# Patient Record
Sex: FEMALE | Race: WHITE | NOT HISPANIC OR LATINO
[De-identification: names, ages, dates, MRNs, and addresses within clinical notes are randomized per-mention and may not be internally consistent; named-entity substitution may affect disease eponyms.]

---

## 2017-01-15 ENCOUNTER — RX RENEWAL (OUTPATIENT)
Age: 82
End: 2017-01-15

## 2017-01-16 ENCOUNTER — RX RENEWAL (OUTPATIENT)
Age: 82
End: 2017-01-16

## 2017-01-16 ENCOUNTER — MEDICATION RENEWAL (OUTPATIENT)
Age: 82
End: 2017-01-16

## 2017-01-17 ENCOUNTER — NON-APPOINTMENT (OUTPATIENT)
Age: 82
End: 2017-01-17

## 2017-01-17 ENCOUNTER — APPOINTMENT (OUTPATIENT)
Dept: CARDIOLOGY | Facility: CLINIC | Age: 82
End: 2017-01-17

## 2017-01-17 VITALS
OXYGEN SATURATION: 95 % | HEIGHT: 60 IN | BODY MASS INDEX: 18.85 KG/M2 | WEIGHT: 96 LBS | DIASTOLIC BLOOD PRESSURE: 85 MMHG | SYSTOLIC BLOOD PRESSURE: 127 MMHG | HEART RATE: 84 BPM

## 2017-02-22 ENCOUNTER — APPOINTMENT (OUTPATIENT)
Dept: INTERNAL MEDICINE | Facility: CLINIC | Age: 82
End: 2017-02-22

## 2017-02-22 VITALS
SYSTOLIC BLOOD PRESSURE: 118 MMHG | TEMPERATURE: 98.1 F | HEIGHT: 60 IN | WEIGHT: 94 LBS | HEART RATE: 83 BPM | OXYGEN SATURATION: 94 % | BODY MASS INDEX: 18.46 KG/M2 | RESPIRATION RATE: 14 BRPM | DIASTOLIC BLOOD PRESSURE: 68 MMHG

## 2017-02-22 VITALS — TEMPERATURE: 97.7 F

## 2017-02-25 ENCOUNTER — EMERGENCY (EMERGENCY)
Facility: HOSPITAL | Age: 82
LOS: 1 days | Discharge: HOME | End: 2017-02-25
Attending: EMERGENCY MEDICINE | Admitting: EMERGENCY MEDICINE
Payer: COMMERCIAL

## 2017-02-25 VITALS
WEIGHT: 149.91 LBS | SYSTOLIC BLOOD PRESSURE: 137 MMHG | HEIGHT: 68 IN | DIASTOLIC BLOOD PRESSURE: 86 MMHG | TEMPERATURE: 98 F | RESPIRATION RATE: 16 BRPM | OXYGEN SATURATION: 97 % | HEART RATE: 63 BPM

## 2017-02-25 VITALS
RESPIRATION RATE: 16 BRPM | DIASTOLIC BLOOD PRESSURE: 71 MMHG | HEART RATE: 89 BPM | SYSTOLIC BLOOD PRESSURE: 125 MMHG | OXYGEN SATURATION: 98 %

## 2017-02-25 DIAGNOSIS — Z90.49 ACQUIRED ABSENCE OF OTHER SPECIFIED PARTS OF DIGESTIVE TRACT: Chronic | ICD-10-CM

## 2017-02-25 DIAGNOSIS — J06.9 ACUTE UPPER RESPIRATORY INFECTION, UNSPECIFIED: ICD-10-CM

## 2017-02-25 DIAGNOSIS — Z96.611 PRESENCE OF RIGHT ARTIFICIAL SHOULDER JOINT: ICD-10-CM

## 2017-02-25 DIAGNOSIS — Z79.01 LONG TERM (CURRENT) USE OF ANTICOAGULANTS: ICD-10-CM

## 2017-02-25 DIAGNOSIS — I34.0 NONRHEUMATIC MITRAL (VALVE) INSUFFICIENCY: ICD-10-CM

## 2017-02-25 DIAGNOSIS — Z98.89 OTHER SPECIFIED POSTPROCEDURAL STATES: Chronic | ICD-10-CM

## 2017-02-25 DIAGNOSIS — R06.02 SHORTNESS OF BREATH: ICD-10-CM

## 2017-02-25 DIAGNOSIS — Z87.891 PERSONAL HISTORY OF NICOTINE DEPENDENCE: ICD-10-CM

## 2017-02-25 DIAGNOSIS — Z96.611 PRESENCE OF RIGHT ARTIFICIAL SHOULDER JOINT: Chronic | ICD-10-CM

## 2017-02-25 DIAGNOSIS — I48.91 UNSPECIFIED ATRIAL FIBRILLATION: ICD-10-CM

## 2017-02-25 DIAGNOSIS — I25.10 ATHEROSCLEROTIC HEART DISEASE OF NATIVE CORONARY ARTERY WITHOUT ANGINA PECTORIS: ICD-10-CM

## 2017-02-25 DIAGNOSIS — J44.9 CHRONIC OBSTRUCTIVE PULMONARY DISEASE, UNSPECIFIED: ICD-10-CM

## 2017-02-25 LAB
ALBUMIN SERPL ELPH-MCNC: 3.8 G/DL — SIGNIFICANT CHANGE UP (ref 3.3–5)
ALP SERPL-CCNC: 66 U/L — SIGNIFICANT CHANGE UP (ref 40–120)
ALT FLD-CCNC: 28 U/L — SIGNIFICANT CHANGE UP (ref 12–78)
AMYLASE P1 CFR SERPL: 63 U/L — SIGNIFICANT CHANGE UP (ref 25–115)
ANION GAP SERPL CALC-SCNC: 8 MMOL/L — SIGNIFICANT CHANGE UP (ref 5–17)
AST SERPL-CCNC: 24 U/L — SIGNIFICANT CHANGE UP (ref 15–37)
BASOPHILS # BLD AUTO: 0.1 K/UL — SIGNIFICANT CHANGE UP (ref 0–0.2)
BASOPHILS NFR BLD AUTO: 1.6 % — SIGNIFICANT CHANGE UP (ref 0–2)
BILIRUB SERPL-MCNC: 0.6 MG/DL — SIGNIFICANT CHANGE UP (ref 0.2–1.2)
BUN SERPL-MCNC: 22 MG/DL — SIGNIFICANT CHANGE UP (ref 7–23)
CALCIUM SERPL-MCNC: 8.8 MG/DL — SIGNIFICANT CHANGE UP (ref 8.5–10.1)
CHLORIDE SERPL-SCNC: 98 MMOL/L — SIGNIFICANT CHANGE UP (ref 96–108)
CK MB CFR SERPL CALC: 0.7 NG/ML — SIGNIFICANT CHANGE UP (ref 0–3.6)
CO2 SERPL-SCNC: 32 MMOL/L — HIGH (ref 22–31)
CREAT SERPL-MCNC: 1.1 MG/DL — SIGNIFICANT CHANGE UP (ref 0.5–1.3)
EOSINOPHIL # BLD AUTO: 0.2 K/UL — SIGNIFICANT CHANGE UP (ref 0–0.5)
EOSINOPHIL NFR BLD AUTO: 2.8 % — SIGNIFICANT CHANGE UP (ref 0–6)
GLUCOSE SERPL-MCNC: 101 MG/DL — HIGH (ref 70–99)
HCT VFR BLD CALC: 37.8 % — SIGNIFICANT CHANGE UP (ref 34.5–45)
HGB BLD-MCNC: 12.7 G/DL — SIGNIFICANT CHANGE UP (ref 11.5–15.5)
LACTATE SERPL-SCNC: 0.9 MMOL/L — SIGNIFICANT CHANGE UP (ref 0.7–2)
LIDOCAIN IGE QN: 146 U/L — SIGNIFICANT CHANGE UP (ref 73–393)
LYMPHOCYTES # BLD AUTO: 1.6 K/UL — SIGNIFICANT CHANGE UP (ref 1–3.3)
LYMPHOCYTES # BLD AUTO: 18.8 % — SIGNIFICANT CHANGE UP (ref 13–44)
MCHC RBC-ENTMCNC: 32 PG — SIGNIFICANT CHANGE UP (ref 27–34)
MCHC RBC-ENTMCNC: 33.7 GM/DL — SIGNIFICANT CHANGE UP (ref 32–36)
MCV RBC AUTO: 95.2 FL — SIGNIFICANT CHANGE UP (ref 80–100)
MONOCYTES # BLD AUTO: 0.7 K/UL — SIGNIFICANT CHANGE UP (ref 0–0.9)
MONOCYTES NFR BLD AUTO: 8.5 % — SIGNIFICANT CHANGE UP (ref 1–9)
NEUTROPHILS # BLD AUTO: 5.8 K/UL — SIGNIFICANT CHANGE UP (ref 1.8–7.4)
NEUTROPHILS NFR BLD AUTO: 68.4 % — SIGNIFICANT CHANGE UP (ref 43–77)
PLATELET # BLD AUTO: 279 K/UL — SIGNIFICANT CHANGE UP (ref 150–400)
POTASSIUM SERPL-MCNC: 4.2 MMOL/L — SIGNIFICANT CHANGE UP (ref 3.5–5.3)
POTASSIUM SERPL-SCNC: 4.2 MMOL/L — SIGNIFICANT CHANGE UP (ref 3.5–5.3)
PROCALCITONIN SERPL-MCNC: <0.05 NG/ML — SIGNIFICANT CHANGE UP (ref 0–0.05)
PROT SERPL-MCNC: 7.2 G/DL — SIGNIFICANT CHANGE UP (ref 6–8.3)
RAPID RVP RESULT: SIGNIFICANT CHANGE UP
RBC # BLD: 3.97 M/UL — SIGNIFICANT CHANGE UP (ref 3.8–5.2)
RBC # FLD: 12.4 % — SIGNIFICANT CHANGE UP (ref 10.3–14.5)
SODIUM SERPL-SCNC: 138 MMOL/L — SIGNIFICANT CHANGE UP (ref 135–145)
TROPONIN I SERPL-MCNC: <.015 NG/ML — SIGNIFICANT CHANGE UP (ref 0.01–0.04)
WBC # BLD: 8.4 K/UL — SIGNIFICANT CHANGE UP (ref 3.8–10.5)
WBC # FLD AUTO: 8.4 K/UL — SIGNIFICANT CHANGE UP (ref 3.8–10.5)

## 2017-02-25 PROCEDURE — 80053 COMPREHEN METABOLIC PANEL: CPT

## 2017-02-25 PROCEDURE — 82150 ASSAY OF AMYLASE: CPT

## 2017-02-25 PROCEDURE — 83605 ASSAY OF LACTIC ACID: CPT

## 2017-02-25 PROCEDURE — 87798 DETECT AGENT NOS DNA AMP: CPT

## 2017-02-25 PROCEDURE — 82553 CREATINE MB FRACTION: CPT

## 2017-02-25 PROCEDURE — 71045 X-RAY EXAM CHEST 1 VIEW: CPT

## 2017-02-25 PROCEDURE — 99285 EMERGENCY DEPT VISIT HI MDM: CPT

## 2017-02-25 PROCEDURE — 87633 RESP VIRUS 12-25 TARGETS: CPT

## 2017-02-25 PROCEDURE — 99284 EMERGENCY DEPT VISIT MOD MDM: CPT

## 2017-02-25 PROCEDURE — 87581 M.PNEUMON DNA AMP PROBE: CPT

## 2017-02-25 PROCEDURE — 99284 EMERGENCY DEPT VISIT MOD MDM: CPT | Mod: 25

## 2017-02-25 PROCEDURE — 83690 ASSAY OF LIPASE: CPT

## 2017-02-25 PROCEDURE — 93005 ELECTROCARDIOGRAM TRACING: CPT

## 2017-02-25 PROCEDURE — 84145 PROCALCITONIN (PCT): CPT

## 2017-02-25 PROCEDURE — 87486 CHLMYD PNEUM DNA AMP PROBE: CPT

## 2017-02-25 PROCEDURE — 84484 ASSAY OF TROPONIN QUANT: CPT

## 2017-02-25 PROCEDURE — 71010: CPT | Mod: 26

## 2017-02-25 PROCEDURE — 85027 COMPLETE CBC AUTOMATED: CPT

## 2017-02-25 RX ORDER — SODIUM CHLORIDE 9 MG/ML
3 INJECTION INTRAMUSCULAR; INTRAVENOUS; SUBCUTANEOUS ONCE
Qty: 0 | Refills: 0 | Status: COMPLETED | OUTPATIENT
Start: 2017-02-25 | End: 2017-02-25

## 2017-02-25 RX ADMIN — SODIUM CHLORIDE 3 MILLILITER(S): 9 INJECTION INTRAMUSCULAR; INTRAVENOUS; SUBCUTANEOUS at 12:05

## 2017-02-25 NOTE — ED PROVIDER NOTE - CARE PLAN
Principal Discharge DX:	Viral upper respiratory tract infection  Instructions for follow-up, activity and diet:	Return to the ED for any new or worsening symptoms  Take your medication as prescribed  Follow up with your PMD in 2-3 days for a recheck   Follow up with Dr. Israel within the week  Use your inhaler only if needed as discussed while in the ED  Finish the Zithromax previously prescribed  Secondary Diagnosis:	Atrial fibrillation, unspecified type  Secondary Diagnosis:	Shortness of breath

## 2017-02-25 NOTE — ED ADULT NURSE NOTE - PMH
Atrial fibrillation, unspecified type    Carbon monoxide poisoning  2010  Compression fracture  Aug 2015  Pulmonary emphysema, unspecified emphysema type    Secondary hypertension

## 2017-02-25 NOTE — ED PROVIDER NOTE - OBJECTIVE STATEMENT
Pt is a 80 yo female who presents to the D with a cc of progressive SOB x 1 week and elevated blood pressures today.  Pt has a history of A-fib and is on Eliquis for this.  Pt reports that she has been experiencing increased SOB this week with a non-productive cough.  She saw her PMD for this and was prescribed Zithromax.  Pt reports little to no improvement in symptoms.  She does have a history of COPD but has not taken her nebulizer and refuses steroids due to a cardiac condition she has.  Pt was told to avoid these medications. Pt reports mild chills and nausea.  Denies fever, V/D/C, CP, abd pain. Today she felt flushed and when she took her BP at home it was higher then normal.  Pt reports systolic pressures of 150.  Denies HA or visual changes.  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

## 2017-02-25 NOTE — ED PROVIDER NOTE - PROGRESS NOTE DETAILS
Pt seen by cardiology Dr. Kenyon cleared from his standpoint for discharge home.  Results of labs and images reviewed with pt, still refusing steroids and albuterol at this time.  Maintaining sats on room air and able to ambulate in the department without distress.  Will discharge home with close outpatient follow up

## 2017-02-25 NOTE — ED PROVIDER NOTE - ENMT, MLM
Airway patent, Nasal mucosa clear. Mouth with normal mucosa. Throat has no vesicles, no oropharyngeal exudates and uvula is midline.  Pt with hoarse voice baseline with history of spastic dysphonia

## 2017-02-25 NOTE — ED ADULT NURSE NOTE - OBJECTIVE STATEMENT
received pt in bed #14B Pt A&Ox3 states she had cough on/off x 1 week & SOB x 4days went to her MD on Wednesday & was placed on Zithromax. Pt states her BP has been high & she still feels slightly SOB CM placed w/ afib EKG done O2 sat 97% on RA Will monitor

## 2017-02-25 NOTE — ED PROVIDER NOTE - PLAN OF CARE
Return to the ED for any new or worsening symptoms  Take your medication as prescribed  Follow up with your PMD in 2-3 days for a recheck   Follow up with Dr. Israel within the week  Use your inhaler only if needed as discussed while in the ED  Finish the Zithromax previously prescribed

## 2017-02-25 NOTE — ED ADULT NURSE NOTE - PSH
History of appendectomy  1962  History of arthroscopy of knee    History of right shoulder replacement  2005

## 2017-03-01 ENCOUNTER — APPOINTMENT (OUTPATIENT)
Dept: INTERNAL MEDICINE | Facility: CLINIC | Age: 82
End: 2017-03-01

## 2017-03-01 VITALS
SYSTOLIC BLOOD PRESSURE: 110 MMHG | HEART RATE: 80 BPM | TEMPERATURE: 97.6 F | HEIGHT: 60 IN | WEIGHT: 94 LBS | OXYGEN SATURATION: 95 % | DIASTOLIC BLOOD PRESSURE: 90 MMHG | BODY MASS INDEX: 18.46 KG/M2 | RESPIRATION RATE: 14 BRPM

## 2017-03-01 DIAGNOSIS — Z87.09 PERSONAL HISTORY OF OTHER DISEASES OF THE RESPIRATORY SYSTEM: ICD-10-CM

## 2017-03-20 ENCOUNTER — RX RENEWAL (OUTPATIENT)
Age: 82
End: 2017-03-20

## 2017-03-20 ENCOUNTER — MEDICATION RENEWAL (OUTPATIENT)
Age: 82
End: 2017-03-20

## 2017-03-23 ENCOUNTER — NON-APPOINTMENT (OUTPATIENT)
Age: 82
End: 2017-03-23

## 2017-03-23 ENCOUNTER — APPOINTMENT (OUTPATIENT)
Dept: CARDIOLOGY | Facility: CLINIC | Age: 82
End: 2017-03-23

## 2017-03-23 VITALS
DIASTOLIC BLOOD PRESSURE: 83 MMHG | HEART RATE: 83 BPM | SYSTOLIC BLOOD PRESSURE: 127 MMHG | BODY MASS INDEX: 19.31 KG/M2 | WEIGHT: 92 LBS | HEIGHT: 58 IN

## 2017-04-03 ENCOUNTER — APPOINTMENT (OUTPATIENT)
Dept: CARDIOLOGY | Facility: CLINIC | Age: 82
End: 2017-04-03

## 2017-04-03 ENCOUNTER — NON-APPOINTMENT (OUTPATIENT)
Age: 82
End: 2017-04-03

## 2017-04-03 VITALS
SYSTOLIC BLOOD PRESSURE: 125 MMHG | HEIGHT: 57 IN | HEART RATE: 81 BPM | DIASTOLIC BLOOD PRESSURE: 77 MMHG | BODY MASS INDEX: 20.06 KG/M2 | WEIGHT: 93 LBS

## 2017-04-14 ENCOUNTER — APPOINTMENT (OUTPATIENT)
Dept: INTERNAL MEDICINE | Facility: CLINIC | Age: 82
End: 2017-04-14

## 2017-04-26 ENCOUNTER — MEDICATION RENEWAL (OUTPATIENT)
Age: 82
End: 2017-04-26

## 2017-05-15 ENCOUNTER — MEDICATION RENEWAL (OUTPATIENT)
Age: 82
End: 2017-05-15

## 2017-06-08 ENCOUNTER — MEDICATION RENEWAL (OUTPATIENT)
Age: 82
End: 2017-06-08

## 2017-06-08 ENCOUNTER — NON-APPOINTMENT (OUTPATIENT)
Age: 82
End: 2017-06-08

## 2017-06-08 ENCOUNTER — APPOINTMENT (OUTPATIENT)
Dept: CARDIOLOGY | Facility: CLINIC | Age: 82
End: 2017-06-08

## 2017-06-08 VITALS
BODY MASS INDEX: 20.93 KG/M2 | HEART RATE: 80 BPM | HEIGHT: 57 IN | SYSTOLIC BLOOD PRESSURE: 126 MMHG | DIASTOLIC BLOOD PRESSURE: 72 MMHG | OXYGEN SATURATION: 98 % | WEIGHT: 97 LBS

## 2017-06-08 RX ORDER — PRAVASTATIN SODIUM 10 MG/1
10 TABLET ORAL
Qty: 30 | Refills: 5 | Status: DISCONTINUED | COMMUNITY
Start: 2017-04-03 | End: 2017-06-08

## 2017-07-13 ENCOUNTER — APPOINTMENT (OUTPATIENT)
Dept: INTERNAL MEDICINE | Facility: CLINIC | Age: 82
End: 2017-07-13

## 2017-07-13 RX ORDER — IPRATROPIUM BROMIDE 17 UG/1
17 AEROSOL, METERED RESPIRATORY (INHALATION) 4 TIMES DAILY
Qty: 1 | Refills: 5 | Status: ACTIVE | COMMUNITY
Start: 2017-07-13 | End: 1900-01-01

## 2017-08-07 ENCOUNTER — RX RENEWAL (OUTPATIENT)
Age: 82
End: 2017-08-07

## 2017-08-10 ENCOUNTER — APPOINTMENT (OUTPATIENT)
Dept: INTERNAL MEDICINE | Facility: CLINIC | Age: 82
End: 2017-08-10
Payer: MEDICARE

## 2017-08-10 VITALS
HEART RATE: 111 BPM | OXYGEN SATURATION: 97 % | DIASTOLIC BLOOD PRESSURE: 92 MMHG | BODY MASS INDEX: 20.28 KG/M2 | HEIGHT: 57 IN | SYSTOLIC BLOOD PRESSURE: 131 MMHG | WEIGHT: 94 LBS

## 2017-08-10 PROCEDURE — 99214 OFFICE O/P EST MOD 30 MIN: CPT

## 2017-08-11 RX ORDER — SIMVASTATIN 10 MG/1
10 TABLET, FILM COATED ORAL
Qty: 30 | Refills: 5 | Status: DISCONTINUED | COMMUNITY
Start: 2017-06-08 | End: 2017-08-11

## 2017-08-17 ENCOUNTER — NON-APPOINTMENT (OUTPATIENT)
Age: 82
End: 2017-08-17

## 2017-08-17 ENCOUNTER — APPOINTMENT (OUTPATIENT)
Dept: CARDIOLOGY | Facility: CLINIC | Age: 82
End: 2017-08-17
Payer: MEDICARE

## 2017-08-17 VITALS
WEIGHT: 95 LBS | BODY MASS INDEX: 20.49 KG/M2 | OXYGEN SATURATION: 100 % | DIASTOLIC BLOOD PRESSURE: 78 MMHG | HEART RATE: 76 BPM | HEIGHT: 57 IN | SYSTOLIC BLOOD PRESSURE: 136 MMHG

## 2017-08-17 PROCEDURE — 93000 ELECTROCARDIOGRAM COMPLETE: CPT

## 2017-08-17 PROCEDURE — 99215 OFFICE O/P EST HI 40 MIN: CPT

## 2017-08-17 RX ORDER — DILTIAZEM HYDROCHLORIDE 60 MG/1
60 TABLET ORAL
Qty: 60 | Refills: 3 | Status: DISCONTINUED | COMMUNITY
Start: 2017-08-11 | End: 2017-08-17

## 2017-10-16 ENCOUNTER — NON-APPOINTMENT (OUTPATIENT)
Age: 82
End: 2017-10-16

## 2017-10-16 ENCOUNTER — MEDICATION RENEWAL (OUTPATIENT)
Age: 82
End: 2017-10-16

## 2017-10-16 ENCOUNTER — APPOINTMENT (OUTPATIENT)
Dept: CARDIOLOGY | Facility: CLINIC | Age: 82
End: 2017-10-16
Payer: MEDICARE

## 2017-10-16 VITALS
BODY MASS INDEX: 20.49 KG/M2 | WEIGHT: 95 LBS | SYSTOLIC BLOOD PRESSURE: 113 MMHG | DIASTOLIC BLOOD PRESSURE: 75 MMHG | HEIGHT: 57 IN | HEART RATE: 59 BPM | OXYGEN SATURATION: 98 %

## 2017-10-16 PROCEDURE — 93000 ELECTROCARDIOGRAM COMPLETE: CPT

## 2017-10-16 PROCEDURE — 99215 OFFICE O/P EST HI 40 MIN: CPT

## 2017-10-16 RX ORDER — ALBUTEROL SULFATE 90 UG/1
108 (90 BASE) AEROSOL, METERED RESPIRATORY (INHALATION)
Qty: 1 | Refills: 3 | Status: ACTIVE | COMMUNITY
Start: 2017-10-16 | End: 1900-01-01

## 2017-10-16 RX ORDER — PREDNISONE 10 MG/1
10 TABLET ORAL
Qty: 19 | Refills: 0 | Status: DISCONTINUED | COMMUNITY
Start: 2017-03-01 | End: 2017-10-16

## 2017-10-20 ENCOUNTER — APPOINTMENT (OUTPATIENT)
Dept: INTERNAL MEDICINE | Facility: CLINIC | Age: 82
End: 2017-10-20
Payer: MEDICARE

## 2017-10-20 VITALS
RESPIRATION RATE: 14 BRPM | DIASTOLIC BLOOD PRESSURE: 78 MMHG | HEART RATE: 61 BPM | HEIGHT: 57 IN | WEIGHT: 96 LBS | SYSTOLIC BLOOD PRESSURE: 120 MMHG | BODY MASS INDEX: 20.71 KG/M2 | OXYGEN SATURATION: 97 %

## 2017-10-20 DIAGNOSIS — Z23 ENCOUNTER FOR IMMUNIZATION: ICD-10-CM

## 2017-10-20 DIAGNOSIS — Z00.00 ENCOUNTER FOR GENERAL ADULT MEDICAL EXAMINATION W/OUT ABNORMAL FINDINGS: ICD-10-CM

## 2017-10-20 PROCEDURE — 90732 PPSV23 VACC 2 YRS+ SUBQ/IM: CPT

## 2017-10-20 PROCEDURE — G0009: CPT

## 2017-10-20 PROCEDURE — G0008: CPT

## 2017-10-20 PROCEDURE — 90686 IIV4 VACC NO PRSV 0.5 ML IM: CPT

## 2017-10-20 PROCEDURE — 99397 PER PM REEVAL EST PAT 65+ YR: CPT | Mod: 25,GY

## 2017-10-24 ENCOUNTER — CLINICAL ADVICE (OUTPATIENT)
Age: 82
End: 2017-10-24

## 2017-10-25 DIAGNOSIS — E78.5 HYPERLIPIDEMIA, UNSPECIFIED: ICD-10-CM

## 2017-10-25 LAB
25(OH)D3 SERPL-MCNC: 25.7 NG/ML
ALBUMIN SERPL ELPH-MCNC: 4.1 G/DL
ALP BLD-CCNC: 63 U/L
ALT SERPL-CCNC: 13 U/L
ANION GAP SERPL CALC-SCNC: 14 MMOL/L
APPEARANCE: CLEAR
AST SERPL-CCNC: 18 U/L
BACTERIA: NEGATIVE
BASOPHILS # BLD AUTO: 0.05 K/UL
BASOPHILS NFR BLD AUTO: 0.6 %
BILIRUB SERPL-MCNC: 0.3 MG/DL
BILIRUBIN URINE: NEGATIVE
BLOOD URINE: NEGATIVE
BUN SERPL-MCNC: 32 MG/DL
CALCIUM SERPL-MCNC: 9.6 MG/DL
CHLORIDE SERPL-SCNC: 102 MMOL/L
CHOLEST SERPL-MCNC: 180 MG/DL
CHOLEST/HDLC SERPL: 3.1 RATIO
CO2 SERPL-SCNC: 26 MMOL/L
COLOR: YELLOW
CREAT SERPL-MCNC: 1.42 MG/DL
EOSINOPHIL # BLD AUTO: 0.18 K/UL
EOSINOPHIL NFR BLD AUTO: 2 %
FOLATE SERPL-MCNC: 15.1 NG/ML
GLUCOSE QUALITATIVE U: NEGATIVE MG/DL
GLUCOSE SERPL-MCNC: 91 MG/DL
HBA1C MFR BLD HPLC: 6 %
HCT VFR BLD CALC: 36.4 %
HDLC SERPL-MCNC: 59 MG/DL
HGB BLD-MCNC: 11.5 G/DL
HYALINE CASTS: 1 /LPF
IMM GRANULOCYTES NFR BLD AUTO: 0.2 %
KETONES URINE: NEGATIVE
LDLC SERPL CALC-MCNC: 77 MG/DL
LEUKOCYTE ESTERASE URINE: NEGATIVE
LYMPHOCYTES # BLD AUTO: 2.33 K/UL
LYMPHOCYTES NFR BLD AUTO: 25.6 %
MAN DIFF?: NORMAL
MCHC RBC-ENTMCNC: 31.6 GM/DL
MCHC RBC-ENTMCNC: 32.3 PG
MCV RBC AUTO: 102.2 FL
MICROSCOPIC-UA: NORMAL
MONOCYTES # BLD AUTO: 0.78 K/UL
MONOCYTES NFR BLD AUTO: 8.6 %
NEUTROPHILS # BLD AUTO: 5.73 K/UL
NEUTROPHILS NFR BLD AUTO: 63 %
NITRITE URINE: NEGATIVE
PH URINE: 6.5
PLATELET # BLD AUTO: 370 K/UL
POTASSIUM SERPL-SCNC: 4.5 MMOL/L
PROT SERPL-MCNC: 7.4 G/DL
PROTEIN URINE: NEGATIVE MG/DL
RBC # BLD: 3.56 M/UL
RBC # FLD: 13.4 %
RED BLOOD CELLS URINE: 2 /HPF
SODIUM SERPL-SCNC: 142 MMOL/L
SPECIFIC GRAVITY URINE: 1.02
SQUAMOUS EPITHELIAL CELLS: 2 /HPF
TRIGL SERPL-MCNC: 218 MG/DL
TSH SERPL-ACNC: 0.99 UIU/ML
UROBILINOGEN URINE: NEGATIVE MG/DL
VIT B12 SERPL-MCNC: 753 PG/ML
WBC # FLD AUTO: 9.09 K/UL
WHITE BLOOD CELLS URINE: 1 /HPF

## 2017-10-25 RX ORDER — AZITHROMYCIN 250 MG/1
250 TABLET, FILM COATED ORAL
Qty: 1 | Refills: 0 | Status: DISCONTINUED | COMMUNITY
Start: 2017-02-22 | End: 2017-10-25

## 2017-11-01 ENCOUNTER — APPOINTMENT (OUTPATIENT)
Dept: DERMATOLOGY | Facility: CLINIC | Age: 82
End: 2017-11-01
Payer: MEDICARE

## 2017-11-01 PROCEDURE — 99202 OFFICE O/P NEW SF 15 MIN: CPT | Mod: 25

## 2017-11-01 PROCEDURE — 17000 DESTRUCT PREMALG LESION: CPT

## 2017-11-27 ENCOUNTER — APPOINTMENT (OUTPATIENT)
Dept: CARDIOLOGY | Facility: CLINIC | Age: 82
End: 2017-11-27
Payer: MEDICARE

## 2017-11-27 ENCOUNTER — APPOINTMENT (OUTPATIENT)
Dept: DERMATOLOGY | Facility: CLINIC | Age: 82
End: 2017-11-27

## 2017-11-27 ENCOUNTER — NON-APPOINTMENT (OUTPATIENT)
Age: 82
End: 2017-11-27

## 2017-11-27 VITALS
WEIGHT: 96 LBS | HEART RATE: 71 BPM | HEIGHT: 57 IN | SYSTOLIC BLOOD PRESSURE: 108 MMHG | OXYGEN SATURATION: 98 % | DIASTOLIC BLOOD PRESSURE: 71 MMHG | BODY MASS INDEX: 20.71 KG/M2

## 2017-11-27 DIAGNOSIS — I25.10 ATHEROSCLEROTIC HEART DISEASE OF NATIVE CORONARY ARTERY W/OUT ANGINA PECTORIS: ICD-10-CM

## 2017-11-27 PROCEDURE — 93000 ELECTROCARDIOGRAM COMPLETE: CPT

## 2017-11-27 PROCEDURE — 99215 OFFICE O/P EST HI 40 MIN: CPT

## 2017-12-05 RX ORDER — ALIROCUMAB 75 MG/ML
75 INJECTION, SOLUTION SUBCUTANEOUS
Qty: 6 | Refills: 3 | Status: ACTIVE | COMMUNITY
Start: 2017-08-17 | End: 1900-01-01

## 2017-12-14 ENCOUNTER — APPOINTMENT (OUTPATIENT)
Dept: INTERNAL MEDICINE | Facility: CLINIC | Age: 82
End: 2017-12-14

## 2017-12-21 ENCOUNTER — APPOINTMENT (OUTPATIENT)
Dept: CARDIOLOGY | Facility: CLINIC | Age: 82
End: 2017-12-21
Payer: MEDICARE

## 2017-12-21 ENCOUNTER — NON-APPOINTMENT (OUTPATIENT)
Age: 82
End: 2017-12-21

## 2017-12-21 VITALS
SYSTOLIC BLOOD PRESSURE: 124 MMHG | WEIGHT: 96 LBS | HEART RATE: 82 BPM | OXYGEN SATURATION: 96 % | DIASTOLIC BLOOD PRESSURE: 79 MMHG | BODY MASS INDEX: 20.71 KG/M2 | HEIGHT: 57 IN

## 2017-12-21 PROCEDURE — 99215 OFFICE O/P EST HI 40 MIN: CPT

## 2017-12-21 PROCEDURE — 93000 ELECTROCARDIOGRAM COMPLETE: CPT

## 2017-12-21 RX ORDER — FLUTICASONE PROPIONATE AND SALMETEROL 50; 250 UG/1; UG/1
250-50 POWDER RESPIRATORY (INHALATION) TWICE DAILY
Qty: 1 | Refills: 2 | Status: ACTIVE | COMMUNITY
Start: 2017-12-21 | End: 1900-01-01

## 2018-01-08 ENCOUNTER — RX RENEWAL (OUTPATIENT)
Age: 83
End: 2018-01-08

## 2018-01-08 ENCOUNTER — MEDICATION RENEWAL (OUTPATIENT)
Age: 83
End: 2018-01-08

## 2018-01-08 RX ORDER — ALBUTEROL SULFATE 90 UG/1
108 (90 BASE) AEROSOL, METERED RESPIRATORY (INHALATION)
Qty: 1 | Refills: 5 | Status: ACTIVE | COMMUNITY
Start: 2017-08-22 | End: 1900-01-01

## 2018-01-09 ENCOUNTER — APPOINTMENT (OUTPATIENT)
Dept: INTERNAL MEDICINE | Facility: CLINIC | Age: 83
End: 2018-01-09
Payer: MEDICARE

## 2018-01-09 VITALS
RESPIRATION RATE: 14 BRPM | HEIGHT: 57 IN | SYSTOLIC BLOOD PRESSURE: 120 MMHG | DIASTOLIC BLOOD PRESSURE: 72 MMHG | BODY MASS INDEX: 21.57 KG/M2 | WEIGHT: 100 LBS | TEMPERATURE: 98.1 F | HEART RATE: 110 BPM | OXYGEN SATURATION: 97 %

## 2018-01-09 PROCEDURE — 99214 OFFICE O/P EST MOD 30 MIN: CPT

## 2018-01-09 RX ORDER — NAFTIFINE HYDROCHLORIDE 20 MG/G
2 CREAM TOPICAL
Qty: 60 | Refills: 0 | Status: COMPLETED | COMMUNITY
Start: 2017-11-01

## 2018-01-17 ENCOUNTER — EMERGENCY (EMERGENCY)
Facility: HOSPITAL | Age: 83
LOS: 1 days | Discharge: HOME | End: 2018-01-17
Attending: EMERGENCY MEDICINE | Admitting: EMERGENCY MEDICINE
Payer: MEDICARE

## 2018-01-17 VITALS
TEMPERATURE: 98 F | SYSTOLIC BLOOD PRESSURE: 155 MMHG | HEART RATE: 74 BPM | RESPIRATION RATE: 18 BRPM | HEIGHT: 60 IN | OXYGEN SATURATION: 93 % | DIASTOLIC BLOOD PRESSURE: 87 MMHG | WEIGHT: 95.02 LBS

## 2018-01-17 VITALS
RESPIRATION RATE: 16 BRPM | OXYGEN SATURATION: 98 % | HEART RATE: 98 BPM | TEMPERATURE: 98 F | SYSTOLIC BLOOD PRESSURE: 146 MMHG | DIASTOLIC BLOOD PRESSURE: 86 MMHG

## 2018-01-17 DIAGNOSIS — Z96.611 PRESENCE OF RIGHT ARTIFICIAL SHOULDER JOINT: Chronic | ICD-10-CM

## 2018-01-17 DIAGNOSIS — Z90.49 ACQUIRED ABSENCE OF OTHER SPECIFIED PARTS OF DIGESTIVE TRACT: Chronic | ICD-10-CM

## 2018-01-17 DIAGNOSIS — Z98.89 OTHER SPECIFIED POSTPROCEDURAL STATES: Chronic | ICD-10-CM

## 2018-01-17 LAB
ALBUMIN SERPL ELPH-MCNC: 3.8 G/DL — SIGNIFICANT CHANGE UP (ref 3.3–5)
ALP SERPL-CCNC: 86 U/L — SIGNIFICANT CHANGE UP (ref 40–120)
ALT FLD-CCNC: 18 U/L — SIGNIFICANT CHANGE UP (ref 12–78)
ANION GAP SERPL CALC-SCNC: 7 MMOL/L — SIGNIFICANT CHANGE UP (ref 5–17)
APTT BLD: 35.5 SEC — SIGNIFICANT CHANGE UP (ref 27.5–37.4)
AST SERPL-CCNC: 25 U/L — SIGNIFICANT CHANGE UP (ref 15–37)
BASOPHILS # BLD AUTO: 0.1 K/UL — SIGNIFICANT CHANGE UP (ref 0–0.2)
BASOPHILS NFR BLD AUTO: 1.6 % — SIGNIFICANT CHANGE UP (ref 0–2)
BILIRUB SERPL-MCNC: 0.4 MG/DL — SIGNIFICANT CHANGE UP (ref 0.2–1.2)
BUN SERPL-MCNC: 39 MG/DL — HIGH (ref 7–23)
CALCIUM SERPL-MCNC: 8.6 MG/DL — SIGNIFICANT CHANGE UP (ref 8.5–10.1)
CHLORIDE SERPL-SCNC: 106 MMOL/L — SIGNIFICANT CHANGE UP (ref 96–108)
CK MB BLD-MCNC: 2.2 % — SIGNIFICANT CHANGE UP (ref 0–3.5)
CK MB CFR SERPL CALC: 1 NG/ML — SIGNIFICANT CHANGE UP (ref 0–3.6)
CK SERPL-CCNC: 46 U/L — SIGNIFICANT CHANGE UP (ref 26–192)
CO2 SERPL-SCNC: 29 MMOL/L — SIGNIFICANT CHANGE UP (ref 22–31)
CREAT SERPL-MCNC: 1.5 MG/DL — HIGH (ref 0.5–1.3)
EOSINOPHIL # BLD AUTO: 0.1 K/UL — SIGNIFICANT CHANGE UP (ref 0–0.5)
EOSINOPHIL NFR BLD AUTO: 1.5 % — SIGNIFICANT CHANGE UP (ref 0–6)
GLUCOSE SERPL-MCNC: 103 MG/DL — HIGH (ref 70–99)
HCT VFR BLD CALC: 36.8 % — SIGNIFICANT CHANGE UP (ref 34.5–45)
HGB BLD-MCNC: 11.9 G/DL — SIGNIFICANT CHANGE UP (ref 11.5–15.5)
INR BLD: 1.19 RATIO — HIGH (ref 0.88–1.16)
LIDOCAIN IGE QN: 135 U/L — SIGNIFICANT CHANGE UP (ref 73–393)
LYMPHOCYTES # BLD AUTO: 2 K/UL — SIGNIFICANT CHANGE UP (ref 1–3.3)
LYMPHOCYTES # BLD AUTO: 21.1 % — SIGNIFICANT CHANGE UP (ref 13–44)
MCHC RBC-ENTMCNC: 32 PG — SIGNIFICANT CHANGE UP (ref 27–34)
MCHC RBC-ENTMCNC: 32.4 GM/DL — SIGNIFICANT CHANGE UP (ref 32–36)
MCV RBC AUTO: 98.8 FL — SIGNIFICANT CHANGE UP (ref 80–100)
MONOCYTES # BLD AUTO: 0.9 K/UL — SIGNIFICANT CHANGE UP (ref 0–0.9)
MONOCYTES NFR BLD AUTO: 9.8 % — HIGH (ref 1–9)
NEUTROPHILS # BLD AUTO: 6.3 K/UL — SIGNIFICANT CHANGE UP (ref 1.8–7.4)
NEUTROPHILS NFR BLD AUTO: 66 % — SIGNIFICANT CHANGE UP (ref 43–77)
PLATELET # BLD AUTO: 341 K/UL — SIGNIFICANT CHANGE UP (ref 150–400)
POTASSIUM SERPL-MCNC: 4.6 MMOL/L — SIGNIFICANT CHANGE UP (ref 3.5–5.3)
POTASSIUM SERPL-SCNC: 4.6 MMOL/L — SIGNIFICANT CHANGE UP (ref 3.5–5.3)
PROT SERPL-MCNC: 7.5 G/DL — SIGNIFICANT CHANGE UP (ref 6–8.3)
PROTHROM AB SERPL-ACNC: 13 SEC — HIGH (ref 9.8–12.7)
RBC # BLD: 3.72 M/UL — LOW (ref 3.8–5.2)
RBC # FLD: 12.4 % — SIGNIFICANT CHANGE UP (ref 10.3–14.5)
SODIUM SERPL-SCNC: 142 MMOL/L — SIGNIFICANT CHANGE UP (ref 135–145)
TROPONIN I SERPL-MCNC: <.015 NG/ML — SIGNIFICANT CHANGE UP (ref 0.01–0.04)
WBC # BLD: 9.5 K/UL — SIGNIFICANT CHANGE UP (ref 3.8–10.5)
WBC # FLD AUTO: 9.5 K/UL — SIGNIFICANT CHANGE UP (ref 3.8–10.5)

## 2018-01-17 PROCEDURE — 74174 CTA ABD&PLVS W/CONTRAST: CPT | Mod: 26

## 2018-01-17 PROCEDURE — 85610 PROTHROMBIN TIME: CPT

## 2018-01-17 PROCEDURE — 99285 EMERGENCY DEPT VISIT HI MDM: CPT

## 2018-01-17 PROCEDURE — 96361 HYDRATE IV INFUSION ADD-ON: CPT

## 2018-01-17 PROCEDURE — 71045 X-RAY EXAM CHEST 1 VIEW: CPT

## 2018-01-17 PROCEDURE — 85730 THROMBOPLASTIN TIME PARTIAL: CPT

## 2018-01-17 PROCEDURE — 85027 COMPLETE CBC AUTOMATED: CPT

## 2018-01-17 PROCEDURE — 71045 X-RAY EXAM CHEST 1 VIEW: CPT | Mod: 26

## 2018-01-17 PROCEDURE — 99284 EMERGENCY DEPT VISIT MOD MDM: CPT | Mod: 25

## 2018-01-17 PROCEDURE — 93005 ELECTROCARDIOGRAM TRACING: CPT

## 2018-01-17 PROCEDURE — 71275 CT ANGIOGRAPHY CHEST: CPT | Mod: 26

## 2018-01-17 PROCEDURE — 84484 ASSAY OF TROPONIN QUANT: CPT

## 2018-01-17 PROCEDURE — 96374 THER/PROPH/DIAG INJ IV PUSH: CPT | Mod: 59

## 2018-01-17 PROCEDURE — 71275 CT ANGIOGRAPHY CHEST: CPT

## 2018-01-17 PROCEDURE — 80053 COMPREHEN METABOLIC PANEL: CPT

## 2018-01-17 PROCEDURE — 83690 ASSAY OF LIPASE: CPT

## 2018-01-17 PROCEDURE — 82553 CREATINE MB FRACTION: CPT

## 2018-01-17 PROCEDURE — 74174 CTA ABD&PLVS W/CONTRAST: CPT

## 2018-01-17 PROCEDURE — 82550 ASSAY OF CK (CPK): CPT

## 2018-01-17 RX ORDER — SODIUM CHLORIDE 9 MG/ML
3 INJECTION INTRAMUSCULAR; INTRAVENOUS; SUBCUTANEOUS ONCE
Qty: 0 | Refills: 0 | Status: COMPLETED | OUTPATIENT
Start: 2018-01-17 | End: 2018-01-17

## 2018-01-17 RX ORDER — MORPHINE SULFATE 50 MG/1
2 CAPSULE, EXTENDED RELEASE ORAL ONCE
Qty: 0 | Refills: 0 | Status: DISCONTINUED | OUTPATIENT
Start: 2018-01-17 | End: 2018-01-17

## 2018-01-17 RX ORDER — SODIUM CHLORIDE 9 MG/ML
1000 INJECTION INTRAMUSCULAR; INTRAVENOUS; SUBCUTANEOUS ONCE
Qty: 0 | Refills: 0 | Status: COMPLETED | OUTPATIENT
Start: 2018-01-17 | End: 2018-01-17

## 2018-01-17 RX ORDER — ACETAMINOPHEN 500 MG
975 TABLET ORAL ONCE
Qty: 0 | Refills: 0 | Status: COMPLETED | OUTPATIENT
Start: 2018-01-17 | End: 2018-01-17

## 2018-01-17 RX ADMIN — MORPHINE SULFATE 2 MILLIGRAM(S): 50 CAPSULE, EXTENDED RELEASE ORAL at 13:51

## 2018-01-17 RX ADMIN — MORPHINE SULFATE 2 MILLIGRAM(S): 50 CAPSULE, EXTENDED RELEASE ORAL at 13:36

## 2018-01-17 RX ADMIN — SODIUM CHLORIDE 1000 MILLILITER(S): 9 INJECTION INTRAMUSCULAR; INTRAVENOUS; SUBCUTANEOUS at 10:20

## 2018-01-17 RX ADMIN — Medication 975 MILLIGRAM(S): at 10:19

## 2018-01-17 RX ADMIN — SODIUM CHLORIDE 3 MILLILITER(S): 9 INJECTION INTRAMUSCULAR; INTRAVENOUS; SUBCUTANEOUS at 10:20

## 2018-01-17 NOTE — CONSULT NOTE ADULT - ASSESSMENT
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    No sign of acute ischemia. Her EKG is AF with a rapid rate, but this is presumably in the setting of anxiety and pain. Her HR is now in the 70's  Given her abdominal pain, I would repeat her CT chest/abdomen with contrast to evaluate the penetrating ulcer. If it is worse, or there is a dissection, she will need a vascular evaluation.  At the same time, these symptoms could be musculoskeletal pain from coughing.  There is no sign of decompensated heart failure.  She has known PAD. Cardiac enzymes negative x 1.  Pain control  She will need close follow up with Dr. Israel, and potential referral for EVAR if recurring pain.

## 2018-01-17 NOTE — ED PROVIDER NOTE - PROGRESS NOTE DETAILS
CT C/A/P unremarkable. pt's pain is resolved when sitting still but returns when she begins to move or cough. aortic ulceration is improving, no other pathology to explain patients pain.  most likely msk in origin.

## 2018-01-17 NOTE — ED ADULT TRIAGE NOTE - CHIEF COMPLAINT QUOTE
" I have abdominal pain radiated to the back started since last night, no nausea, fever, vomiting or diarrhea"

## 2018-01-17 NOTE — CONSULT NOTE ADULT - SUBJECTIVE AND OBJECTIVE BOX
[facility] - Daniella Kenyon, Nola, Ghanshyam, Leon, Sally Israel  Office Number: [phone]    Initial Consult Note    CHIEF COMPLAINT: Patient is a 82y old  Female who presents with a chief complaint of abdominal pain.    HPI: upper belly pain that radiates to the back and is worse w movement, worse w cough, started last night around midnight, pain is constant and moderate severity.  denies sob, fevers, chills, on abx for the cough.    82-year-old woman with a history of atrial fibrillation on anticoagulation, moderate NICM, severe MR, CAD that is medically managed, COPD, myositis, osteoporosis, compression fractures.  She had a nuclear stress test which showed a mild apical ischemia. She also stopped her Spiriva therapy and states that she feels much better.  She started to have pain in her back that radiates to the back. she went to [facility]. 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 was thought to be responsible for her pain. She was discharged home on Percocet.  She had a repeat CT on 16 that showed a 1.3 cm penetrating ulcer. She also had significant PAD. This was grossly unchanged from her previous CAT scan.     She is here in the ER with abdominal pain and cough    She denies any significant lower extremity edema, near-syncope, syncope, strokelike symptoms, She denies any significant  chest pain.   She is compliant with her medications. No reported melena, hematochezia or hematemesis. She has had some more epistaxis and started to take her ASA 3 times a week.       PAST MEDICAL & SURGICAL HISTORY:  Compression fracture: Aug 2015  Carbon monoxide poisonin  Pulmonary emphysema, unspecified emphysema type  Secondary hypertension  Atrial fibrillation, unspecified type  History of arthroscopy of knee  History of appendectomy:   History of right shoulder replacement:       SOCIAL HISTORY:  No tobacco, ethanol, or drug abuse.    FAMILY HISTORY:  No pertinent family history in first degree relatives    No family history of acute MI or sudden cardiac death.    MEDICATIONS  (STANDING):    MEDICATIONS  (PRN):      Allergies    No Known Allergies    Intolerances        REVIEW OF SYSTEMS:    CONSTITUTIONAL: No weakness, fevers or chills  EYES/ENT: No visual changes;  No vertigo or throat pain   NECK: No pain or stiffness  RESPIRATORY: +cough, no wheezing, hemoptysis; No shortness of breath  CARDIOVASCULAR: No chest pain or palpitations  GASTROINTESTINAL: +abdominal pain. No nausea, vomiting, or hematemesis; No diarrhea or constipation. No melena or hematochezia.  GENITOURINARY: No dysuria, frequency or hematuria  NEUROLOGICAL: No numbness or weakness  SKIN: No itching or rash  All other review of systems is negative unless indicated above    VITAL SIGNS:   Vital Signs Last 24 Hrs  T(C): 36.6 (2018 09:38), Max: 36.6 (2018 09:38)  T(F): 97.8 (2018 09:38), Max: 97.8 (2018 09:38)  HR: 74 (2018 09:38) (74 - 74)  BP: 155/87 (2018 09:38) (155/87 - 155/87)  BP(mean): --  RR: 18 (2018 09:38) (18 - 18)  SpO2: 93% (2018 09:38) (93% - 93%)    I&O's Summary      On Exam:    Constitutional: NAD, alert and oriented x 3  Lungs:  Non-labored, breath sounds are clear bilaterally, No wheezing, rales or rhonchi  Cardiovascular: irregular.  S1 and S2 positive.  + SM Penny/LLSB rubs, gallops or clicks  Gastrointestinal: Bowel Sounds present, soft, nontender.   Lymph: No peripheral edema. No cervical lymphadenopathy.  Neurological: Alert, no focal deficits  Skin: No rashes or ulcers   Psych:  Mood & affect appropriate.    LABS: All Labs Reviewed:                        11.9   9.5   )-----------( 341      ( 2018 10:09 )             36.8     2018 10:09    142    |  106    |  39     ----------------------------<  103    4.6     |  29     |  1.50     Ca    8.6        2018 10:09    TPro  7.5    /  Alb  3.8    /  TBili  0.4    /  DBili  x      /  AST  25     /  ALT  18     /  AlkPhos  86     2018 10:09    PT/INR - ( 2018 10:09 )   PT: 13.0 sec;   INR: 1.19 ratio         PTT - ( 2018 10:09 )  PTT:35.5 sec  CARDIAC MARKERS ( 2018 10:09 )  <.015 ng/mL / x     / 46 U/L / x     / 1.0 ng/mL      Blood Culture:         RADIOLOGY:    EKG: AF at 117 bpm

## 2018-01-17 NOTE — ED PROVIDER NOTE - OBJECTIVE STATEMENT
upper belly pain that radiates to the back and is worse w movement, worse w cough, started last night around midnight, pain is constant and moderate severity.  denies sob, fevers, chills, on abx for the cough.

## 2018-01-17 NOTE — ED PROVIDER NOTE - PHYSICAL EXAMINATION
Gen:  alert, awake, no acute distress  HEENT:  atraumatic head, airway clear, pupils equal and round  CV:  rrr, nl S1, S2, no m/r/g  Pulm:  BS equal b/l  Abd: s/nt/nd, +BS  Ext:  moving all extremities, marked kyphosis  Neuro:  grossly intact, no deficits  Skin:  clear, dry, intact

## 2018-01-17 NOTE — ED ADULT NURSE NOTE - OBJECTIVE STATEMENT
Pt. presents to the emergency room complaining of band like upper abdominal pain. The pt. has had a cough for the past 10 days that she has been taking medication for. Pt. denies CP/SOB. Lung sounds are clear and abdominal sounds audible in all four quadrants.

## 2018-01-22 ENCOUNTER — APPOINTMENT (OUTPATIENT)
Dept: INTERNAL MEDICINE | Facility: CLINIC | Age: 83
End: 2018-01-22
Payer: MEDICARE

## 2018-01-22 VITALS
TEMPERATURE: 98.1 F | WEIGHT: 95 LBS | SYSTOLIC BLOOD PRESSURE: 118 MMHG | RESPIRATION RATE: 14 BRPM | BODY MASS INDEX: 20.49 KG/M2 | HEIGHT: 57 IN | DIASTOLIC BLOOD PRESSURE: 72 MMHG | OXYGEN SATURATION: 98 % | HEART RATE: 114 BPM

## 2018-01-22 DIAGNOSIS — J06.9 ACUTE UPPER RESPIRATORY INFECTION, UNSPECIFIED: ICD-10-CM

## 2018-01-22 DIAGNOSIS — R07.81 PLEURODYNIA: ICD-10-CM

## 2018-01-22 PROCEDURE — 99213 OFFICE O/P EST LOW 20 MIN: CPT

## 2018-01-22 RX ORDER — HYDROCODONE BITARTRATE AND HOMATROPINE METHYLBROMIDE 5; 1.5 MG/5ML; MG/5ML
5-1.5 SYRUP ORAL
Qty: 50 | Refills: 0 | Status: DISCONTINUED | COMMUNITY
Start: 2018-01-17

## 2018-01-22 RX ORDER — CEFUROXIME AXETIL 250 MG/1
250 TABLET ORAL
Qty: 20 | Refills: 0 | Status: COMPLETED | COMMUNITY
Start: 2017-03-01 | End: 2018-01-22

## 2018-01-22 RX ORDER — BENZONATATE 100 MG/1
100 CAPSULE ORAL
Qty: 30 | Refills: 0 | Status: COMPLETED | COMMUNITY
Start: 2017-02-22 | End: 2018-01-22

## 2018-01-22 RX ORDER — DOXYCYCLINE HYCLATE 100 MG/1
100 TABLET ORAL TWICE DAILY
Qty: 20 | Refills: 0 | Status: ACTIVE | COMMUNITY
Start: 2018-01-22 | End: 1900-01-01

## 2018-01-30 ENCOUNTER — APPOINTMENT (OUTPATIENT)
Dept: INTERNAL MEDICINE | Facility: CLINIC | Age: 83
End: 2018-01-30

## 2018-01-31 ENCOUNTER — APPOINTMENT (OUTPATIENT)
Dept: INTERNAL MEDICINE | Facility: CLINIC | Age: 83
End: 2018-01-31
Payer: MEDICARE

## 2018-01-31 VITALS
HEIGHT: 57 IN | HEART RATE: 105 BPM | BODY MASS INDEX: 20.49 KG/M2 | DIASTOLIC BLOOD PRESSURE: 80 MMHG | RESPIRATION RATE: 14 BRPM | TEMPERATURE: 97.5 F | WEIGHT: 95 LBS | SYSTOLIC BLOOD PRESSURE: 130 MMHG | OXYGEN SATURATION: 97 %

## 2018-01-31 DIAGNOSIS — J44.1 CHRONIC OBSTRUCTIVE PULMONARY DISEASE WITH (ACUTE) EXACERBATION: ICD-10-CM

## 2018-01-31 PROCEDURE — 99213 OFFICE O/P EST LOW 20 MIN: CPT

## 2018-02-20 ENCOUNTER — EMERGENCY (EMERGENCY)
Facility: HOSPITAL | Age: 83
LOS: 1 days | Discharge: HOME | End: 2018-02-20
Attending: EMERGENCY MEDICINE | Admitting: EMERGENCY MEDICINE
Payer: MEDICARE

## 2018-02-20 VITALS
TEMPERATURE: 97 F | DIASTOLIC BLOOD PRESSURE: 74 MMHG | RESPIRATION RATE: 14 BRPM | SYSTOLIC BLOOD PRESSURE: 133 MMHG | OXYGEN SATURATION: 98 % | HEART RATE: 88 BPM

## 2018-02-20 DIAGNOSIS — Z90.49 ACQUIRED ABSENCE OF OTHER SPECIFIED PARTS OF DIGESTIVE TRACT: Chronic | ICD-10-CM

## 2018-02-20 DIAGNOSIS — Z96.611 PRESENCE OF RIGHT ARTIFICIAL SHOULDER JOINT: Chronic | ICD-10-CM

## 2018-02-20 DIAGNOSIS — Z98.89 OTHER SPECIFIED POSTPROCEDURAL STATES: Chronic | ICD-10-CM

## 2018-02-20 LAB
ALBUMIN SERPL ELPH-MCNC: 3.2 G/DL — LOW (ref 3.3–5)
ALP SERPL-CCNC: 105 U/L — SIGNIFICANT CHANGE UP (ref 40–120)
ALT FLD-CCNC: 24 U/L — SIGNIFICANT CHANGE UP (ref 12–78)
AMYLASE P1 CFR SERPL: 84 U/L — SIGNIFICANT CHANGE UP (ref 25–115)
ANION GAP SERPL CALC-SCNC: 7 MMOL/L — SIGNIFICANT CHANGE UP (ref 5–17)
AST SERPL-CCNC: 34 U/L — SIGNIFICANT CHANGE UP (ref 15–37)
BASOPHILS # BLD AUTO: 0.1 K/UL — SIGNIFICANT CHANGE UP (ref 0–0.2)
BASOPHILS NFR BLD AUTO: 1 % — SIGNIFICANT CHANGE UP (ref 0–2)
BILIRUB SERPL-MCNC: 0.4 MG/DL — SIGNIFICANT CHANGE UP (ref 0.2–1.2)
BUN SERPL-MCNC: 31 MG/DL — HIGH (ref 7–23)
CALCIUM SERPL-MCNC: 8.5 MG/DL — SIGNIFICANT CHANGE UP (ref 8.5–10.1)
CHLORIDE SERPL-SCNC: 110 MMOL/L — HIGH (ref 96–108)
CK MB BLD-MCNC: 1.1 % — SIGNIFICANT CHANGE UP (ref 0–3.5)
CK MB CFR SERPL CALC: 0.9 NG/ML — SIGNIFICANT CHANGE UP (ref 0–3.6)
CK SERPL-CCNC: 85 U/L — SIGNIFICANT CHANGE UP (ref 26–192)
CO2 SERPL-SCNC: 25 MMOL/L — SIGNIFICANT CHANGE UP (ref 22–31)
CREAT SERPL-MCNC: 1.5 MG/DL — HIGH (ref 0.5–1.3)
D DIMER BLD IA.RAPID-MCNC: 280 NG/ML DDU — HIGH
EOSINOPHIL # BLD AUTO: 0.1 K/UL — SIGNIFICANT CHANGE UP (ref 0–0.5)
EOSINOPHIL NFR BLD AUTO: 1 % — SIGNIFICANT CHANGE UP (ref 0–6)
GLUCOSE SERPL-MCNC: 96 MG/DL — SIGNIFICANT CHANGE UP (ref 70–99)
HCT VFR BLD CALC: 34.4 % — LOW (ref 34.5–45)
HGB BLD-MCNC: 11.5 G/DL — SIGNIFICANT CHANGE UP (ref 11.5–15.5)
LIDOCAIN IGE QN: 123 U/L — SIGNIFICANT CHANGE UP (ref 73–393)
LYMPHOCYTES # BLD AUTO: 1.5 K/UL — SIGNIFICANT CHANGE UP (ref 1–3.3)
LYMPHOCYTES # BLD AUTO: 14.5 % — SIGNIFICANT CHANGE UP (ref 13–44)
MCHC RBC-ENTMCNC: 32.7 PG — SIGNIFICANT CHANGE UP (ref 27–34)
MCHC RBC-ENTMCNC: 33.4 GM/DL — SIGNIFICANT CHANGE UP (ref 32–36)
MCV RBC AUTO: 97.8 FL — SIGNIFICANT CHANGE UP (ref 80–100)
MONOCYTES # BLD AUTO: 0.7 K/UL — SIGNIFICANT CHANGE UP (ref 0–0.9)
MONOCYTES NFR BLD AUTO: 7.2 % — SIGNIFICANT CHANGE UP (ref 1–9)
NEUTROPHILS # BLD AUTO: 7.7 K/UL — HIGH (ref 1.8–7.4)
NEUTROPHILS NFR BLD AUTO: 76.4 % — SIGNIFICANT CHANGE UP (ref 43–77)
PLATELET # BLD AUTO: 233 K/UL — SIGNIFICANT CHANGE UP (ref 150–400)
POTASSIUM SERPL-MCNC: 4.8 MMOL/L — SIGNIFICANT CHANGE UP (ref 3.5–5.3)
POTASSIUM SERPL-SCNC: 4.8 MMOL/L — SIGNIFICANT CHANGE UP (ref 3.5–5.3)
PROT SERPL-MCNC: 6.5 G/DL — SIGNIFICANT CHANGE UP (ref 6–8.3)
RBC # BLD: 3.52 M/UL — LOW (ref 3.8–5.2)
RBC # FLD: 14.2 % — SIGNIFICANT CHANGE UP (ref 10.3–14.5)
SODIUM SERPL-SCNC: 142 MMOL/L — SIGNIFICANT CHANGE UP (ref 135–145)
TROPONIN I SERPL-MCNC: <.015 NG/ML — SIGNIFICANT CHANGE UP (ref 0.01–0.04)
WBC # BLD: 10.1 K/UL — SIGNIFICANT CHANGE UP (ref 3.8–10.5)
WBC # FLD AUTO: 10.1 K/UL — SIGNIFICANT CHANGE UP (ref 3.8–10.5)

## 2018-02-20 PROCEDURE — 71275 CT ANGIOGRAPHY CHEST: CPT | Mod: 26

## 2018-02-20 PROCEDURE — 72100 X-RAY EXAM L-S SPINE 2/3 VWS: CPT | Mod: 26

## 2018-02-20 PROCEDURE — 99285 EMERGENCY DEPT VISIT HI MDM: CPT

## 2018-02-20 PROCEDURE — 71045 X-RAY EXAM CHEST 1 VIEW: CPT | Mod: 26

## 2018-02-20 PROCEDURE — 74174 CTA ABD&PLVS W/CONTRAST: CPT | Mod: 26

## 2018-02-20 PROCEDURE — 72070 X-RAY EXAM THORAC SPINE 2VWS: CPT | Mod: 26

## 2018-02-20 RX ORDER — MORPHINE SULFATE 50 MG/1
2 CAPSULE, EXTENDED RELEASE ORAL ONCE
Qty: 0 | Refills: 0 | Status: DISCONTINUED | OUTPATIENT
Start: 2018-02-20 | End: 2018-02-20

## 2018-02-20 RX ORDER — ONDANSETRON 8 MG/1
4 TABLET, FILM COATED ORAL ONCE
Qty: 0 | Refills: 0 | Status: COMPLETED | OUTPATIENT
Start: 2018-02-20 | End: 2018-02-20

## 2018-02-20 RX ADMIN — MORPHINE SULFATE 2 MILLIGRAM(S): 50 CAPSULE, EXTENDED RELEASE ORAL at 17:51

## 2018-02-20 RX ADMIN — MORPHINE SULFATE 2 MILLIGRAM(S): 50 CAPSULE, EXTENDED RELEASE ORAL at 20:58

## 2018-02-20 RX ADMIN — MORPHINE SULFATE 2 MILLIGRAM(S): 50 CAPSULE, EXTENDED RELEASE ORAL at 19:11

## 2018-02-20 RX ADMIN — ONDANSETRON 4 MILLIGRAM(S): 8 TABLET, FILM COATED ORAL at 17:50

## 2018-02-20 NOTE — ED PROVIDER NOTE - PEDAL EDEMA LATERALITY
How Many Skin Cancers Have You Had?: one What Is The Reason For Today's Visit?: History of Non-Melanoma Skin Cancer none

## 2018-02-20 NOTE — CONSULT NOTE ADULT - SUBJECTIVE AND OBJECTIVE BOX
[facility] - Daniella Kenyon, Ghanshyam Vaca, Leon, Sally Israel  Office Number: [phone]    Initial Consult Note    CHIEF COMPLAINT: Patient is a 82y old  Female who presents with a chief complaint of chest and back pain    HPI: 82 y female accompanied to ed by sister, sent to ed by cardio Dr Jr Israel, states she spoke to him over the phone, presents with pain under bilateral ribs radiating to her back x few days, movement makes the pain worse.   denies sob, denies chest pain, denies abdominal pain, no nausea, no vomiting.  denies trauma.  states she has hx of aortic ulcer found on chest ct done in January.  she took 2 tylenol and advil earlier today, did not help with the pain. patient is on Eliquis for hx of afib.  former smoker, quit 5 years ago.      Of note, Mrs Shi is a 82-year-old woman with a history of atrial fibrillation on anticoagulation, moderate NICM, severe MR, CAD that is medically managed, COPD, myositis, osteoporosis, compression fractures.  She had a nuclear stress test which showed a mild apical ischemia. She also stopped her Spiriva therapy and states that she feels much better.  She started to have pain in her back that radiates to the back. she went to [facility]. 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 was thought to be responsible for her pain. She was discharged home on Percocet.  She had a repeat CT on 16 that showed a 1.3 cm penetrating ulcer. She also had significant PAD. This was grossly unchanged from her previous CAT scan.    She re-presented to the  ER last month, and had an unchanged CT scan. Today, her symptoms are better than those that brought her to [facility], but much worse than last month. She cannot move or take deep breaths without pain.        PAST MEDICAL & SURGICAL HISTORY:  Compression fracture: Aug 2015  Carbon monoxide poisonin  Pulmonary emphysema, unspecified emphysema type  Secondary hypertension  Atrial fibrillation, unspecified type  History of arthroscopy of knee  History of appendectomy:   History of right shoulder replacement:       SOCIAL HISTORY:  Former smoker.    FAMILY HISTORY:  No pertinent family history in first degree relatives    No family history of acute MI or sudden cardiac death.    MEDICATIONS  (STANDING):    MEDICATIONS  (PRN):      Allergies    No Known Allergies    Intolerances        REVIEW OF SYSTEMS:    CONSTITUTIONAL: + weakness, no fevers or chills  EYES/ENT: No visual changes;  No vertigo or throat pain   NECK: No pain or stiffness  RESPIRATORY: No cough, wheezing, hemoptysis; No shortness of breath  CARDIOVASCULAR: + chest pain, no palpitations  GASTROINTESTINAL: No abdominal pain. No nausea, vomiting, or hematemesis; No diarrhea or constipation. No melena or hematochezia.  GENITOURINARY: No dysuria, frequency or hematuria  NEUROLOGICAL: No numbness or weakness  SKIN: No itching or rash  All other review of systems is negative unless indicated above    VITAL SIGNS:   Vital Signs Last 24 Hrs  T(C): 36.2 (2018 16:13), Max: 36.2 (2018 16:13)  T(F): 97.2 (2018 16:13), Max: 97.2 (2018 16:13)  HR: 78 (2018 17:52) (75 - 88)  BP: 111/56 (2018 17:52) (111/56 - 145/83)  BP(mean): --  RR: 15 (2018 17:50) (14 - 15)  SpO2: 96% (2018 17:50) (96% - 98%)    I&O's Summary      On Exam:    Constitutional: NAD, alert and oriented x 3  Lungs:  Non-labored, breath sounds are clear bilaterally, No wheezing, rales or rhonchi  Cardiovascular: irregular.  S1 and S2 positive.  + SM Penny/LLSB rubs, gallops or clicks  Gastrointestinal: Bowel Sounds present, soft, nontender.   Lymph: No peripheral edema. No cervical lymphadenopathy.  Neurological: Alert, no focal deficits  Skin: No rashes or ulcers   Psych:  Mood & affect appropriate.      LABS: All Labs Reviewed:                        .5   10.1  )-----------( 233      ( 2018 17:53 )             34.4     2018 17:53    142    |  110    |  31     ----------------------------<  96     4.8     |  25     |  1.50     Ca    8.5        2018 17:53    TPro  6.5    /  Alb  3.2    /  TBili  0.4    /  DBili  x      /  AST  34     /  ALT  24     /  AlkPhos  105    2018 17:53      CARDIAC MARKERS ( 2018 17:53 )  <.015 ng/mL / x     / 85 U/L / x     / 0.9 ng/mL      Blood Culture:         RADIOLOGY:    EKG: Not yet performed at time of exam

## 2018-02-20 NOTE — ED PROVIDER NOTE - ATTENDING CONTRIBUTION TO CARE
Pt seen and examined. pt is an elderly fermale extensive pmhx. pt seen by pa. agree with a and p. pt has hx fo ulcerated plaques in thoracic aorta and sent in by dr raya for co eptigastric pain, constant to back . pain increased with movement. no sob, f/c, numbness or weakness. on exam no bony ttp. pulses 2+. pt seen by cards dr ventura and reerma dd. if elevated repeat cta to assess aorta. is elevated. pain meds, cta neg except new tspine compression fx. pain meds, fu spine

## 2018-02-20 NOTE — ED ADULT NURSE NOTE - ED STAT RN HANDOFF DETAILS
Pt stable and resting in bed. Pt denies any pain or discomfort as of this time. Pt admitted and handoff report given to ARIES Baker for continuum of care. No sign of distress noted.

## 2018-02-20 NOTE — CONSULT NOTE ADULT - ASSESSMENT
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    CT chest in January, with unchanged status of the ulcer.  Today, her pain is worse. D-dimer is slightly elevated.   No sign of acute ischemia. Cardiac enzymes are negative.   Xrays of the spine, show a possible thoracic compression fracture, which is likely the cause of everything.  That being said, I had a long discussion with the patient and family regarding getting another CTA. Her creatinine is 1.5, which is exactly unchanged from prior visit. Given the mild d-dimer, and the fact that her BP is >30 mm Hg different in both arms, it seems like we need to proceed. She will stay hydrated as recommended.  Pain control for the compression fx  There is no sign of decompensated heart failure.  She has known PAD.   She will need close follow up with Dr. Israel, and potential referral for EVAR if recurring pain.

## 2018-02-20 NOTE — ED PROVIDER NOTE - MEDICAL DECISION MAKING DETAILS
bilateral rib pain radiating to back, labs, d dimer, ekg, cardio consult bilateral rib pain radiating to back, labs, d dimer, ekg, cardio consult--ct with thoracic compression fx, no change in aortic ulcer, d/c pain med, fu ortho

## 2018-02-20 NOTE — ED PROVIDER NOTE - OBJECTIVE STATEMENT
82 y female accompanied to ed by sister, sent to ed by cardio Dr Jr Israel, presents with pain under bilateral ribs radiating to her back 82 y female accompanied to ed by sister, sent to ed by cardio Dr Jr Israel, states she spoke to him over the phone, presents with pain under bilateral ribs radiating to her back x few days, movement makes the pain worse.   denies sob, denies chest pain, denies abdominal pain, no nausea, no vomiting.  denies trauma.  states she has hx of aortic ulcer found on chest ct done in January.  she took 2 tylenol and advil earlier today, did not help with the pain. patient is on Eliquis for hx of afib.  former smoker, quit 5 years ago.   PMD Dr oleary

## 2018-02-20 NOTE — ED ADULT NURSE NOTE - OBJECTIVE STATEMENT
Pt received in bed alert oriented and resting in bed with the c/o severe back pain. As per md's orders IV mihir placed blood specimen obtained and sent to the lab. Nursing care ongoing and safety maintained.

## 2018-02-20 NOTE — ED PROVIDER NOTE - CHPI ED SYMPTOMS NEG
no constipation/no difficulty bearing weight/no motor function loss/no tingling/no numbness/no bladder dysfunction/no neck tenderness/no bowel dysfunction

## 2018-02-21 VITALS
HEART RATE: 82 BPM | OXYGEN SATURATION: 96 % | DIASTOLIC BLOOD PRESSURE: 72 MMHG | SYSTOLIC BLOOD PRESSURE: 109 MMHG | TEMPERATURE: 98 F | RESPIRATION RATE: 16 BRPM

## 2018-02-21 PROCEDURE — 82550 ASSAY OF CK (CPK): CPT

## 2018-02-21 PROCEDURE — 99284 EMERGENCY DEPT VISIT MOD MDM: CPT | Mod: 25

## 2018-02-21 PROCEDURE — 74174 CTA ABD&PLVS W/CONTRAST: CPT

## 2018-02-21 PROCEDURE — 71275 CT ANGIOGRAPHY CHEST: CPT

## 2018-02-21 PROCEDURE — 96374 THER/PROPH/DIAG INJ IV PUSH: CPT | Mod: 59

## 2018-02-21 PROCEDURE — 82553 CREATINE MB FRACTION: CPT

## 2018-02-21 PROCEDURE — 72070 X-RAY EXAM THORAC SPINE 2VWS: CPT

## 2018-02-21 PROCEDURE — 84484 ASSAY OF TROPONIN QUANT: CPT

## 2018-02-21 PROCEDURE — 71045 X-RAY EXAM CHEST 1 VIEW: CPT

## 2018-02-21 PROCEDURE — 72100 X-RAY EXAM L-S SPINE 2/3 VWS: CPT

## 2018-02-21 PROCEDURE — 96375 TX/PRO/DX INJ NEW DRUG ADDON: CPT

## 2018-02-21 PROCEDURE — 82150 ASSAY OF AMYLASE: CPT

## 2018-02-21 PROCEDURE — 96376 TX/PRO/DX INJ SAME DRUG ADON: CPT

## 2018-02-21 PROCEDURE — 83690 ASSAY OF LIPASE: CPT

## 2018-02-21 PROCEDURE — 85027 COMPLETE CBC AUTOMATED: CPT

## 2018-02-21 PROCEDURE — 85379 FIBRIN DEGRADATION QUANT: CPT

## 2018-02-21 PROCEDURE — 80053 COMPREHEN METABOLIC PANEL: CPT

## 2018-02-21 RX ORDER — OXYCODONE HYDROCHLORIDE 5 MG/1
1 TABLET ORAL
Qty: 18 | Refills: 0 | OUTPATIENT
Start: 2018-02-21 | End: 2018-02-23

## 2018-02-21 RX ORDER — OXYCODONE AND ACETAMINOPHEN 5; 325 MG/1; MG/1
1 TABLET ORAL ONCE
Qty: 0 | Refills: 0 | Status: DISCONTINUED | OUTPATIENT
Start: 2018-02-21 | End: 2018-02-21

## 2018-02-21 RX ADMIN — OXYCODONE AND ACETAMINOPHEN 1 TABLET(S): 5; 325 TABLET ORAL at 00:25

## 2018-03-06 ENCOUNTER — APPOINTMENT (OUTPATIENT)
Dept: INTERNAL MEDICINE | Facility: CLINIC | Age: 83
End: 2018-03-06

## 2018-03-19 ENCOUNTER — MEDICATION RENEWAL (OUTPATIENT)
Age: 83
End: 2018-03-19

## 2018-03-20 ENCOUNTER — APPOINTMENT (OUTPATIENT)
Dept: CARDIOLOGY | Facility: CLINIC | Age: 83
End: 2018-03-20

## 2018-04-05 ENCOUNTER — APPOINTMENT (OUTPATIENT)
Dept: DERMATOLOGY | Facility: CLINIC | Age: 83
End: 2018-04-05

## 2018-04-09 ENCOUNTER — EMERGENCY (EMERGENCY)
Facility: HOSPITAL | Age: 83
LOS: 1 days | Discharge: HOME | End: 2018-04-09
Attending: EMERGENCY MEDICINE | Admitting: EMERGENCY MEDICINE
Payer: MEDICARE

## 2018-04-09 ENCOUNTER — APPOINTMENT (OUTPATIENT)
Dept: INTERNAL MEDICINE | Facility: CLINIC | Age: 83
End: 2018-04-09
Payer: MEDICARE

## 2018-04-09 ENCOUNTER — OTHER (OUTPATIENT)
Age: 83
End: 2018-04-09

## 2018-04-09 VITALS
SYSTOLIC BLOOD PRESSURE: 100 MMHG | BODY MASS INDEX: 20.49 KG/M2 | TEMPERATURE: 97.8 F | HEART RATE: 125 BPM | RESPIRATION RATE: 14 BRPM | DIASTOLIC BLOOD PRESSURE: 70 MMHG | WEIGHT: 95 LBS | OXYGEN SATURATION: 95 % | HEIGHT: 57 IN

## 2018-04-09 VITALS — DIASTOLIC BLOOD PRESSURE: 50 MMHG | SYSTOLIC BLOOD PRESSURE: 78 MMHG

## 2018-04-09 VITALS
RESPIRATION RATE: 18 BRPM | HEART RATE: 105 BPM | DIASTOLIC BLOOD PRESSURE: 46 MMHG | TEMPERATURE: 98 F | SYSTOLIC BLOOD PRESSURE: 95 MMHG

## 2018-04-09 VITALS
SYSTOLIC BLOOD PRESSURE: 100 MMHG | RESPIRATION RATE: 16 BRPM | HEART RATE: 119 BPM | HEIGHT: 60 IN | WEIGHT: 95.02 LBS | DIASTOLIC BLOOD PRESSURE: 65 MMHG | TEMPERATURE: 98 F | OXYGEN SATURATION: 97 %

## 2018-04-09 DIAGNOSIS — Z96.611 PRESENCE OF RIGHT ARTIFICIAL SHOULDER JOINT: Chronic | ICD-10-CM

## 2018-04-09 DIAGNOSIS — Z98.89 OTHER SPECIFIED POSTPROCEDURAL STATES: Chronic | ICD-10-CM

## 2018-04-09 DIAGNOSIS — Z90.49 ACQUIRED ABSENCE OF OTHER SPECIFIED PARTS OF DIGESTIVE TRACT: Chronic | ICD-10-CM

## 2018-04-09 DIAGNOSIS — M54.5 LOW BACK PAIN: ICD-10-CM

## 2018-04-09 DIAGNOSIS — I95.2 HYPOTENSION DUE TO DRUGS: ICD-10-CM

## 2018-04-09 LAB
ALBUMIN SERPL ELPH-MCNC: 3.2 G/DL — LOW (ref 3.3–5)
ALP SERPL-CCNC: 124 U/L — HIGH (ref 40–120)
ALT FLD-CCNC: 16 U/L — SIGNIFICANT CHANGE UP (ref 12–78)
ANION GAP SERPL CALC-SCNC: 11 MMOL/L — SIGNIFICANT CHANGE UP (ref 5–17)
APPEARANCE UR: CLEAR — SIGNIFICANT CHANGE UP
AST SERPL-CCNC: 23 U/L — SIGNIFICANT CHANGE UP (ref 15–37)
BASOPHILS # BLD AUTO: 0.1 K/UL — SIGNIFICANT CHANGE UP (ref 0–0.2)
BASOPHILS NFR BLD AUTO: 1 % — SIGNIFICANT CHANGE UP (ref 0–2)
BILIRUB SERPL-MCNC: 1.3 MG/DL — HIGH (ref 0.2–1.2)
BILIRUB UR-MCNC: NEGATIVE — SIGNIFICANT CHANGE UP
BUN SERPL-MCNC: 29 MG/DL — HIGH (ref 7–23)
CALCIUM SERPL-MCNC: 8.4 MG/DL — LOW (ref 8.5–10.1)
CHLORIDE SERPL-SCNC: 93 MMOL/L — LOW (ref 96–108)
CO2 SERPL-SCNC: 26 MMOL/L — SIGNIFICANT CHANGE UP (ref 22–31)
COLOR SPEC: YELLOW — SIGNIFICANT CHANGE UP
CREAT SERPL-MCNC: 1.5 MG/DL — HIGH (ref 0.5–1.3)
DIFF PNL FLD: NEGATIVE — SIGNIFICANT CHANGE UP
EOSINOPHIL # BLD AUTO: 0.1 K/UL — SIGNIFICANT CHANGE UP (ref 0–0.5)
EOSINOPHIL NFR BLD AUTO: 0.8 % — SIGNIFICANT CHANGE UP (ref 0–6)
GLUCOSE SERPL-MCNC: 108 MG/DL — HIGH (ref 70–99)
GLUCOSE UR QL: NEGATIVE — SIGNIFICANT CHANGE UP
HCT VFR BLD CALC: 38.8 % — SIGNIFICANT CHANGE UP (ref 34.5–45)
HGB BLD-MCNC: 13.2 G/DL — SIGNIFICANT CHANGE UP (ref 11.5–15.5)
KETONES UR-MCNC: NEGATIVE — SIGNIFICANT CHANGE UP
LEUKOCYTE ESTERASE UR-ACNC: NEGATIVE — SIGNIFICANT CHANGE UP
LIDOCAIN IGE QN: 76 U/L — SIGNIFICANT CHANGE UP (ref 73–393)
LYMPHOCYTES # BLD AUTO: 1.8 K/UL — SIGNIFICANT CHANGE UP (ref 1–3.3)
LYMPHOCYTES # BLD AUTO: 16.2 % — SIGNIFICANT CHANGE UP (ref 13–44)
MCHC RBC-ENTMCNC: 33.9 PG — SIGNIFICANT CHANGE UP (ref 27–34)
MCHC RBC-ENTMCNC: 34 GM/DL — SIGNIFICANT CHANGE UP (ref 32–36)
MCV RBC AUTO: 99.8 FL — SIGNIFICANT CHANGE UP (ref 80–100)
MONOCYTES # BLD AUTO: 0.8 K/UL — SIGNIFICANT CHANGE UP (ref 0–0.9)
MONOCYTES NFR BLD AUTO: 7.7 % — SIGNIFICANT CHANGE UP (ref 1–9)
NEUTROPHILS # BLD AUTO: 8.1 K/UL — HIGH (ref 1.8–7.4)
NEUTROPHILS NFR BLD AUTO: 74.3 % — SIGNIFICANT CHANGE UP (ref 43–77)
NITRITE UR-MCNC: NEGATIVE — SIGNIFICANT CHANGE UP
PH UR: 5 — SIGNIFICANT CHANGE UP (ref 5–8)
PLATELET # BLD AUTO: 333 K/UL — SIGNIFICANT CHANGE UP (ref 150–400)
POTASSIUM SERPL-MCNC: 4.4 MMOL/L — SIGNIFICANT CHANGE UP (ref 3.5–5.3)
POTASSIUM SERPL-SCNC: 4.4 MMOL/L — SIGNIFICANT CHANGE UP (ref 3.5–5.3)
PROT SERPL-MCNC: 6.8 G/DL — SIGNIFICANT CHANGE UP (ref 6–8.3)
PROT UR-MCNC: NEGATIVE — SIGNIFICANT CHANGE UP
RBC # BLD: 3.89 M/UL — SIGNIFICANT CHANGE UP (ref 3.8–5.2)
RBC # FLD: 15 % — HIGH (ref 10.3–14.5)
SODIUM SERPL-SCNC: 130 MMOL/L — LOW (ref 135–145)
SP GR SPEC: 1.01 — SIGNIFICANT CHANGE UP (ref 1.01–1.02)
UROBILINOGEN FLD QL: NEGATIVE — SIGNIFICANT CHANGE UP
WBC # BLD: 10.9 K/UL — HIGH (ref 3.8–10.5)
WBC # FLD AUTO: 10.9 K/UL — HIGH (ref 3.8–10.5)

## 2018-04-09 PROCEDURE — 99214 OFFICE O/P EST MOD 30 MIN: CPT

## 2018-04-09 PROCEDURE — 99284 EMERGENCY DEPT VISIT MOD MDM: CPT

## 2018-04-09 PROCEDURE — 99284 EMERGENCY DEPT VISIT MOD MDM: CPT | Mod: 25

## 2018-04-09 PROCEDURE — 74176 CT ABD & PELVIS W/O CONTRAST: CPT

## 2018-04-09 PROCEDURE — 81003 URINALYSIS AUTO W/O SCOPE: CPT

## 2018-04-09 PROCEDURE — 80053 COMPREHEN METABOLIC PANEL: CPT

## 2018-04-09 PROCEDURE — 36415 COLL VENOUS BLD VENIPUNCTURE: CPT

## 2018-04-09 PROCEDURE — 87086 URINE CULTURE/COLONY COUNT: CPT

## 2018-04-09 PROCEDURE — 74176 CT ABD & PELVIS W/O CONTRAST: CPT | Mod: 26

## 2018-04-09 PROCEDURE — 96360 HYDRATION IV INFUSION INIT: CPT

## 2018-04-09 PROCEDURE — 51702 INSERT TEMP BLADDER CATH: CPT

## 2018-04-09 PROCEDURE — 85027 COMPLETE CBC AUTOMATED: CPT

## 2018-04-09 PROCEDURE — 83690 ASSAY OF LIPASE: CPT

## 2018-04-09 RX ORDER — SODIUM CHLORIDE 9 MG/ML
1000 INJECTION INTRAMUSCULAR; INTRAVENOUS; SUBCUTANEOUS ONCE
Qty: 0 | Refills: 0 | Status: DISCONTINUED | OUTPATIENT
Start: 2018-04-09 | End: 2018-04-09

## 2018-04-09 RX ORDER — SODIUM CHLORIDE 9 MG/ML
3 INJECTION INTRAMUSCULAR; INTRAVENOUS; SUBCUTANEOUS ONCE
Qty: 0 | Refills: 0 | Status: COMPLETED | OUTPATIENT
Start: 2018-04-09 | End: 2018-04-09

## 2018-04-09 RX ORDER — SODIUM CHLORIDE 9 MG/ML
1000 INJECTION INTRAMUSCULAR; INTRAVENOUS; SUBCUTANEOUS ONCE
Qty: 0 | Refills: 0 | Status: COMPLETED | OUTPATIENT
Start: 2018-04-09 | End: 2018-04-09

## 2018-04-09 RX ADMIN — SODIUM CHLORIDE 1000 MILLILITER(S): 9 INJECTION INTRAMUSCULAR; INTRAVENOUS; SUBCUTANEOUS at 17:12

## 2018-04-09 RX ADMIN — SODIUM CHLORIDE 3 MILLILITER(S): 9 INJECTION INTRAMUSCULAR; INTRAVENOUS; SUBCUTANEOUS at 16:14

## 2018-04-09 NOTE — ED PROVIDER NOTE - PLAN OF CARE
Return to the ED for any new or worsening symptoms  Take your medication as prescribed  Increase your water intake   Follow up with your PMD In 1-2 days for a recheck   Advance activity as tolerated

## 2018-04-09 NOTE — ED ADULT NURSE NOTE - OBJECTIVE STATEMENT
Presents to ER w urinary retention since this AM. Pt also reports right sided back pain, but states she has compression fractures of LI & T7. Presents to ER w urinary retention since this AM. Pt also reports right sided back pain, but states she has compression fractures of LI & T7.  Upon straight cathing pt, only 20 ml urine was relieved from bladder.  Dr Ryder aware, will place IV and obtain bloods. Pt now requires CT [place].

## 2018-04-09 NOTE — ED PROVIDER NOTE - PROGRESS NOTE DETAILS
Pt with 200 ml of urine in her bladder at this time now voiding.  Will discharge home with outpatient follow up

## 2018-04-09 NOTE — ED PROVIDER NOTE - CARE PLAN
Principal Discharge DX:	Dehydration  Assessment and plan of treatment:	Return to the ED for any new or worsening symptoms  Take your medication as prescribed  Increase your water intake   Follow up with your PMD In 1-2 days for a recheck   Advance activity as tolerated  Secondary Diagnosis:	Chronic renal insufficiency

## 2018-04-09 NOTE — ED PROVIDER NOTE - OBJECTIVE STATEMENT
Pt is a 84 yo female who presents to the ED with a cc of urinary retention.  PMHx of A-fib on Eliquis, carbon monoxide poisoning, h/o compression fractures, emphysema, HTN.  Pt reports that she suffers from chronic back pain secondary to compression fractures.  At the end of March pt began to c/o right sided flank pain different from her typical back pain.  They initially thought it was a worsening of her compression fractures and followed up for this.  She has an outpatient MRI scheduled in the future.  For the last 4-5 days she has been experiencing difficulty urinating with associated urgency.  Today pt has been unable to urinate at all.  Denies fever, CP, SOB, abd pain.  Does report chills, nausea, and vomiting.  She followed up with her PMD and was sent to the ED for further work up

## 2018-04-10 ENCOUNTER — CHART COPY (OUTPATIENT)
Age: 83
End: 2018-04-10

## 2018-04-10 LAB
CULTURE RESULTS: NO GROWTH — SIGNIFICANT CHANGE UP
SPECIMEN SOURCE: SIGNIFICANT CHANGE UP

## 2018-04-11 LAB
ANION GAP SERPL CALC-SCNC: 17 MMOL/L
BASOPHILS # BLD AUTO: 0.03 K/UL
BASOPHILS NFR BLD AUTO: 0.3 %
BUN SERPL-MCNC: 28 MG/DL
CALCIUM SERPL-MCNC: 9.1 MG/DL
CHLORIDE SERPL-SCNC: 91 MMOL/L
CO2 SERPL-SCNC: 25 MMOL/L
CREAT SERPL-MCNC: 1.33 MG/DL
EOSINOPHIL # BLD AUTO: 0.09 K/UL
EOSINOPHIL NFR BLD AUTO: 0.9 %
GLUCOSE SERPL-MCNC: 99 MG/DL
HCT VFR BLD CALC: 39.5 %
HGB BLD-MCNC: 12.8 G/DL
IMM GRANULOCYTES NFR BLD AUTO: 0.7 %
LYMPHOCYTES # BLD AUTO: 1.8 K/UL
LYMPHOCYTES NFR BLD AUTO: 17.2 %
MAN DIFF?: NORMAL
MCHC RBC-ENTMCNC: 32.4 GM/DL
MCHC RBC-ENTMCNC: 33.9 PG
MCV RBC AUTO: 104.5 FL
MONOCYTES # BLD AUTO: 0.75 K/UL
MONOCYTES NFR BLD AUTO: 7.2 %
NEUTROPHILS # BLD AUTO: 7.72 K/UL
NEUTROPHILS NFR BLD AUTO: 73.7 %
PLATELET # BLD AUTO: 369 K/UL
POTASSIUM SERPL-SCNC: 4 MMOL/L
RBC # BLD: 3.78 M/UL
RBC # FLD: 16.3 %
SODIUM SERPL-SCNC: 133 MMOL/L
WBC # FLD AUTO: 10.46 K/UL

## 2018-04-13 ENCOUNTER — NON-APPOINTMENT (OUTPATIENT)
Age: 83
End: 2018-04-13

## 2018-04-13 ENCOUNTER — APPOINTMENT (OUTPATIENT)
Dept: CARDIOLOGY | Facility: CLINIC | Age: 83
End: 2018-04-13
Payer: MEDICARE

## 2018-04-13 VITALS
SYSTOLIC BLOOD PRESSURE: 96 MMHG | OXYGEN SATURATION: 92 % | HEIGHT: 57 IN | DIASTOLIC BLOOD PRESSURE: 66 MMHG | HEART RATE: 124 BPM

## 2018-04-13 PROCEDURE — 93000 ELECTROCARDIOGRAM COMPLETE: CPT

## 2018-04-13 PROCEDURE — 99215 OFFICE O/P EST HI 40 MIN: CPT

## 2018-04-13 RX ORDER — DIGOXIN 125 UG/1
125 TABLET ORAL
Qty: 30 | Refills: 1 | Status: ACTIVE | COMMUNITY
Start: 2018-04-13 | End: 1900-01-01

## 2018-04-18 ENCOUNTER — INPATIENT (INPATIENT)
Facility: HOSPITAL | Age: 83
LOS: 12 days | DRG: 291 | End: 2018-05-01
Attending: INTERNAL MEDICINE | Admitting: INTERNAL MEDICINE
Payer: MEDICARE

## 2018-04-18 ENCOUNTER — APPOINTMENT (OUTPATIENT)
Dept: CARDIOLOGY | Facility: CLINIC | Age: 83
End: 2018-04-18
Payer: MEDICARE

## 2018-04-18 ENCOUNTER — NON-APPOINTMENT (OUTPATIENT)
Age: 83
End: 2018-04-18

## 2018-04-18 VITALS
HEART RATE: 112 BPM | OXYGEN SATURATION: 95 % | BODY MASS INDEX: 20.49 KG/M2 | HEIGHT: 57 IN | SYSTOLIC BLOOD PRESSURE: 99 MMHG | DIASTOLIC BLOOD PRESSURE: 68 MMHG | WEIGHT: 95 LBS

## 2018-04-18 VITALS
OXYGEN SATURATION: 98 % | SYSTOLIC BLOOD PRESSURE: 113 MMHG | DIASTOLIC BLOOD PRESSURE: 74 MMHG | HEART RATE: 88 BPM | RESPIRATION RATE: 28 BRPM | TEMPERATURE: 97 F

## 2018-04-18 DIAGNOSIS — I48.91 UNSPECIFIED ATRIAL FIBRILLATION: ICD-10-CM

## 2018-04-18 DIAGNOSIS — R06.09 OTHER FORMS OF DYSPNEA: ICD-10-CM

## 2018-04-18 DIAGNOSIS — J43.9 EMPHYSEMA, UNSPECIFIED: ICD-10-CM

## 2018-04-18 DIAGNOSIS — J18.9 PNEUMONIA, UNSPECIFIED ORGANISM: ICD-10-CM

## 2018-04-18 DIAGNOSIS — I15.9 SECONDARY HYPERTENSION, UNSPECIFIED: ICD-10-CM

## 2018-04-18 DIAGNOSIS — R06.02 SHORTNESS OF BREATH: ICD-10-CM

## 2018-04-18 DIAGNOSIS — Z98.89 OTHER SPECIFIED POSTPROCEDURAL STATES: Chronic | ICD-10-CM

## 2018-04-18 DIAGNOSIS — Z90.49 ACQUIRED ABSENCE OF OTHER SPECIFIED PARTS OF DIGESTIVE TRACT: Chronic | ICD-10-CM

## 2018-04-18 DIAGNOSIS — Z96.611 PRESENCE OF RIGHT ARTIFICIAL SHOULDER JOINT: Chronic | ICD-10-CM

## 2018-04-18 DIAGNOSIS — E87.1 HYPO-OSMOLALITY AND HYPONATREMIA: ICD-10-CM

## 2018-04-18 DIAGNOSIS — J90 PLEURAL EFFUSION, NOT ELSEWHERE CLASSIFIED: ICD-10-CM

## 2018-04-18 DIAGNOSIS — E78.5 HYPERLIPIDEMIA, UNSPECIFIED: ICD-10-CM

## 2018-04-18 DIAGNOSIS — I50.21 ACUTE SYSTOLIC (CONGESTIVE) HEART FAILURE: ICD-10-CM

## 2018-04-18 DIAGNOSIS — Z29.9 ENCOUNTER FOR PROPHYLACTIC MEASURES, UNSPECIFIED: ICD-10-CM

## 2018-04-18 LAB
ALBUMIN SERPL ELPH-MCNC: 3.2 G/DL — LOW (ref 3.3–5)
ALP SERPL-CCNC: 108 U/L — SIGNIFICANT CHANGE UP (ref 40–120)
ALT FLD-CCNC: 12 U/L — SIGNIFICANT CHANGE UP (ref 12–78)
ANION GAP SERPL CALC-SCNC: 10 MMOL/L — SIGNIFICANT CHANGE UP (ref 5–17)
APTT BLD: 34.4 SEC — SIGNIFICANT CHANGE UP (ref 27.5–37.4)
AST SERPL-CCNC: 19 U/L — SIGNIFICANT CHANGE UP (ref 15–37)
BASOPHILS # BLD AUTO: 0.1 K/UL — SIGNIFICANT CHANGE UP (ref 0–0.2)
BASOPHILS NFR BLD AUTO: 0.7 % — SIGNIFICANT CHANGE UP (ref 0–2)
BILIRUB SERPL-MCNC: 0.7 MG/DL — SIGNIFICANT CHANGE UP (ref 0.2–1.2)
BUN SERPL-MCNC: 22 MG/DL — SIGNIFICANT CHANGE UP (ref 7–23)
CALCIUM SERPL-MCNC: 8.9 MG/DL — SIGNIFICANT CHANGE UP (ref 8.5–10.1)
CHLORIDE SERPL-SCNC: 92 MMOL/L — LOW (ref 96–108)
CK MB BLD-MCNC: 5 % — HIGH (ref 0–3.5)
CK MB CFR SERPL CALC: 1.2 NG/ML — SIGNIFICANT CHANGE UP (ref 0–3.6)
CK SERPL-CCNC: 24 U/L — LOW (ref 26–192)
CO2 SERPL-SCNC: 28 MMOL/L — SIGNIFICANT CHANGE UP (ref 22–31)
CREAT SERPL-MCNC: 1 MG/DL — SIGNIFICANT CHANGE UP (ref 0.5–1.3)
EOSINOPHIL # BLD AUTO: 0.1 K/UL — SIGNIFICANT CHANGE UP (ref 0–0.5)
EOSINOPHIL NFR BLD AUTO: 0.6 % — SIGNIFICANT CHANGE UP (ref 0–6)
GLUCOSE SERPL-MCNC: 148 MG/DL — HIGH (ref 70–99)
HCT VFR BLD CALC: 34.8 % — SIGNIFICANT CHANGE UP (ref 34.5–45)
HGB BLD-MCNC: 12 G/DL — SIGNIFICANT CHANGE UP (ref 11.5–15.5)
INR BLD: 1.43 RATIO — HIGH (ref 0.88–1.16)
LACTATE SERPL-SCNC: 1.4 MMOL/L — SIGNIFICANT CHANGE UP (ref 0.7–2)
LYMPHOCYTES # BLD AUTO: 1.5 K/UL — SIGNIFICANT CHANGE UP (ref 1–3.3)
LYMPHOCYTES # BLD AUTO: 15.5 % — SIGNIFICANT CHANGE UP (ref 13–44)
MCHC RBC-ENTMCNC: 34.5 GM/DL — SIGNIFICANT CHANGE UP (ref 32–36)
MCHC RBC-ENTMCNC: 34.6 PG — HIGH (ref 27–34)
MCV RBC AUTO: 100.4 FL — HIGH (ref 80–100)
MONOCYTES # BLD AUTO: 0.8 K/UL — SIGNIFICANT CHANGE UP (ref 0–0.9)
MONOCYTES NFR BLD AUTO: 8.2 % — SIGNIFICANT CHANGE UP (ref 1–9)
NEUTROPHILS # BLD AUTO: 7.4 K/UL — SIGNIFICANT CHANGE UP (ref 1.8–7.4)
NEUTROPHILS NFR BLD AUTO: 75.1 % — SIGNIFICANT CHANGE UP (ref 43–77)
NT-PROBNP SERPL-SCNC: HIGH PG/ML (ref 0–450)
PLATELET # BLD AUTO: 420 K/UL — HIGH (ref 150–400)
POTASSIUM SERPL-MCNC: 3.7 MMOL/L — SIGNIFICANT CHANGE UP (ref 3.5–5.3)
POTASSIUM SERPL-SCNC: 3.7 MMOL/L — SIGNIFICANT CHANGE UP (ref 3.5–5.3)
PROT SERPL-MCNC: 6.6 G/DL — SIGNIFICANT CHANGE UP (ref 6–8.3)
PROTHROM AB SERPL-ACNC: 15.7 SEC — HIGH (ref 9.8–12.7)
RAPID RVP RESULT: SIGNIFICANT CHANGE UP
RBC # BLD: 3.47 M/UL — LOW (ref 3.8–5.2)
RBC # FLD: 14.9 % — HIGH (ref 10.3–14.5)
SODIUM SERPL-SCNC: 130 MMOL/L — LOW (ref 135–145)
TROPONIN I SERPL-MCNC: <.015 NG/ML — SIGNIFICANT CHANGE UP (ref 0.01–0.04)
WBC # BLD: 9.8 K/UL — SIGNIFICANT CHANGE UP (ref 3.8–10.5)
WBC # FLD AUTO: 9.8 K/UL — SIGNIFICANT CHANGE UP (ref 3.8–10.5)

## 2018-04-18 PROCEDURE — 99223 1ST HOSP IP/OBS HIGH 75: CPT | Mod: AI,GC

## 2018-04-18 PROCEDURE — 99285 EMERGENCY DEPT VISIT HI MDM: CPT

## 2018-04-18 PROCEDURE — 93000 ELECTROCARDIOGRAM COMPLETE: CPT

## 2018-04-18 PROCEDURE — 99223 1ST HOSP IP/OBS HIGH 75: CPT

## 2018-04-18 PROCEDURE — 71045 X-RAY EXAM CHEST 1 VIEW: CPT | Mod: 26

## 2018-04-18 PROCEDURE — 99215 OFFICE O/P EST HI 40 MIN: CPT

## 2018-04-18 PROCEDURE — 93010 ELECTROCARDIOGRAM REPORT: CPT

## 2018-04-18 PROCEDURE — 71250 CT THORAX DX C-: CPT | Mod: 26

## 2018-04-18 RX ORDER — ACETAMINOPHEN 500 MG
650 TABLET ORAL ONCE
Qty: 0 | Refills: 0 | Status: COMPLETED | OUTPATIENT
Start: 2018-04-18 | End: 2018-04-18

## 2018-04-18 RX ORDER — CEFTRIAXONE 500 MG/1
1 INJECTION, POWDER, FOR SOLUTION INTRAMUSCULAR; INTRAVENOUS ONCE
Qty: 0 | Refills: 0 | Status: COMPLETED | OUTPATIENT
Start: 2018-04-18 | End: 2018-04-18

## 2018-04-18 RX ORDER — ALIROCUMAB 75 MG/ML
0 INJECTION, SOLUTION SUBCUTANEOUS
Qty: 0 | Refills: 0 | COMMUNITY

## 2018-04-18 RX ORDER — APIXABAN 2.5 MG/1
2.5 TABLET, FILM COATED ORAL EVERY 12 HOURS
Qty: 0 | Refills: 0 | Status: DISCONTINUED | OUTPATIENT
Start: 2018-04-18 | End: 2018-04-22

## 2018-04-18 RX ORDER — CEFUROXIME AXETIL 250 MG
1 TABLET ORAL
Qty: 0 | Refills: 0 | COMMUNITY

## 2018-04-18 RX ORDER — TIOTROPIUM BROMIDE AND OLODATEROL 3.124; 2.736 UG/1; UG/1
2 SPRAY, METERED RESPIRATORY (INHALATION)
Qty: 0 | Refills: 0 | COMMUNITY

## 2018-04-18 RX ORDER — METOPROLOL TARTRATE 50 MG
1 TABLET ORAL
Qty: 0 | Refills: 0 | COMMUNITY

## 2018-04-18 RX ORDER — ALBUTEROL 90 UG/1
0 AEROSOL, METERED ORAL
Qty: 0 | Refills: 0 | COMMUNITY

## 2018-04-18 RX ORDER — SODIUM CHLORIDE 9 MG/ML
3 INJECTION INTRAMUSCULAR; INTRAVENOUS; SUBCUTANEOUS ONCE
Qty: 0 | Refills: 0 | Status: COMPLETED | OUTPATIENT
Start: 2018-04-18 | End: 2018-04-18

## 2018-04-18 RX ORDER — ALBUTEROL 90 UG/1
2 AEROSOL, METERED ORAL EVERY 6 HOURS
Qty: 0 | Refills: 0 | Status: DISCONTINUED | OUTPATIENT
Start: 2018-04-18 | End: 2018-04-27

## 2018-04-18 RX ORDER — CEFTRIAXONE 500 MG/1
1 INJECTION, POWDER, FOR SOLUTION INTRAMUSCULAR; INTRAVENOUS EVERY 24 HOURS
Qty: 0 | Refills: 0 | Status: DISCONTINUED | OUTPATIENT
Start: 2018-04-19 | End: 2018-04-20

## 2018-04-18 RX ORDER — FUROSEMIDE 40 MG
20 TABLET ORAL
Qty: 0 | Refills: 0 | Status: DISCONTINUED | OUTPATIENT
Start: 2018-04-18 | End: 2018-04-20

## 2018-04-18 RX ORDER — OMEPRAZOLE 10 MG/1
1 CAPSULE, DELAYED RELEASE ORAL
Qty: 0 | Refills: 0 | COMMUNITY

## 2018-04-18 RX ORDER — ONDANSETRON 8 MG/1
4 TABLET, FILM COATED ORAL ONCE
Qty: 0 | Refills: 0 | Status: COMPLETED | OUTPATIENT
Start: 2018-04-18 | End: 2018-04-18

## 2018-04-18 RX ORDER — AZITHROMYCIN 500 MG/1
500 TABLET, FILM COATED ORAL EVERY 24 HOURS
Qty: 0 | Refills: 0 | Status: DISCONTINUED | OUTPATIENT
Start: 2018-04-19 | End: 2018-04-20

## 2018-04-18 RX ORDER — PANTOPRAZOLE SODIUM 20 MG/1
40 TABLET, DELAYED RELEASE ORAL
Qty: 0 | Refills: 0 | Status: DISCONTINUED | OUTPATIENT
Start: 2018-04-18 | End: 2018-04-28

## 2018-04-18 RX ORDER — METOPROLOL TARTRATE 50 MG
50 TABLET ORAL DAILY
Qty: 0 | Refills: 0 | Status: DISCONTINUED | OUTPATIENT
Start: 2018-04-18 | End: 2018-04-22

## 2018-04-18 RX ORDER — FLUTICASONE PROPIONATE AND SALMETEROL 50; 250 UG/1; UG/1
1 POWDER ORAL; RESPIRATORY (INHALATION)
Qty: 0 | Refills: 0 | COMMUNITY

## 2018-04-18 RX ORDER — METOPROLOL TARTRATE 50 MG
1.5 TABLET ORAL
Qty: 0 | Refills: 0 | COMMUNITY

## 2018-04-18 RX ORDER — FUROSEMIDE 40 MG
1 TABLET ORAL
Qty: 0 | Refills: 0 | COMMUNITY

## 2018-04-18 RX ORDER — AZITHROMYCIN 500 MG/1
500 TABLET, FILM COATED ORAL ONCE
Qty: 0 | Refills: 0 | Status: COMPLETED | OUTPATIENT
Start: 2018-04-18 | End: 2018-04-18

## 2018-04-18 RX ADMIN — APIXABAN 2.5 MILLIGRAM(S): 2.5 TABLET, FILM COATED ORAL at 21:15

## 2018-04-18 RX ADMIN — AZITHROMYCIN 255 MILLIGRAM(S): 500 TABLET, FILM COATED ORAL at 14:55

## 2018-04-18 RX ADMIN — SODIUM CHLORIDE 3 MILLILITER(S): 9 INJECTION INTRAMUSCULAR; INTRAVENOUS; SUBCUTANEOUS at 13:29

## 2018-04-18 RX ADMIN — CEFTRIAXONE 100 GRAM(S): 500 INJECTION, POWDER, FOR SOLUTION INTRAMUSCULAR; INTRAVENOUS at 14:28

## 2018-04-18 RX ADMIN — Medication 650 MILLIGRAM(S): at 20:14

## 2018-04-18 RX ADMIN — ONDANSETRON 4 MILLIGRAM(S): 8 TABLET, FILM COATED ORAL at 15:35

## 2018-04-18 NOTE — CONSULT NOTE ADULT - SUBJECTIVE AND OBJECTIVE BOX
[facility] - Daniella Kenyon, Nola, Ghanshyam, Jhonatan Quintero  Office Number: [phone]    Initial Consult Note    CHIEF COMPLAINT: Patient is a 83y old  Female who presents with a chief complaint of sob.     HPI: 83F w/pmh of COPD not on home 02, HTN, HLD, Afib on Eliquis, CHF, multiple compression fractures and ascending penetrating aortic ulcer presenting with sob for 3-4 days. States that she has been having increasing sob for the past few days. States that she also has nausea and regurgitated clear fluids around 3-4 days ago. Pt has felt increasing weakness and lethargy with decreased appetite as well. She has tried nebulizers and inhalers for her sob without relief of her symptoms. Patient is unable to lie down flat due to compression fractures and difficulty breathing. Denies headache, fever, chills, vomiting, cough, chest pain, palpitations, peripheral edema and abdominal pain. No sick contacts. No recent travel. She ambulates with a cane. Patient has a history of spasmodic dysphonia which she was previously getting botox shots for but had an episode of aspiration so she stopped the injections.     PAST MEDICAL & SURGICAL HISTORY:  Compression fracture: Aug 2015  Carbon monoxide poisonin  Pulmonary emphysema, unspecified emphysema type  Secondary hypertension  Atrial fibrillation, unspecified type  History of arthroscopy of knee  History of appendectomy:   History of right shoulder replacement:     SOCIAL HISTORY:  No tobacco, ethanol, or drug abuse.    FAMILY HISTORY:  No pertinent family history in first degree relatives    No family history of acute MI or sudden cardiac death.    MEDICATIONS  (STANDING): as in med rec    Allergies: No Known Allergies    REVIEW OF SYSTEMS:  CONSTITUTIONAL: +weakness, no fevers or chills  EYES/ENT: +regurgitation, No visual changes;  No vertigo or throat pain   NECK: + pain or stiffness  RESPIRATORY: No cough, wheezing, hemoptysis; No shortness of breath  CARDIOVASCULAR: No chest pain or palpitations  GASTROINTESTINAL: No abdominal pain. No nausea, vomiting, or hematemesis; No diarrhea or constipation. No melena or hematochezia.  GENITOURINARY: No dysuria, frequency or hematuria  NEUROLOGICAL: No numbness or weakness  SKIN: No itching or rash  All other review of systems is negative unless indicated above    VITAL SIGNS:   Vital Signs Last 24 Hrs  T(C): 36.8 (2018 15:40), Max: 36.8 (2018 15:40)  T(F): 98.2 (2018 15:40), Max: 98.2 (2018 15:40)  HR: 91 (2018 16:35) (86 - 91)  BP: 89/53 (2018 16:35) (83/50 - 113/74)  BP(mean): --  RR: 16 (2018 16:35) (16 - 28)  SpO2: 100% (2018 16:35) (98% - 100%)    On Exam:  Constitutional: NAD, alert and oriented x 3  Lungs: +coarse breath sounds, +crackles RUL and JENNIFER, No wheezing, rales or rhonchi  Cardiovascular: irregularly irregular.  S1 and S2 positive.  No murmurs, rubs, gallops or clicks  Gastrointestinal: soft, nontender, nondistended, no guarding, no rebound  Lymph: No peripheral edema. No cervical lymphadenopathy.  Neurological: Alert, no focal deficits  Skin: No rashes or ulcers   Psych:  Mood & affect appropriate.    LABS: All Labs Reviewed:                        12.0   9.8   )-----------( 420      ( 2018 13:37 )             34.8     2018 13:37    130    |  92     |  22     ----------------------------<  148    3.7     |  28     |  1.00     Ca    8.9        2018 13:37    TPro  6.6    /  Alb  3.2    /  TBili  0.7    /  DBili  x      /  AST  19     /  ALT  12     /  AlkPhos  108    2018 13:37    PT/INR - ( 2018 13:37 )   PT: 15.7 sec;   INR: 1.43 ratio    PTT - ( 2018 13:37 )  PTT:34.4 sec  CARDIAC MARKERS ( 2018 13:37 )  <.015 ng/mL / x     / 24 U/L / x     / 1.2 ng/mL    Blood Culture:    @ 13:37  Pro Bnp [id number]    RADIOLOGY:  Xray Chest 1 View- PORTABLE-Routine (18 @ 13:15) :Small right pleural effusion, question early infiltrate.    EKG: Afib RVR, ,

## 2018-04-18 NOTE — PATIENT PROFILE ADULT. - VISION (WITH CORRECTIVE LENSES IF THE PATIENT USUALLY WEARS THEM):
wears glasses Partially impaired: cannot see medication labels or newsprint, but can see obstacles in path, and the surrounding layout; can count fingers at arm's length/wears glasses

## 2018-04-18 NOTE — H&P ADULT - NSHPREVIEWOFSYSTEMS_GEN_ALL_CORE
CONSTITUTIONAL: +weakness, no fevers or chills  EYES/ENT: +regurgitation, No visual changes;  No vertigo or throat pain   NECK: + pain or stiffness  RESPIRATORY: No cough, wheezing, hemoptysis; No shortness of breath  CARDIOVASCULAR: No chest pain or palpitations  GASTROINTESTINAL: No abdominal pain. No nausea, vomiting, or hematemesis; No diarrhea or constipation. No melena or hematochezia.  GENITOURINARY: No dysuria, frequency or hematuria  NEUROLOGICAL: No numbness or weakness  SKIN: No itching or rash  PSYCHIATRY: no anxiety or depression

## 2018-04-18 NOTE — ED PROVIDER NOTE - CARE PLAN
Principal Discharge DX:	SOB (shortness of breath)  Secondary Diagnosis:	CHF (congestive heart failure)  Secondary Diagnosis:	Pleural effusion

## 2018-04-18 NOTE — H&P ADULT - HISTORY OF PRESENT ILLNESS
83F w/pmh of COPD not on home 02, HTN, HLD, Afib on Eliquis, CHF, multiple compression fractures and ascending penetrating aortic ulcer presenting with sob for 3-4 days. States that she has been having increasing sob for the past few days. States that she also has nausea and regurgitated clear fluids around 3-4 days ago. Pt has felt increasing weakness and lethargy with decreased appetite as well. She has tried nebulizers and inhalers for her sob without relief of her symptoms. Patient is unable to lie down flat due to compression fractures and difficulty breathing. Denies headache, fever, chills, vomiting, cough, chest pain, palpitations, peripheral edema and abdominal pain. No sick contacts. No recent travel. She ambulates with a cane. Patient has a history of spasmodic dysphonia which she was previously getting botox shots for but had an episode of aspiration so she stopped the injections.    In ED, patient was hypotensive with BP of 88/53. Remainder of vitals were within normal range. CBC  unremarkable, BMP significant for hyponatremia. ProBNP was 25,370. RVP negative. CT chest ordered and pending. EKG revealed.................CXR showed small right pleural effusion, questionable infiltrate. Patient given Rocephin and zithromax in ED. 83F w/pmh of COPD not on home 02, HTN, HLD, Afib on Eliquis, CHF, multiple compression fractures and ascending penetrating aortic ulcer presenting with sob for 3-4 days. States that she has been having increasing sob for the past few days. States that she also has nausea and regurgitated clear fluids around 3-4 days ago. Pt has felt increasing weakness and lethargy with decreased appetite as well. She has tried nebulizers and inhalers for her sob without relief of her symptoms. Patient is unable to lie down flat due to compression fractures and difficulty breathing. Denies headache, fever, chills, vomiting, cough, chest pain, palpitations, peripheral edema and abdominal pain. No sick contacts. No recent travel. She ambulates with a cane. Patient has a history of spasmodic dysphonia which she was previously getting botox shots for but had an episode of aspiration so she stopped the injections.    In ED, patient was hypotensive with BP of 88/53. Remainder of vitals were within normal range. CBC  unremarkable, BMP significant for hyponatremia. ProBNP was 25,370. RVP negative. CT chest ordered and pending. Preliminary read of EKG revealed afib with ventricular rate of 85. CXR showed small right pleural effusion, questionable infiltrate. CT chest revealed small right greater than left pleural effusions. Scattered patchy right upper lobe pulmonary infiltrates new from the prior study. Emphysema. Stable peripherally calcified lesion in the spleen. Patient given Rocephin and zithromax in ED. 83F w/pmh of COPD not on home 02, HTN, HLD, Afib on Eliquis, CHF, multiple compression fractures and ascending penetrating aortic ulcer presenting with sob for 3-4 days. States that she has been having increasing sob for the past few days. States that she also has nausea and regurgitated clear fluids around 3-4 days ago. Pt has felt increasing weakness and lethargy with decreased appetite as well. She has tried nebulizers and inhalers for her sob without relief of her symptoms. Patient is unable to lie down flat due to compression fractures and difficulty breathing. Denies headache, fever, chills, vomiting, cough, chest pain, palpitations, peripheral edema and abdominal pain. No sick contacts. No recent travel. She ambulates with a cane. Patient has a history of spasmodic dysphonia which she was previously getting botox shots for but had an episode of aspiration so she stopped the injections.    In ED, patient was hypotensive with BP of 88/53, improved to 106/60. Remainder of vitals were within normal range. CBC  unremarkable, BMP significant for hyponatremia. ProBNP was 25,370. RVP negative. CT chest ordered and pending. Preliminary read of EKG revealed afib with ventricular rate of 85. CXR showed small right pleural effusion, questionable infiltrate. CT chest revealed small right greater than left pleural effusions. Scattered patchy right upper lobe pulmonary infiltrates new from the prior study. Emphysema. Stable peripherally calcified lesion in the spleen. Patient given Rocephin and zithromax in ED.

## 2018-04-18 NOTE — H&P ADULT - PROBLEM SELECTOR PLAN 6
Normally takes metoprolol  daily, will decrease to 50 daily with low BP -Patient takes Praluent pen 2x monthy  -Will resume upon discharge

## 2018-04-18 NOTE — ED ADULT NURSE NOTE - CHPI ED SYMPTOMS NEG
no body aches/no chills/no edema/no fever/no wheezing/no diaphoresis/no headache/no hemoptysis/no cough

## 2018-04-18 NOTE — H&P ADULT - ASSESSMENT
83F w/pmh of COPD not on home 02, HTN, HLD, Afib on Eliquis, CHF, multiple compression fractures and ascending penetrating aortic ulcer presenting with sob for 3-4 days, admitted for CHF exacerbation 83F w/pmh of COPD not on home 02, HTN, HLD, Afib on Eliquis, CHF, multiple compression fractures and ascending penetrating aortic ulcer presenting with sob for 3-4 days, admitted for CHF exacerbation.

## 2018-04-18 NOTE — H&P ADULT - PMH
Atrial fibrillation, unspecified type    Carbon monoxide poisoning  2010  CHF (congestive heart failure)    Compression fracture  Aug 2015  Pulmonary emphysema, unspecified emphysema type    Secondary hypertension

## 2018-04-18 NOTE — H&P ADULT - PROBLEM SELECTOR PLAN 8
IMPROVE VTE Individual Risk Assessment          RISK                                                          Points  [  ] Previous VTE                                                3  [  ] Thrombophilia                                             2  [ X ] Lower limb paralysis                                   2        (unable to hold up >15 seconds)    [  ] Current Cancer                                             2         (within 6 months)  [  ] Immobilization > 24 hrs                              1  [  ] ICU/CCU stay > 24 hours                             1  [ X ] Age > 60                                                         1    IMPROVE VTE Score: 3, continue with Eliquis for afib

## 2018-04-18 NOTE — H&P ADULT - PROBLEM SELECTOR PLAN 4
Normally takes metoprolol  daily, will decrease to 5o daily with low BP  CHADVASC score of 5, continue anticoagulation with Eliquis 2.5 daily Normally takes metoprolol  daily, will decrease to 5o daily with low BP  CHADVASC score of 5, continue anticoagulation with Eliquis 2.5 BID

## 2018-04-18 NOTE — ED ADULT NURSE NOTE - OBJECTIVE STATEMENT
received pt in bed #12a Pt A&O c/o SOB x 1 weeks & was sent from Dr Carr office for decreased breath sounds on the left & crackles. Pts o2 sat 98% on 2LNC CM placed EKG done Pt seen by THOMAS Suresh Will monitor received pt in bed #12a Pt A&O c/o SOB x 1 weeks & was sent from Dr Carr office for decreased breath sounds on the left & crackles. Pts o2 sat 98% on 2LNC CM placed w/ afib EKG done Pt seen by THOMAS Suresh Will monitor

## 2018-04-18 NOTE — H&P ADULT - PROBLEM SELECTOR PLAN 3
Small possible effusion  Monitor for symptoms Continue with fluid restriction  Monitor with morning BMP

## 2018-04-18 NOTE — ED PROVIDER NOTE - OBJECTIVE STATEMENT
pt is a 84yo female BIB EMS with pmhx of A-fib on eliquis, CHF on lasix, COPD, AORTA ULCER, GERD, c/o dyspnea x 1 week. pt reports increased sob, unable to ambulate at baseline with cough, nonproductive. pt reports she was at dr sharp office, PMD, who advised pt to come to ed for eval. pt denies fever, cp, n/v/d, abd pain. pt is a 82yo female BIB EMS with pmhx of A-fib on eliquis, CHF on lasix, COPD, AORTA ULCER, GERD, c/o dyspnea x 1 week. pt reports increased sob, unable to ambulate at baseline with cough, nonproductive. pt reports she was at dr sharp office, cardio, who advised pt to come to ed for eval. pt denies fever, cp, n/v/d, abd pain.  pmd: mamta

## 2018-04-18 NOTE — H&P ADULT - NSHPSOCIALHISTORY_GEN_ALL_CORE
Patient lives with sister, ambulates with walker and cane  20 pack year tobacco history, quit 20 years   Occasionla alcohol and illicit drug use  Colonoscopy 2 years ago was normal  Mammogram last year was normal  Retired supervisor for [employer] Patient lives with sister, ambulates with walker and cane  20 pack year tobacco history, quit 20 years   Occasionally alcohol and illicit drug use  Colonoscopy 2 years ago was normal  Mammogram last year was normal  Retired supervisor for [employer]

## 2018-04-18 NOTE — CONSULT NOTE ADULT - ASSESSMENT
83F w/pmh of COPD not on home 02, HTN, HLD, Afib on Eliquis, CHF, multiple compression fractures and ascending penetrating aortic ulcer presenting with sob.   - Likely multifactorial 2/2 CHF exacerbation vs. COPD vs. aspiration pneumonia  - No clear evidence of acute ischemia, trops negative x 1.  - ECHO showed   - BP low. Would give metoprolol 50mg qd.   - ProBNP elevated. Would start Lasix 20mg IV qd. Monitor BP as patient has been hypotensive.   - Pt has hx of chronic afib on Eliquis. CHADsVASc score 5. Continue Eliquis 2.5 mg BID.   - Follow up CT chest as per Dr. raya.   - Monitor and replete lytes, keep K>4, Mg>2  - Other cardiovascular workup will depend on clinical course.  - All other workup per primary team  - Will follow 83F w/pmh of COPD not on home 02, HTN, HLD, Afib on Eliquis, CHF, multiple compression fractures and ascending penetrating aortic ulcer presenting with sob.   - Likely multifactorial 2/2 CHF exacerbation vs. COPD vs. aspiration pneumonia  - No clear evidence of acute ischemia, trops negative x 1. Trend troponins.   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 Repeat ECHO.   - BP low. Would decrease metoprolol 50mg qd.   - ProBNP elevated. Would start Lasix 20mg BID IV qd. Monitor BP as patient has been hypotensive.   - Pt has hx of chronic afib on Eliquis. CHADsVASc score 5. Continue Eliquis 2.5 mg BID.   - Follow up CT chest  - Monitor and replete lytes, keep K>4, Mg>2  - Other cardiovascular workup will depend on clinical course.  - Continue to monitor telemetry  - All other workup per primary team  - Will follow

## 2018-04-18 NOTE — H&P ADULT - NSHPPHYSICALEXAM_GEN_ALL_CORE
ICU Vital Signs Last 24 Hrs  T(C): 36.8 (18 Apr 2018 15:40), Max: 36.8 (18 Apr 2018 15:40)  T(F): 98.2 (18 Apr 2018 15:40), Max: 98.2 (18 Apr 2018 15:40)  HR: 91 (18 Apr 2018 16:35) (86 - 91)  BP: 89/53 (18 Apr 2018 16:35) (83/50 - 113/74)  RR: 16 (18 Apr 2018 16:35) (16 - 28)  SpO2: 100% (18 Apr 2018 16:35) (98% - 100%)    Physical Exam  Constitutional: Elderly, frail, cachectic female in NAD  HEENT: Normocephalic, Atraumatic, moist mucous membranes  Pulmonary: Decreased breath sounds at base of lungs, No wheezing, rales or rhonchi  Cardiovascular: irregularly irregular.  S1 and S2 positive.    Gastrointestinal: soft, nontender, nondistended, no guarding, no rebound  Lymph: No peripheral edema. No cervical lymphadenopathy.  Neurological: Alert, follows commands, intact motor and sensation  MSK: NO joint edema or erythema, Kyphosis noted in T spine  Skin: No rashes or ulcers   Psych:  Mood & affect appropriate.

## 2018-04-18 NOTE — H&P ADULT - ATTENDING COMMENTS
83F w/pmh of COPD not on home 02, HTN, HLD, Afib on Eliquis, CHF, multiple compression fractures and ascending penetrating aortic ulcer presenting with sob for 3-4 days, admitted for CHF exacerbation.  Moitor BP and check procal. c/w IV antibx and IV lasix.  Dr. Gillis was informed.

## 2018-04-18 NOTE — H&P ADULT - PROBLEM SELECTOR PLAN 2
-XRAY shows questionable infiltrate  -CT pending  -Received dose of Rocephin and zithromax  -Hold further abx until CT results  -Monitor O2 sat -Community Acquired  -CT revealed small right greater than left pleural effusions. Scattered patchy right upper lobe pulmonary infiltrates new from the prior study.  -Received dose of Rocephin and zithromax  -Continue with abx regimen  -Monitor O2 sat  -Order procalcitonin

## 2018-04-18 NOTE — H&P ADULT - PROBLEM SELECTOR PLAN 7
IMPROVE VTE Individual Risk Assessment          RISK                                                          Points  [  ] Previous VTE                                                3  [  ] Thrombophilia                                             2  [ X ] Lower limb paralysis                                   2        (unable to hold up >15 seconds)    [  ] Current Cancer                                             2         (within 6 months)  [  ] Immobilization > 24 hrs                              1  [  ] ICU/CCU stay > 24 hours                             1  [ X ] Age > 60                                                         1    IMPROVE VTE Score: 3, continue with Eliquis for afib Normally takes metoprolol  daily, will decrease to 50 daily with low BP

## 2018-04-18 NOTE — ED PROVIDER NOTE - PROGRESS NOTE DETAILS
dr betts cardio will see pt. labs and cxr reviewed. will admit for copd exacerbation vs pna. discussed with dr vega who will admit pt.

## 2018-04-18 NOTE — H&P ADULT - PROBLEM SELECTOR PLAN 1
Admit to telemetry  IV lasix 20mg BID if blood pressure can tolerate  Strict I and O  Echo pending  Dr. Mae consulted  DASH diet  Out of bed with assist  Fall precautions  CBC and BMP in AM Admit to telemetry  IV lasix 20mg BID if blood pressure can tolerate  Strict I and O  Fluid restriction  Echo pending  Dr. Mae consulted  DASH diet  Out of bed with assist  Fall precautions  CBC and BMP, BNP in AM

## 2018-04-18 NOTE — ED PROVIDER NOTE - ATTENDING CONTRIBUTION TO CARE
pt referred by md for 1 week worsening sob, non-productive cough, and generalized weakness, unable to ambualte. no fevers, chills, cp, d/n/v.  s1s2 rrr, lungs crackles right base, abd soft, nt

## 2018-04-19 DIAGNOSIS — D64.9 ANEMIA, UNSPECIFIED: ICD-10-CM

## 2018-04-19 DIAGNOSIS — R33.9 RETENTION OF URINE, UNSPECIFIED: ICD-10-CM

## 2018-04-19 LAB
ANION GAP SERPL CALC-SCNC: 9 MMOL/L — SIGNIFICANT CHANGE UP (ref 5–17)
BASOPHILS # BLD AUTO: 0.1 K/UL — SIGNIFICANT CHANGE UP (ref 0–0.2)
BASOPHILS NFR BLD AUTO: 1.1 % — SIGNIFICANT CHANGE UP (ref 0–2)
BUN SERPL-MCNC: 17 MG/DL — SIGNIFICANT CHANGE UP (ref 7–23)
CALCIUM SERPL-MCNC: 8 MG/DL — LOW (ref 8.5–10.1)
CHLORIDE SERPL-SCNC: 96 MMOL/L — SIGNIFICANT CHANGE UP (ref 96–108)
CO2 SERPL-SCNC: 27 MMOL/L — SIGNIFICANT CHANGE UP (ref 22–31)
CREAT SERPL-MCNC: 0.81 MG/DL — SIGNIFICANT CHANGE UP (ref 0.5–1.3)
EOSINOPHIL # BLD AUTO: 0.2 K/UL — SIGNIFICANT CHANGE UP (ref 0–0.5)
EOSINOPHIL NFR BLD AUTO: 2.3 % — SIGNIFICANT CHANGE UP (ref 0–6)
GLUCOSE SERPL-MCNC: 94 MG/DL — SIGNIFICANT CHANGE UP (ref 70–99)
HCT VFR BLD CALC: 30.4 % — LOW (ref 34.5–45)
HGB BLD-MCNC: 10.3 G/DL — LOW (ref 11.5–15.5)
LYMPHOCYTES # BLD AUTO: 2 K/UL — SIGNIFICANT CHANGE UP (ref 1–3.3)
LYMPHOCYTES # BLD AUTO: 25.2 % — SIGNIFICANT CHANGE UP (ref 13–44)
MCHC RBC-ENTMCNC: 33.9 GM/DL — SIGNIFICANT CHANGE UP (ref 32–36)
MCHC RBC-ENTMCNC: 34.2 PG — HIGH (ref 27–34)
MCV RBC AUTO: 101 FL — HIGH (ref 80–100)
MONOCYTES # BLD AUTO: 0.9 K/UL — SIGNIFICANT CHANGE UP (ref 0–0.9)
MONOCYTES NFR BLD AUTO: 11.8 % — HIGH (ref 1–9)
NEUTROPHILS # BLD AUTO: 4.7 K/UL — SIGNIFICANT CHANGE UP (ref 1.8–7.4)
NEUTROPHILS NFR BLD AUTO: 59.6 % — SIGNIFICANT CHANGE UP (ref 43–77)
PLATELET # BLD AUTO: 323 K/UL — SIGNIFICANT CHANGE UP (ref 150–400)
POTASSIUM SERPL-MCNC: 3.1 MMOL/L — LOW (ref 3.5–5.3)
POTASSIUM SERPL-SCNC: 3.1 MMOL/L — LOW (ref 3.5–5.3)
RBC # BLD: 3.01 M/UL — LOW (ref 3.8–5.2)
RBC # FLD: 15.7 % — HIGH (ref 10.3–14.5)
SODIUM SERPL-SCNC: 132 MMOL/L — LOW (ref 135–145)
WBC # BLD: 7.9 K/UL — SIGNIFICANT CHANGE UP (ref 3.8–10.5)
WBC # FLD AUTO: 7.9 K/UL — SIGNIFICANT CHANGE UP (ref 3.8–10.5)

## 2018-04-19 PROCEDURE — 99233 SBSQ HOSP IP/OBS HIGH 50: CPT | Mod: GC

## 2018-04-19 PROCEDURE — 99233 SBSQ HOSP IP/OBS HIGH 50: CPT

## 2018-04-19 RX ORDER — POTASSIUM CHLORIDE 20 MEQ
40 PACKET (EA) ORAL EVERY 4 HOURS
Qty: 0 | Refills: 0 | Status: COMPLETED | OUTPATIENT
Start: 2018-04-19 | End: 2018-04-19

## 2018-04-19 RX ORDER — LOSARTAN POTASSIUM 100 MG/1
1 TABLET, FILM COATED ORAL
Qty: 0 | Refills: 0 | COMMUNITY

## 2018-04-19 RX ADMIN — Medication 40 MILLIEQUIVALENT(S): at 11:36

## 2018-04-19 RX ADMIN — AZITHROMYCIN 255 MILLIGRAM(S): 500 TABLET, FILM COATED ORAL at 14:32

## 2018-04-19 RX ADMIN — APIXABAN 2.5 MILLIGRAM(S): 2.5 TABLET, FILM COATED ORAL at 05:22

## 2018-04-19 RX ADMIN — APIXABAN 2.5 MILLIGRAM(S): 2.5 TABLET, FILM COATED ORAL at 17:29

## 2018-04-19 RX ADMIN — Medication 40 MILLIEQUIVALENT(S): at 13:56

## 2018-04-19 RX ADMIN — PANTOPRAZOLE SODIUM 40 MILLIGRAM(S): 20 TABLET, DELAYED RELEASE ORAL at 05:22

## 2018-04-19 RX ADMIN — Medication 20 MILLIGRAM(S): at 05:22

## 2018-04-19 RX ADMIN — Medication 20 MILLIGRAM(S): at 17:29

## 2018-04-19 RX ADMIN — CEFTRIAXONE 100 GRAM(S): 500 INJECTION, POWDER, FOR SOLUTION INTRAMUSCULAR; INTRAVENOUS at 13:56

## 2018-04-19 NOTE — PROGRESS NOTE ADULT - PROBLEM SELECTOR PLAN 4
Normally takes metoprolol  daily, will decrease to 5o daily with low BP  CHADVASC score of 5, continue anticoagulation with Eliquis 2.5 BID Hgb dropped from12 to 10.3, patient did not receive fluids  No evidence of bleed  Will order iron, b12, folate  Monitor Hgb dropped from 12 to 10.3, patient did not receive fluids  No evidence of bleed  Will order iron, b12, folate  Monitor

## 2018-04-19 NOTE — PROGRESS NOTE ADULT - PROBLEM SELECTOR PLAN 8
IMPROVE VTE Individual Risk Assessment          RISK                                                          Points  [  ] Previous VTE                                                3  [  ] Thrombophilia                                             2  [ X ] Lower limb paralysis                                   2        (unable to hold up >15 seconds)    [  ] Current Cancer                                             2         (within 6 months)  [  ] Immobilization > 24 hrs                              1  [  ] ICU/CCU stay > 24 hours                             1  [ X ] Age > 60                                                         1    IMPROVE VTE Score: 3, continue with Eliquis for afib -Patient takes Praluent pen 2x monthy  -Will resume upon discharge

## 2018-04-19 NOTE — PROGRESS NOTE ADULT - PROBLEM SELECTOR PLAN 6
-Patient takes Praluent pen 2x monthy  -Will resume upon discharge Continue metoprolol 50mg daily with low BP  CHADVASC score of 5, continue anticoagulation with Eliquis 2.5 BID Continue metoprolol 50mg daily with low BP  [id number] score of 5, continue anticoagulation with Eliquis 2.5 BID

## 2018-04-19 NOTE — PHYSICAL THERAPY INITIAL EVALUATION ADULT - ADDITIONAL COMMENTS
pt lives in sister's house w/ chair lift.  Pt report since January, pt has progressively gotten weaker w/ decrease ambulation. Pt has 3 hour home health aide.

## 2018-04-19 NOTE — PROGRESS NOTE ADULT - ASSESSMENT
83F w/pmh of COPD not on home 02, HTN, HLD, Afib on Eliquis, CHF, multiple compression fractures and ascending penetrating aortic ulcer presenting with sob.     - Likely multifactorial 2/2 CHF exacerbation vs. COPD vs. aspiration pneumonia  - No clear evidence of acute ischemia, trops negative x 1. Trend troponins.   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 Repeat ECHO here.   - BP low. Remains on Toprol XL, which can be continued at current dose.  - ProBNP elevated. Continue Lasix 20mg BID IV. Monitor BP as patient has been hypotensive. Switch to po tomorrow.  - Pt has hx of chronic afib on Eliquis. CHADsVASc score 5. Continue Eliquis 2.5 mg BID (with normal renal function, she meets criteria for 5 bid, though continue 2.5 for now given her drop in Hb). Watch Hb  - CT chest with small bilateral effusions  - PE is also on the differential given her tachycardia and hypoxia.  - Monitor and replete lytes, keep K>4, Mg>2  - Other cardiovascular workup will depend on clinical course.  - Continue to monitor telemetry  - All other workup per primary team  - Will follow

## 2018-04-19 NOTE — PHYSICAL THERAPY INITIAL EVALUATION ADULT - PERTINENT HX OF CURRENT PROBLEM, REHAB EVAL
83F present with increasing SOB for 3-4 days. In ED, patient was hypotensive with BP of 88/53, improved to 106/60. CXR showed small right pleural effusion, ?infiltrate. CT chest: small right greater than left pleural effusions. Scattered patchy right upper lobe pulmonary infiltrates (new). Admitted for CHF exacerbation.

## 2018-04-19 NOTE — PROGRESS NOTE ADULT - PROBLEM SELECTOR PLAN 5
Continue with ProAIR  Supplemental oxygen Patient has history of urinary retention for past 2 weeks  She was here on April 9th, CT stone hunt revealed bilateral nonobstructing nephrolithiasis, UA was negative. She's had no urinary symptoms  Patient now urinating without issues  If continues, consider a urology consult

## 2018-04-19 NOTE — PROGRESS NOTE ADULT - SUBJECTIVE AND OBJECTIVE BOX
83F w/pmh of COPD not on home 02, HTN, HLD, Afib on Eliquis, CHF, multiple compression fractures and ascending penetrating aortic ulcer presenting with sob for 3-4 days. States that she has been having increasing sob for the past few days. States that she also has nausea and regurgitated clear fluids around 3-4 days ago. Pt has felt increasing weakness and lethargy with decreased appetite as well. She has tried nebulizers and inhalers for her sob without relief of her symptoms. Patient is unable to lie down flat due to compression fractures and difficulty breathing. Denies headache, fever, chills, vomiting, cough, chest pain, palpitations, peripheral edema and abdominal pain. No sick contacts. No recent travel. She ambulates with a cane. Patient has a history of spasmodic dysphonia which she was previously getting botox shots for but had an episode of aspiration so she stopped the injections.    In ED, patient was hypotensive with BP of 88/53, improved to 106/60. Remainder of vitals were within normal range. CBC  unremarkable, BMP significant for hyponatremia. ProBNP was 25,370. RVP negative. CT chest ordered and pending. Preliminary read of EKG revealed afib with ventricular rate of 85. CXR showed small right pleural effusion, questionable infiltrate. CT chest revealed small right greater than left pleural effusions. Scattered patchy right upper lobe pulmonary infiltrates new from the prior study. Emphysema. Stable peripherally calcified lesion in the spleen. Patient given Rocephin and zithromax in ED.     INTERVAL HPI/OVERNIGHT EVENTS: Patient seen and examined at bedside. Patient says she feels better interm of the shortness of breath    MEDICATIONS  (STANDING):  apixaban 2.5 milliGRAM(s) Oral every 12 hours  azithromycin  IVPB 500 milliGRAM(s) IV Intermittent every 24 hours  cefTRIAXone   IVPB 1 Gram(s) IV Intermittent every 24 hours  furosemide   Injectable 20 milliGRAM(s) IV Push two times a day  metoprolol succinate ER 50 milliGRAM(s) Oral daily  pantoprazole    Tablet 40 milliGRAM(s) Oral before breakfast    MEDICATIONS  (PRN):  ALBUTerol    90 MICROgram(s) HFA Inhaler 2 Puff(s) Inhalation every 6 hours PRN Bronchospasm      Allergies    No Known Allergies    Intolerances    ONSTITUTIONAL: +weakness, no fevers or chills  EYES/ENT: +regurgitation, No visual changes;  No vertigo or throat pain   NECK: + pain or stiffness  RESPIRATORY: No cough, wheezing, hemoptysis; No shortness of breath  CARDIOVASCULAR: No chest pain or palpitations  GASTROINTESTINAL: No abdominal pain. No nausea, vomiting, or hematemesis; No diarrhea or constipation. No melena or hematochezia.  GENITOURINARY: No dysuria, frequency or hematuria  NEUROLOGICAL: No numbness or weakness  MSK: Back pain  SKIN: No itching or rash      Vital Signs Last 24 Hrs  T(C): 36.6 (19 Apr 2018 15:18), Max: 36.7 (18 Apr 2018 18:45)  T(F): 97.8 (19 Apr 2018 15:18), Max: 98 (18 Apr 2018 18:45)  HR: 102 (19 Apr 2018 15:18) (86 - 103)  BP: 121/74 (19 Apr 2018 11:45) (83/50 - 121/74)  RR: 17 (19 Apr 2018 15:18) (16 - 18)  SpO2: 95% (19 Apr 2018 15:18) (92% - 100%)    04-18 @ 07:01 - 04-19 @ 07:00  --------------------------------------------------------  IN: 240 mL / OUT: 450 mL / NET: -210 mL    04-19 @ 07:01 - 04-19 @ 15:44  --------------------------------------------------------  IN: 220 mL / OUT: 150 mL / NET: 70 mL    Physical Exam  Constitutional: Elderly, frail, cachectic female in NAD  HEENT: Normocephalic, Atraumatic, moist mucous membranes  Pulmonary: Decreased breath sounds at base of lungs, No wheezing, rales or rhonchi  Cardiovascular: irregularly irregular.  S1 and S2 positive.    Gastrointestinal: soft, nontender, nondistended, no guarding, no rebound  Lymph: No peripheral edema. No cervical lymphadenopathy.  Neurological: Alert, follows commands, intact motor and sensation  MSK: NO joint edema or erythema, Kyphosis noted in T spine  Skin: No rashes or ulcers   Psych:  Mood & affect appropriate.      LABS:                        10.3   7.9   )-----------( 323      ( 19 Apr 2018 05:47 )             30.4     04-19    132<L>  |  96  |  17  ----------------------------<  94  3.1<L>   |  27  |  0.81    Ca    8.0<L>      19 Apr 2018 05:47    TPro  6.6  /  Alb  3.2<L>  /  TBili  0.7  /  DBili  x   /  AST  19  /  ALT  12  /  AlkPhos  108  04-18    PT/INR - ( 18 Apr 2018 13:37 )   PT: 15.7 sec;   INR: 1.43 ratio         PTT - ( 18 Apr 2018 13:37 )  PTT:34.4 sec 83F w/pmh of COPD not on home 02, HTN, HLD, Afib on Eliquis, CHF, multiple compression fractures and ascending penetrating aortic ulcer presenting with sob for 3-4 days. States that she has been having increasing sob for the past few days. States that she also has nausea and regurgitated clear fluids around 3-4 days ago. Pt has felt increasing weakness and lethargy with decreased appetite as well. She has tried nebulizers and inhalers for her sob without relief of her symptoms. Patient is unable to lie down flat due to compression fractures and difficulty breathing. Denies headache, fever, chills, vomiting, cough, chest pain, palpitations, peripheral edema and abdominal pain. No sick contacts. No recent travel. She ambulates with a cane. Patient has a history of spasmodic dysphonia which she was previously getting botox shots for but had an episode of aspiration so she stopped the injections.    In ED, patient was hypotensive with BP of 88/53, improved to 106/60. Remainder of vitals were within normal range. CBC  unremarkable, BMP significant for hyponatremia. ProBNP was 25,370. RVP negative. CT chest ordered and pending. Preliminary read of EKG revealed afib with ventricular rate of 85. CXR showed small right pleural effusion, questionable infiltrate. CT chest revealed small right greater than left pleural effusions. Scattered patchy right upper lobe pulmonary infiltrates new from the prior study. Emphysema. Stable peripherally calcified lesion in the spleen. Patient given Rocephin and zithromax in ED.     INTERVAL HPI/OVERNIGHT EVENTS: Patient seen and examined at bedside. Patient says she feels better inn terms of the shortness of breath.    MEDICATIONS  (STANDING):  apixaban 2.5 milliGRAM(s) Oral every 12 hours  azithromycin  IVPB 500 milliGRAM(s) IV Intermittent every 24 hours  cefTRIAXone   IVPB 1 Gram(s) IV Intermittent every 24 hours  furosemide   Injectable 20 milliGRAM(s) IV Push two times a day  metoprolol succinate ER 50 milliGRAM(s) Oral daily  pantoprazole    Tablet 40 milliGRAM(s) Oral before breakfast    MEDICATIONS  (PRN):  ALBUTerol    90 MICROgram(s) HFA Inhaler 2 Puff(s) Inhalation every 6 hours PRN Bronchospasm    Allergies    No Known Allergies    Intolerances    ONSTITUTIONAL: +weakness, no fevers or chills  EYES/ENT: +regurgitation, No visual changes;  No vertigo or throat pain   NECK: + pain or stiffness  RESPIRATORY: No cough, wheezing, hemoptysis; No shortness of breath  CARDIOVASCULAR: No chest pain or palpitations  GASTROINTESTINAL: No abdominal pain. No nausea, vomiting, or hematemesis; No diarrhea or constipation. No melena or hematochezia.  GENITOURINARY: No dysuria, frequency or hematuria  NEUROLOGICAL: No numbness or weakness  MSK: Back pain  SKIN: No itching or rash      Vital Signs Last 24 Hrs  T(C): 36.6 (19 Apr 2018 15:18), Max: 36.7 (18 Apr 2018 18:45)  T(F): 97.8 (19 Apr 2018 15:18), Max: 98 (18 Apr 2018 18:45)  HR: 102 (19 Apr 2018 15:18) (86 - 103)  BP: 121/74 (19 Apr 2018 11:45) (83/50 - 121/74)  RR: 17 (19 Apr 2018 15:18) (16 - 18)  SpO2: 95% (19 Apr 2018 15:18) (92% - 100%)    04-18 @ 07:01 - 04-19 @ 07:00  --------------------------------------------------------  IN: 240 mL / OUT: 450 mL / NET: -210 mL    04-19 @ 07:01 - 04-19 @ 15:44  --------------------------------------------------------  IN: 220 mL / OUT: 150 mL / NET: 70 mL    Physical Exam  Constitutional: Elderly, frail, cachectic female in NAD  HEENT: Normocephalic, Atraumatic, moist mucous membranes  Pulmonary: Decreased breath sounds at base of lungs, No wheezing, rales or rhonchi  Cardiovascular: irregularly irregular.  S1 and S2 positive.    Gastrointestinal: soft, nontender, nondistended, no guarding, no rebound  : No CVA tenderness  Lymph: No peripheral edema. No cervical lymphadenopathy.  Neurological: Alert, follows commands, intact motor and sensation  MSK: NO joint edema or erythema, Kyphosis noted in T spine  Skin: No rashes or ulcers   Psych:  Mood & affect appropriate.      LABS:                        10.3   7.9   )-----------( 323      ( 19 Apr 2018 05:47 )             30.4     04-19    132<L>  |  96  |  17  ----------------------------<  94  3.1<L>   |  27  |  0.81    Ca    8.0<L>      19 Apr 2018 05:47    TPro  6.6  /  Alb  3.2<L>  /  TBili  0.7  /  DBili  x   /  AST  19  /  ALT  12  /  AlkPhos  108  04-18    PT/INR - ( 18 Apr 2018 13:37 )   PT: 15.7 sec;   INR: 1.43 ratio         PTT - ( 18 Apr 2018 13:37 )  PTT:34.4 sec 83F w/pmh of COPD not on home 02, HTN, HLD, Afib on Eliquis, CHF, multiple compression fractures and ascending penetrating aortic ulcer presenting with sob for 3-4 days. States that she has been having increasing sob for the past few days. States that she also has nausea and regurgitated clear fluids around 3-4 days ago. Pt has felt increasing weakness and lethargy with decreased appetite as well. She has tried nebulizers and inhalers for her sob without relief of her symptoms. Patient is unable to lie down flat due to compression fractures and difficulty breathing. Denies headache, fever, chills, vomiting, cough, chest pain, palpitations, peripheral edema and abdominal pain. No sick contacts. No recent travel. She ambulates with a cane. Patient has a history of spasmodic dysphonia which she was previously getting botox shots for but had an episode of aspiration so she stopped the injections.    In ED, patient was hypotensive with BP of 88/53, improved to 106/60. Remainder of vitals were within normal range. CBC  unremarkable, BMP significant for hyponatremia. ProBNP was 25,370. RVP negative. CT chest ordered and pending. Preliminary read of EKG revealed afib with ventricular rate of 85. CXR showed small right pleural effusion, questionable infiltrate. CT chest revealed small right greater than left pleural effusions. Scattered patchy right upper lobe pulmonary infiltrates new from the prior study. Emphysema. Stable peripherally calcified lesion in the spleen. Patient given Rocephin and zithromax in ED.     INTERVAL HPI/OVERNIGHT EVENTS: Patient seen and examined at bedside. Patient says she feels better in terms of the shortness of breath. Admits that she has never had similar symptoms. denies fever/chills. denies productive coughing. denies other complaints at this time.     MEDICATIONS  (STANDING):  apixaban 2.5 milliGRAM(s) Oral every 12 hours  azithromycin  IVPB 500 milliGRAM(s) IV Intermittent every 24 hours  cefTRIAXone   IVPB 1 Gram(s) IV Intermittent every 24 hours  furosemide   Injectable 20 milliGRAM(s) IV Push two times a day  metoprolol succinate ER 50 milliGRAM(s) Oral daily  pantoprazole    Tablet 40 milliGRAM(s) Oral before breakfast    MEDICATIONS  (PRN):  ALBUTerol    90 MICROgram(s) HFA Inhaler 2 Puff(s) Inhalation every 6 hours PRN Bronchospasm    Allergies    No Known Allergies    Intolerances    ONSTITUTIONAL: admits generalized weakness which is improving since time of admission, no fevers or chills  EYES/ENT: No visual changes;  No vertigo or throat pain   NECK: admits neck discomfort, denies stiffness  RESPIRATORY: No cough, wheezing, hemoptysis; No shortness of breath  CARDIOVASCULAR: No chest pain or palpitations  GASTROINTESTINAL: No abdominal pain. No nausea, vomiting, or hematemesis; No diarrhea or constipation. No melena or hematochezia.  GENITOURINARY: No dysuria, frequency or hematuria  NEUROLOGICAL: No numbness or weakness  MSK: Back pain  SKIN: No itching or rash      Vital Signs Last 24 Hrs  T(C): 36.6 (19 Apr 2018 15:18), Max: 36.7 (18 Apr 2018 18:45)  T(F): 97.8 (19 Apr 2018 15:18), Max: 98 (18 Apr 2018 18:45)  HR: 102 (19 Apr 2018 15:18) (86 - 103)  BP: 121/74 (19 Apr 2018 11:45) (83/50 - 121/74)  RR: 17 (19 Apr 2018 15:18) (16 - 18)  SpO2: 95% (19 Apr 2018 15:18) (92% - 100%)    04-18 @ 07:01 - 04-19 @ 07:00  --------------------------------------------------------  IN: 240 mL / OUT: 450 mL / NET: -210 mL    04-19 @ 07:01 - 04-19 @ 15:44  --------------------------------------------------------  IN: 220 mL / OUT: 150 mL / NET: 70 mL    Physical Exam  Constitutional: Elderly, frail female in NAD  HEENT: Normocephalic, Atraumatic, moist mucous membranes  Pulmonary: Decreased breath sounds at base of lungs, No wheezing, rales or rhonchi  Cardiovascular: irregularly irregular.  S1 and S2 positive.    Gastrointestinal: soft, nontender, nondistended, no guarding, no rebound  : No CVA tenderness  Lymph: No peripheral edema. No cervical lymphadenopathy.  Neurological: Alert, follows commands, intact motor and sensation  MSK: NO joint edema or erythema, Kyphosis noted in T spine  Skin: No rashes or ulcers   Psych:  Mood & affect appropriate.      LABS:                        10.3   7.9   )-----------( 323      ( 19 Apr 2018 05:47 )             30.4     04-19    132<L>  |  96  |  17  ----------------------------<  94  3.1<L>   |  27  |  0.81    Ca    8.0<L>      19 Apr 2018 05:47    TPro  6.6  /  Alb  3.2<L>  /  TBili  0.7  /  DBili  x   /  AST  19  /  ALT  12  /  AlkPhos  108  04-18    PT/INR - ( 18 Apr 2018 13:37 )   PT: 15.7 sec;   INR: 1.43 ratio         PTT - ( 18 Apr 2018 13:37 )  PTT:34.4 sec

## 2018-04-19 NOTE — PROGRESS NOTE ADULT - ASSESSMENT
83F w/pmh of COPD not on home 02, HTN, HLD, Afib on Eliquis, CHF, multiple compression fractures and ascending penetrating aortic ulcer presenting with sob for 3-4 days, admitted for CHF exacerbation.

## 2018-04-19 NOTE — PROGRESS NOTE ADULT - PROBLEM SELECTOR PLAN 7
Normally takes metoprolol  daily, will decrease to 50 daily with low BP Continue with ProAIR, Stiolto  Supplemental oxygen

## 2018-04-19 NOTE — PROGRESS NOTE ADULT - PROBLEM SELECTOR PLAN 2
-Community Acquired  -CT revealed small right greater than left pleural effusions. Scattered patchy right upper lobe pulmonary infiltrates new from the prior study.  -Received dose of Rocephin and zithromax  -Continue with abx regimen  -Monitor O2 sat  -Order procalcitonin -CT revealed small right greater than left pleural effusions. Scattered patchy right upper lobe pulmonary infiltrates new from the prior study.  -Received dose of Rocephin and zithromax  -Patient is afebrile, asymptomatic, no leukocytosis. Clinical picture does not align with pneumonia. Will discontinue abx  -Monitor O2 sat -CT revealed small right greater than left pleural effusions. Scattered patchy right upper lobe pulmonary infiltrates new from the prior study.  -on ctx/zithromax  -Patient is afebrile, nontoxic appearing, no leukocytosis, will check procalcitonin, monitor for fevers  -Monitor O2 sat

## 2018-04-19 NOTE — PROGRESS NOTE ADULT - SUBJECTIVE AND OBJECTIVE BOX
[facility] - Daniella Kenyon, Nola, Ghanshyam, Leon, Jhonatna [place]froy  Office Number:  [phone]    Patient resting comfortably in bed in NAD.  Laying flat with no respiratory distress.  No complaints of chest pain, dyspnea, palpitations, PND, or orthopnea.  Says she is finally feeling better.     ROS: negative unless otherwise mentioned.    Telemetry: AF better rate controlled     MEDICATIONS  (STANDING):  apixaban 2.5 milliGRAM(s) Oral every 12 hours  azithromycin  IVPB 500 milliGRAM(s) IV Intermittent every 24 hours  cefTRIAXone   IVPB 1 Gram(s) IV Intermittent every 24 hours  furosemide   Injectable 20 milliGRAM(s) IV Push two times a day  metoprolol succinate ER 50 milliGRAM(s) Oral daily  pantoprazole    Tablet 40 milliGRAM(s) Oral before breakfast  potassium chloride    Tablet ER 40 milliEquivalent(s) Oral every 4 hours    MEDICATIONS  (PRN):  ALBUTerol    90 MICROgram(s) HFA Inhaler 2 Puff(s) Inhalation every 6 hours PRN Bronchospasm      Allergies    No Known Allergies    Intolerances        Vital Signs Last 24 Hrs  T(C): 36.5 (19 Apr 2018 07:31), Max: 36.8 (18 Apr 2018 15:40)  T(F): 97.7 (19 Apr 2018 07:31), Max: 98.2 (18 Apr 2018 15:40)  HR: 95 (19 Apr 2018 07:31) (86 - 103)  BP: 96/55 (19 Apr 2018 07:31) (83/50 - 119/63)  BP(mean): --  RR: 18 (19 Apr 2018 07:31) (16 - 28)  SpO2: 92% (19 Apr 2018 07:31) (92% - 100%)    I&O's Summary    18 Apr 2018 07:01  -  19 Apr 2018 07:00  --------------------------------------------------------  IN: 240 mL / OUT: 450 mL / NET: -210 mL        ON EXAM:    Constitutional: NAD, alert and oriented x 3  Lungs: +coarse breath sounds, +crackles RUL and JENNIFER, No wheezing, rales or rhonchi  Cardiovascular: irregularly irregular.  S1 and S2 positive.  No murmurs, rubs, gallops or clicks  Gastrointestinal: soft, nontender, nondistended, no guarding, no rebound  Lymph: No peripheral edema. No cervical lymphadenopathy.  Neurological: Alert, no focal deficits  Skin: No rashes or ulcers   Psych:  Mood & affect appropriate.    LABS: All Labs Reviewed:                        10.3   7.9   )-----------( 323      ( 19 Apr 2018 05:47 )             30.4                         12.0   9.8   )-----------( 420      ( 18 Apr 2018 13:37 )             34.8     19 Apr 2018 05:47    132    |  96     |  17     ----------------------------<  94     3.1     |  27     |  0.81   18 Apr 2018 13:37    130    |  92     |  22     ----------------------------<  148    3.7     |  28     |  1.00     Ca    8.0        19 Apr 2018 05:47  Ca    8.9        18 Apr 2018 13:37    TPro  6.6    /  Alb  3.2    /  TBili  0.7    /  DBili  x      /  AST  19     /  ALT  12     /  AlkPhos  108    18 Apr 2018 13:37    PT/INR - ( 18 Apr 2018 13:37 )   PT: 15.7 sec;   INR: 1.43 ratio         PTT - ( 18 Apr 2018 13:37 )  PTT:34.4 sec  CARDIAC MARKERS ( 18 Apr 2018 13:37 )  <.015 ng/mL / x     / 24 U/L / x     / 1.2 ng/mL      Blood Culture:   04-18 @ 13:37  Pro Bnp [id number]

## 2018-04-20 LAB
ANION GAP SERPL CALC-SCNC: 7 MMOL/L — SIGNIFICANT CHANGE UP (ref 5–17)
BUN SERPL-MCNC: 17 MG/DL — SIGNIFICANT CHANGE UP (ref 7–23)
CALCIUM SERPL-MCNC: 8.4 MG/DL — LOW (ref 8.5–10.1)
CHLORIDE SERPL-SCNC: 99 MMOL/L — SIGNIFICANT CHANGE UP (ref 96–108)
CO2 SERPL-SCNC: 30 MMOL/L — SIGNIFICANT CHANGE UP (ref 22–31)
CREAT SERPL-MCNC: 1.1 MG/DL — SIGNIFICANT CHANGE UP (ref 0.5–1.3)
FERRITIN SERPL-MCNC: 242 NG/ML — HIGH (ref 15–150)
FOLATE SERPL-MCNC: 8 NG/ML — SIGNIFICANT CHANGE UP
GLUCOSE SERPL-MCNC: 92 MG/DL — SIGNIFICANT CHANGE UP (ref 70–99)
HCT VFR BLD CALC: 32.3 % — LOW (ref 34.5–45)
HGB BLD-MCNC: 10.9 G/DL — LOW (ref 11.5–15.5)
IRON SATN MFR SERPL: 22 % — SIGNIFICANT CHANGE UP (ref 14–50)
IRON SATN MFR SERPL: 66 UG/DL — SIGNIFICANT CHANGE UP (ref 30–160)
MCHC RBC-ENTMCNC: 33.8 GM/DL — SIGNIFICANT CHANGE UP (ref 32–36)
MCHC RBC-ENTMCNC: 34.6 PG — HIGH (ref 27–34)
MCV RBC AUTO: 102.3 FL — HIGH (ref 80–100)
PLATELET # BLD AUTO: 358 K/UL — SIGNIFICANT CHANGE UP (ref 150–400)
POTASSIUM SERPL-MCNC: 4.7 MMOL/L — SIGNIFICANT CHANGE UP (ref 3.5–5.3)
POTASSIUM SERPL-SCNC: 4.7 MMOL/L — SIGNIFICANT CHANGE UP (ref 3.5–5.3)
PROCALCITONIN SERPL-MCNC: 0.16 NG/ML — HIGH (ref 0–0.04)
RBC # BLD: 3.16 M/UL — LOW (ref 3.8–5.2)
RBC # FLD: 15.8 % — HIGH (ref 10.3–14.5)
SODIUM SERPL-SCNC: 136 MMOL/L — SIGNIFICANT CHANGE UP (ref 135–145)
TIBC SERPL-MCNC: 305 UG/DL — SIGNIFICANT CHANGE UP (ref 220–430)
UIBC SERPL-MCNC: 239 UG/DL — SIGNIFICANT CHANGE UP (ref 110–370)
VIT B12 SERPL-MCNC: 845 PG/ML — SIGNIFICANT CHANGE UP (ref 232–1245)
WBC # BLD: 8.4 K/UL — SIGNIFICANT CHANGE UP (ref 3.8–10.5)
WBC # FLD AUTO: 8.4 K/UL — SIGNIFICANT CHANGE UP (ref 3.8–10.5)

## 2018-04-20 PROCEDURE — 99233 SBSQ HOSP IP/OBS HIGH 50: CPT | Mod: GC

## 2018-04-20 PROCEDURE — 71045 X-RAY EXAM CHEST 1 VIEW: CPT | Mod: 26

## 2018-04-20 PROCEDURE — 99232 SBSQ HOSP IP/OBS MODERATE 35: CPT

## 2018-04-20 RX ORDER — FUROSEMIDE 40 MG
20 TABLET ORAL
Qty: 0 | Refills: 0 | Status: DISCONTINUED | OUTPATIENT
Start: 2018-04-20 | End: 2018-04-22

## 2018-04-20 RX ORDER — ALPRAZOLAM 0.25 MG
0.25 TABLET ORAL ONCE
Qty: 0 | Refills: 0 | Status: DISCONTINUED | OUTPATIENT
Start: 2018-04-20 | End: 2018-04-20

## 2018-04-20 RX ORDER — FUROSEMIDE 40 MG
20 TABLET ORAL
Qty: 0 | Refills: 0 | Status: DISCONTINUED | OUTPATIENT
Start: 2018-04-20 | End: 2018-04-20

## 2018-04-20 RX ADMIN — PANTOPRAZOLE SODIUM 40 MILLIGRAM(S): 20 TABLET, DELAYED RELEASE ORAL at 05:28

## 2018-04-20 RX ADMIN — Medication 200 MILLIGRAM(S): at 16:19

## 2018-04-20 RX ADMIN — Medication 20 MILLIGRAM(S): at 17:23

## 2018-04-20 RX ADMIN — ALBUTEROL 2 PUFF(S): 90 AEROSOL, METERED ORAL at 22:07

## 2018-04-20 RX ADMIN — APIXABAN 2.5 MILLIGRAM(S): 2.5 TABLET, FILM COATED ORAL at 17:23

## 2018-04-20 RX ADMIN — ALBUTEROL 2 PUFF(S): 90 AEROSOL, METERED ORAL at 17:39

## 2018-04-20 RX ADMIN — APIXABAN 2.5 MILLIGRAM(S): 2.5 TABLET, FILM COATED ORAL at 05:28

## 2018-04-20 RX ADMIN — Medication 0.25 MILLIGRAM(S): at 23:50

## 2018-04-20 RX ADMIN — Medication 20 MILLIGRAM(S): at 05:28

## 2018-04-20 RX ADMIN — Medication 50 MILLIGRAM(S): at 05:28

## 2018-04-20 NOTE — DISCHARGE NOTE ADULT - PATIENT PORTAL LINK FT
You can access the [facility], offered by [facility], by registering with the following website: [email or web address]

## 2018-04-20 NOTE — CHART NOTE - NSCHARTNOTEFT_GEN_A_CORE
RN called for Pt with dyspnea. Patient admitted for CHF exacerbation. Pt examined at bedside. Pt c/o worsening dyspnea. VSS, Spo2 96% on 2L via nasal canula. PE demonstrated no fluid overload, lungs clear. Nursing instructed to give albuterol treatment early. Will f/u CXR.       Vital Signs - Last 24 Hrs    T(C): 36.4 (18 @ 21:38), Max: 36.8 (18 @ 00:04)  HR: 85 (18 @ 21:38) (85 - 135)  BP: 107/68 (18 @ 21:38) (94/69 - 128/83)  RR: 16 (18 @ 21:38) (16 - 17)  SpO2: 96% (18 @ 21:38) (82% - 100%)  Wt(kg): --  Daily     Daily Weight in k.2 (2018 14:05)    I&O's Summary    2018 07:  -  2018 07:00  --------------------------------------------------------  IN: 860 mL / OUT: 1050 mL / NET: -190 mL    2018 07:01  -  2018 22:04  --------------------------------------------------------  IN: 540 mL / OUT: 1275 mL / NET: -735 mL        Physical Exam  Constitutional: Elderly, frail female in NAD  HEENT: Normocephalic, Atraumatic, moist mucous membranes  Pulmonary: Decreased breath sounds at base of lungs, No wheezing, rales or rhonchi, speaking in full sentences, no use of accessory muscles  Cardiovascular: irregularly irregular.  S1 and S2 positive.    Gastrointestinal: soft, nontender, nondistended, no guarding, no rebound  : No CVA tenderness  Lymph: No peripheral edema. No cervical lymphadenopathy.  Neurological: Alert, follows commands, intact motor and sensation  MSK: No joint edema or erythema, Kyphosis noted in T spine  Skin: No rashes or ulcers         A/P    83F w/pmh of COPD not on home 02, HTN, HLD, Afib on Eliquis, CHF, multiple compression fractures and ascending penetrating aortic ulcer presenting with sob for 3-4 days, admitted for CHF exacerbation.    - c/w supplemental O2, titrate SpO2 >92%  - c/w albuterol treatment  - f/u cxr   - will monitor RN called for Pt with dyspnea. Patient admitted for CHF exacerbation. Pt examined at bedside. Pt c/o worsening dyspnea. VSS, Spo2 96% on 2L via nasal canula. PE demonstrated no fluid overload, lungs clear. Nursing instructed to give albuterol treatment early. Will f/u CXR.     ADDENDUM: Nursing called repeatedly throughout the night for patient complaint of dyspnea. Vitals throughout the night were stable, with SpO2 >95% on 2L via nasal canula. Patient was examined several times by service intern and resident. CXR was followed up on with [facility] radiology, who stated no acute processes were evident. ABG did not show significant CO2 retention. Troponins were ordered in the context of CP. Troponin was found to be negative. Patient did night have relief to ativan, famotidine or maalox. Will order BiPAP for patient.     Vital Signs - Last 24 Hrs    T(C): 36.4 (18 @ 21:38), Max: 36.8 (18 @ 00:04)  HR: 85 (18 @ 21:38) (85 - 135)  BP: 107/68 (-18 @ 21:38) (94/69 - 128/83)  RR: 16 (18 @ 21:38) (16 - 17)  SpO2: 96% (18 @ 21:38) (82% - 100%)  Wt(kg): --  Daily     Daily Weight in k.2 (2018 14:05)    I&O's Summary    2018 07:  -  2018 07:00  --------------------------------------------------------  IN: 860 mL / OUT: 1050 mL / NET: -190 mL    2018 07:  -  2018 22:04  --------------------------------------------------------  IN: 540 mL / OUT: 1275 mL / NET: -735 mL        Physical Exam  Constitutional: Elderly, frail female in NAD  HEENT: Normocephalic, Atraumatic, moist mucous membranes  Pulmonary: Decreased breath sounds at base of lungs, No wheezing, rales or rhonchi, speaking in full sentences, no use of accessory muscles  Cardiovascular: irregularly irregular.  S1 and S2 positive.    Gastrointestinal: soft, nontender, nondistended, no guarding, no rebound  : No CVA tenderness  Lymph: No peripheral edema. No cervical lymphadenopathy.  Neurological: Alert, follows commands, intact motor and sensation  MSK: No joint edema or erythema, Kyphosis noted in T spine  Skin: No rashes or ulcers         A/P    83F w/pmh of COPD not on home 02, HTN, HLD, Afib on Eliquis, CHF, multiple compression fractures and ascending penetrating aortic ulcer presenting with sob for 3-4 days, admitted for CHF exacerbation.    - c/w supplemental O2, titrate SpO2 >92%  - c/w albuterol treatment  - f/u cxr   - will monitor

## 2018-04-20 NOTE — PROGRESS NOTE ADULT - SUBJECTIVE AND OBJECTIVE BOX
Follow up: SOB.TN    HPI:  83F w/pmh of COPD not on home 02, HTN, HLD, Afib on Eliquis, CHF, multiple compression fractures and ascending penetrating aortic ulcer presenting with sob for 3-4 days. States that she has been having increasing sob for the past few days. States that she also has nausea and regurgitated clear fluids around 3-4 days ago. Pt has felt increasing weakness and lethargy with decreased appetite as well. She has tried nebulizers and inhalers for her sob without relief of her symptoms. Patient is unable to lie down flat due to compression fractures and difficulty breathing. Denies headache, fever, chills, vomiting, cough, chest pain, palpitations, peripheral edema and abdominal pain. No sick contacts. No recent travel. She ambulates with a cane. Patient has a history of spasmodic dysphonia which she was previously getting botox shots for but had an episode of aspiration so she stopped the injections.    In ED, patient was hypotensive with BP of 88/53, improved to 106/60. Remainder of vitals were within normal range. CBC  unremarkable, BMP significant for hyponatremia. ProBNP was 25,370. RVP negative. CT chest ordered and pending. Preliminary read of EKG revealed afib with ventricular rate of 85. CXR showed small right pleural effusion, questionable infiltrate. CT chest revealed small right greater than left pleural effusions. Scattered patchy right upper lobe pulmonary infiltrates new from the prior study. Emphysema. Stable peripherally calcified lesion in the spleen. Patient given Rocephin and zithromax in ED. (2018 17:33)    This morning she is awake and alert with no specific complaints. She reports no chest pain or increasing dyspnea.      PAST MEDICAL & SURGICAL HISTORY:  CHF (congestive heart failure)  Compression fracture: Aug 2015  Carbon monoxide poisonin  Pulmonary emphysema, unspecified emphysema type  Secondary hypertension  Atrial fibrillation, unspecified type  History of arthroscopy of knee  History of appendectomy:   History of right shoulder replacement:       MEDICATIONS  (STANDING):  apixaban 2.5 milliGRAM(s) Oral every 12 hours  furosemide   Injectable 20 milliGRAM(s) IV Push two times a day  metoprolol succinate ER 50 milliGRAM(s) Oral daily  pantoprazole    Tablet 40 milliGRAM(s) Oral before breakfast    MEDICATIONS  (PRN):  ALBUTerol    90 MICROgram(s) HFA Inhaler 2 Puff(s) Inhalation every 6 hours PRN Bronchospasm      Vital Signs Last 24 Hrs  T(C): 36.8 (2018 12:00), Max: 36.8 (2018 19:31)  T(F): 98.2 (2018 12:00), Max: 98.2 (2018 19:31)  HR: 108 (2018 12:00) (102 - 135)  BP: 124/64 (2018 12:00) (92/67 - 124/64)  BP(mean): --  RR: 16 (2018 12:00) (16 - 18)  SpO2: 100% (2018 12:00) (82% - 100%)    I&O's Summary    2018 07:01  -  2018 07:00  --------------------------------------------------------  IN: 860 mL / OUT: 1050 mL / NET: -190 mL        PHYSICAL EXAM:    Constitutional: NAD, awake and alert, well-developed  Eyes:  Pupils round, no lesions  ENMT: no exudate or erythema  Pulmonary: scattered rhonchi bilat  Cardiovascular: PMI not palpable tachy irreg normal S1 and S2, no murmurs, rubs, gallops or clicks  Gastrointestinal: Bowel Sounds present, soft, nontender.   Lymph: No cervical lymphadenopathy.  Neurological: Alert, no focal deficits  Skin: No rashes.  No cyanosis.  Psych:  Mood & affect appropriate   Ext: No lower ext edema      CARDIAC MARKERS ( 2018 13:37 )  <.015 ng/mL / x     / 24 U/L / x     / 1.2 ng/mL                            10.9   8.4   )-----------( 358      ( 2018 07:12 )             32.3     04-20    136  |  99  |  17  ----------------------------<  92  4.7   |  30  |  1.10    Ca    8.4<L>      2018 07:12    TPro  6.6  /  Alb  3.2<L>  /  TBili  0.7  /  DBili  x   /  AST  19  /  ALT  12  /  AlkPhos  108  -18    < from: 12 Lead ECG (18 @ 13:03) >    Ventricular Rate 106 BPM    Atrial Rate 131 BPM    QRS Duration 76 ms    Q-T Interval 354 ms    QTC Calculation(Bezet) 470 ms    R Axis -3 degrees    T Axis -50 degrees    Diagnosis Line Atrial fibrillation with rapid ventricular response  Septal infarct , age undetermined  Nonspecific ST abnormality  Abnormal ECG      Confirmed by Hung Ruiz ([id number]) on 2018 11:12:32 AM    < end of copied text >    < from: CT Chest No Cont (18 @ 18:01) >    EXAM:  CT CHEST                            PROCEDURE DATE:  2018          INTERPRETATION:  CLINICAL INFORMATION:  Shortness of breath    PROCEDURE:  Using multislice helical CT, thin sections were obtained from   the thoracic inlet through the lung bases.    COMPARISON: Chest x-ray of earlier in the day and CT chest of 2018    FINDINGS:      There is no significant axillary, hilar, or mediastinal lymphadenopathy.   There is no pericardial effusion. There are small free-flowing pleural   effusions right greater than left There is atherosclerotic calcification   of the aorta and tracheobronchial calcification. There is coronary artery   calcification.    There is emphysema. There is bibasilar atelectasis. There are patchy   areas of infiltrate in the right upper lobe.    The osseous structures demonstrate osteopenia and degenerative change.    The upper abdomen is remarkable for extensive vascular calcifications   with rounded peripherally calcified lesion again identified in the spleen   there are probable small gallstones in the gallbladder.    IMPRESSION: Small right greater than left pleural effusions  Scattered patchy right upper lobe pulmonary infiltrates new from the   prior study  Emphysema  Stable peripherally calcified lesion in the spleen                          BERTO GARCIA M.D.,ATTENDING RADIOLOGIST  This document has been electronically signed. 2018  6:24PM                < end of copied text >

## 2018-04-20 NOTE — PROGRESS NOTE ADULT - ASSESSMENT
83F w/pmh of COPD not on home 02, HTN, HLD, Afib on Eliquis, CHF, multiple compression fractures and ascending penetrating aortic ulcer presenting with sob.    Likely multifactorial 2/2 CHF exacerbation vs. COPD vs. aspiration pneumonia   No clear evidence of acute ischemia,  RAPID af THIS AM    - cont metop  -add dilt [id number]  - ECHO 3/2016 showed moderate ischemic myopathy, moderate-severe MR, severe dilated left atrium, normal LV, EF 67%. Repeat ECHO here.   - ProBNP elevated. Continue Lasix 20mg BID IV.   - Pt has hx of chronic afib on Eliquis. CHADsVASc score 5. Continue Eliquis 2.5 mg BID (with normal renal function, she meets criteria for 5 bid, though continue 2.5 for now given her drop in Hb). Watch Hb  - CT chest with small bilateral effusions  - PE is also on the differential given her tachycardia and hypoxia.  - Monitor and replete lytes, keep K>4, Mg>2  - Other cardiovascular workup will depend on clinical course.  - Continue to monitor telemetry  - All other workup per primary team  - Will follow

## 2018-04-20 NOTE — DISCHARGE NOTE ADULT - PLAN OF CARE
Resolution of symptoms -Please follow up with Dr. Gillis within 1 week of discharge -Please follow up with PCP within 1 week of discharge  -Please continue with Losartan, take metoprolol as prescribed Continue with  Proair and Repimat Please follow up PCP within 1 week of discharge Continue Eliquis and metoprolol as prescribed Please continue taking Praluent injections as before

## 2018-04-20 NOTE — PROGRESS NOTE ADULT - PROBLEM SELECTOR PLAN 4
Hgb dropped from 12 to 10.3, now 10.9  No evidence of bleed  normal iron panel, pending b12 and folate  Monitor Hgb dropped from 12 to 10.3, now 10.9  No evidence of bleed  normal iron panel, pending b12 and folate  Monitor h/h daily

## 2018-04-20 NOTE — DISCHARGE NOTE ADULT - CARE PROVIDERS DIRECT ADDRESSES
[email or web address]

## 2018-04-20 NOTE — PROGRESS NOTE ADULT - PROBLEM SELECTOR PLAN 1
-Continue Lasix 20mg IV BID for today as patient continues to be short of breath, hold on switching to PO.   -Patient will need to be evaluated for home O2  -Will hold Losartan as patient's BP has been borderline. Will discuss with cardio possibility of decreasing metoprolol dose to restart on Losartan  -Echo pending  -Continue fluid restriction  -I and Os  -on beta blocker -Continue Lasix 20mg IV BID for today as patient continues to be short of breath, hold on switching to PO.   -Patient will need to be evaluated for home O2 if symptoms don't improve but this is likely due to increased pulm vascular congestion. doubt due to infection  -Will hold Losartan as patient's BP has been borderline. Will discuss with cardio possibility of decreasing metoprolol dose to restart on Losartan  -Echo pending  -Continue fluid restriction  -I and Os  -on beta blocker

## 2018-04-20 NOTE — DISCHARGE NOTE ADULT - SECONDARY DIAGNOSIS.
Atrial fibrillation, unspecified type Hyponatremia COPD without exacerbation DENISE (acute kidney injury) Urinary retention HLD (hyperlipidemia)

## 2018-04-20 NOTE — PROGRESS NOTE ADULT - PROBLEM SELECTOR PLAN 6
Continue metoprolol 50mg daily with low BP  [id number] score of 5, continue anticoagulation with Eliquis 2.5 BID Continue metoprolol 50mg daily with low BP  [id number] score of 5, continue anticoagulation with Eliquis 2.5 BID  clearance today is slightly less than 30. weight is 40kg (<60kg), age >80.   spoke w/ clinical pharmacy, will continue current care (for now)

## 2018-04-20 NOTE — DISCHARGE NOTE ADULT - HOSPITAL COURSE
83F w/PMH of COPD not on home 02, HTN, HLD, Afib on Eliquis, CHF, multiple compression fractures and ascending penetrating aortic ulcer presented with sob for 3-4 days. Patient stated she had  been having increasing sob for the past few days leading up to admission. She also felt increasing weakness and lethargy with decreased appetite as well.  ProBNP was 25,370. RVP negative.  Patient admitted with CHF exacerbation. Patient was hypotensive in ED, but blood pressure was improved. Patient was started on IV lasix 20mg IV. CT chest revealed small right greater than left pleural effusions. Scattered patchy right upper lobe pulmonary infiltrates new from the prior study. Patient given Rocephin and zithromax in ED for suspected pneumonia, however clinically patient did not look infectious, abx were discontinued.   Patient was evaluated by Physical therapy and they recommended subacute rehab. Patient desaturated on room air while ambulating. Will be evaluated for home O2. 83F w/PMH of COPD not on home 02, HTN, HLD, Afib on Eliquis, CHF, multiple compression fractures and ascending penetrating aortic ulcer presented with sob for 3-4 days. Patient stated she had  been having increasing sob for the past few days leading up to admission. She also felt increasing weakness and lethargy with decreased appetite as well.  ProBNP was 25,370. RVP negative.  Patient admitted with CHF exacerbation. Patient was hypotensive in ED, but blood pressure was improved. Patient was started on IV lasix 20mg IV BID. CT chest revealed small right greater than left pleural effusions. Scattered patchy right upper lobe pulmonary infiltrates new from the prior study. Patient given Rocephin and zithromax in ED for suspected pneumonia, however clinically patient did not look infectious, abx were discontinued.   Patient was evaluated by Physical therapy and they recommended subacute rehab. Patient desaturated on room air while ambulating. Will be evaluated for home O2. 83F w/PMH of COPD not on home 02, HTN, HLD, Afib on Eliquis, CHF, multiple compression fractures and ascending penetrating aortic ulcer presented with sob for 3-4 days. Patient stated she had  been having increasing sob for the past few days leading up to admission. She also felt increasing weakness and lethargy with decreased appetite as well.  ProBNP was 25,370. RVP negative.  Patient admitted with CHF exacerbation. Patient was hypotensive in ED, but blood pressure was improved. Patient was started on IV lasix 20mg IV BID, follow by transition to PO. CT chest revealed small right greater than left pleural effusions. Scattered patchy right upper lobe pulmonary infiltrates new from the prior study. Patient given Rocephin and zithromax in ED for suspected pneumonia, however clinically patient did not look infectious, abx were discontinued.   Patient was evaluated by Physical therapy and they recommended subacute rehab. Patient desaturated on room air while ambulating. Patient should be evaluated for home oxygen.    On day of discharge, patient was stable for discharge to [facility]. 83F w/PMH of COPD not on home 02, HTN, HLD, Afib on Eliquis, CHF, multiple compression fractures and ascending penetrating aortic ulcer presented with sob for 3-4 days. Patient stated she had  been having increasing sob for the past few days leading up to admission. She also felt increasing weakness and lethargy with decreased appetite as well.  ProBNP was 25,370. RVP negative.  Patient admitted with CHF exacerbation. Patient was hypotensive in ED, but blood pressure was improved. Patient was started on IV lasix 20mg IV BID, follow by transition to PO. CT chest revealed small right greater than left pleural effusions. Scattered patchy right upper lobe pulmonary infiltrates new from the prior study. Patient given Rocephin and zithromax in ED for suspected pneumonia, however clinically patient did not look infectious, abx were discontinued.   Patient was evaluated by Physical therapy and they recommended subacute rehab. Patient desaturated on room air while ambulating. Patient should be evaluated for home oxygen. Rapid response was called because patient desaturated to 60s and was in afib with RVR at 150. Patient was started on BIPAP and improved with O2 sat in the 90s, given duoneb treatment and stat dose of cardizem 60. Patient's cardizem was held for three doses prior to RR, because of low BP. Cardio recommended possibly switching patient to digoxin if blood pressure not able to tolerate cardizem.     On day of discharge, patient was stable for discharge to [facility]. 83F w/PMH of COPD not on home 02, HTN, HLD, Afib on Eliquis, CHF, multiple compression fractures and ascending penetrating aortic ulcer presented with sob for 3-4 days. Patient stated she had  been having increasing sob for the past few days leading up to admission. She also felt increasing weakness and lethargy with decreased appetite as well.  ProBNP was 25,370. RVP negative.  Patient admitted with CHF exacerbation. Patient was hypotensive in ED, but blood pressure was improved. Patient was started on IV lasix 20mg IV BID, follow by transition to PO. CT chest revealed small right greater than left pleural effusions. Scattered patchy right upper lobe pulmonary infiltrates new from the prior study. Patient given Rocephin and zithromax in ED for suspected pneumonia, however clinically patient did not look infectious, abx were discontinued.   Patient was evaluated by Physical therapy and they recommended subacute rehab. Patient desaturated on room air while ambulating. Patient should be evaluated for home oxygen. Rapid response was called because patient desaturated to 60s and was in afib with RVR at 150. Patient was started on BIPAP and improved with O2 sat in the 90s, given duoneb treatment and stat dose of cardizem 60. Patient's cardizem was held for three doses prior to RR, because of low BP. Cardio recommended possibly switching patient to digoxin if blood pressure not able to tolerate cardizem.     Patient was also evaluated by Psych (Dr. Chery) for anxiety and was started on Remeron.     On day of discharge, patient was stable for discharge to [facility]. 83F w/PMH of COPD not on home 02, HTN, HLD, Afib on Eliquis, CHF, multiple compression fractures and ascending penetrating aortic ulcer presented with sob for 3-4 days. Patient stated she had  been having increasing sob for the past few days leading up to admission. She also felt increasing weakness and lethargy with decreased appetite as well.  ProBNP was 25,370. RVP negative.  Patient admitted with CHF exacerbation. Patient was hypotensive in ED, but blood pressure was improved. Patient was started on IV lasix 20mg IV BID, follow by transition to PO. CT chest revealed small right greater than left pleural effusions. Scattered patchy right upper lobe pulmonary infiltrates new from the prior study. Patient given Rocephin and zithromax in ED for suspected pneumonia, however clinically patient did not look infectious, abx were discontinued.   Patient was evaluated by Physical therapy and they recommended subacute rehab. Patient desaturated on room air while ambulating. Patient should be evaluated for home oxygen. Rapid response was called because patient desaturated to 60s and was in afib with RVR at 150. Patient was started on BIPAP and improved with O2 sat in the 90s, given duoneb treatment and stat dose of cardizem 60. Patient's cardizem was held for three doses prior to RR, because of low BP. Cardio recommended possibly switching patient to digoxin if blood pressure not able to tolerate cardizem. Patient was later transitioned to high flow oxygen.    Patient was also evaluated by Psych (Dr. Chery) for anxiety and was started on Remeron.     On day of discharge, patient was stable for discharge to [facility].

## 2018-04-20 NOTE — PROGRESS NOTE ADULT - PROBLEM SELECTOR PLAN 5
Patient has history of urinary retention for past 2 weeks  She was here on April 9th, CT stone hunt revealed bilateral nonobstructing nephrolithiasis, UA was negative. She's had no urinary symptoms  Patient now urinating without issues  If continues, consider a urology consult

## 2018-04-20 NOTE — PROGRESS NOTE ADULT - PROBLEM SELECTOR PLAN 2
-CT revealed small right greater than left pleural effusions. Scattered patchy right upper lobe pulmonary infiltrates new from the prior study.  -Clinically patient does not appear to have pneumonia Patient is afebrile, nontoxic appearing, no leukocytosis, mildly elevated procalcitonin. Discontinued abx  -Monitor O2 sat -CT revealed small right greater than left pleural effusions. Scattered patchy right upper lobe pulmonary infiltrates new from the prior study.  -Clinically patient does not appear to have pneumonia Patient is afebrile, nontoxic appearing, no leukocytosis, mildly elevated procalcitonin. Discontinued abx  -Monitor O2 sat  -will monitor clinical symptoms

## 2018-04-20 NOTE — DISCHARGE NOTE ADULT - CARE PLAN
Principal Discharge DX:	Acute systolic congestive heart failure  Goal:	Resolution of symptoms  Assessment and plan of treatment:	-Please follow up with Dr. Gillis within 1 week of discharge  Secondary Diagnosis:	Atrial fibrillation, unspecified type  Secondary Diagnosis:	Hyponatremia Principal Discharge DX:	Acute systolic congestive heart failure  Goal:	Resolution of symptoms  Assessment and plan of treatment:	-Please follow up with PCP within 1 week of discharge  -Please continue with Losartan, take metoprolol as prescribed  Secondary Diagnosis:	COPD without exacerbation  Assessment and plan of treatment:	Continue with  Proair and Repimat  Secondary Diagnosis:	DENISE (acute kidney injury)  Assessment and plan of treatment:	Please follow up PCP within 1 week of discharge  Secondary Diagnosis:	Hyponatremia  Assessment and plan of treatment:	Please follow up PCP within 1 week of discharge  Secondary Diagnosis:	Urinary retention  Assessment and plan of treatment:	Please follow up PCP within 1 week of discharge  Secondary Diagnosis:	Atrial fibrillation, unspecified type  Assessment and plan of treatment:	Continue Eliquis and metoprolol as prescribed  Secondary Diagnosis:	HLD (hyperlipidemia)  Assessment and plan of treatment:	Please continue taking Praluent injections as before

## 2018-04-20 NOTE — PROGRESS NOTE ADULT - SUBJECTIVE AND OBJECTIVE BOX
83F w/pmh of COPD not on home 02, HTN, HLD, Afib on Eliquis, CHF, multiple compression fractures and ascending penetrating aortic ulcer presenting with sob for 3-4 days. States that she has been having increasing sob for the past few days. States that she also has nausea and regurgitated clear fluids around 3-4 days ago. Pt has felt increasing weakness and lethargy with decreased appetite as well. She has tried nebulizers and inhalers for her sob without relief of her symptoms. Patient is unable to lie down flat due to compression fractures and difficulty breathing. Denies headache, fever, chills, vomiting, cough, chest pain, palpitations, peripheral edema and abdominal pain. No sick contacts. No recent travel. She ambulates with a cane. Patient has a history of spasmodic dysphonia which she was previously getting botox shots for but had an episode of aspiration so she stopped the injections.    In ED, patient was hypotensive with BP of 88/53, improved to 106/60. Remainder of vitals were within normal range. CBC  unremarkable, BMP significant for hyponatremia. ProBNP was 25,370. RVP negative. CT chest ordered and pending. Preliminary read of EKG revealed afib with ventricular rate of 85. CXR showed small right pleural effusion, questionable infiltrate. CT chest revealed small right greater than left pleural effusions. Scattered patchy right upper lobe pulmonary infiltrates new from the prior study. Emphysema. Stable peripherally calcified lesion in the spleen. Patient given Rocephin and zithromax in ED.     INTERVAL HPI/OVERNIGHT EVENTS: Patient seen and examined at bedside. Patient complained of shortness of breath this morning. Patient desaturated while ambulating on room air.     MEDICATIONS  (STANDING):  apixaban 2.5 milliGRAM(s) Oral every 12 hours  diltiazem    Tablet 60 milliGRAM(s) Oral every 6 hours  furosemide   Injectable 20 milliGRAM(s) IV Push two times a day  metoprolol succinate ER 50 milliGRAM(s) Oral daily  pantoprazole    Tablet 40 milliGRAM(s) Oral before breakfast    MEDICATIONS  (PRN):  ALBUTerol    90 MICROgram(s) HFA Inhaler 2 Puff(s) Inhalation every 6 hours PRN Bronchospasm    Allergies    No Known Allergies    Intolerances    CONSTITUTIONAL: admits generalized weakness which is improving since time of admission, no fevers or chills  EYES/ENT: No visual changes;  No vertigo or throat pain   NECK: admits neck discomfort, denies stiffness  RESPIRATORY: admits to shortness of breath this morning  CARDIOVASCULAR: No chest pain or palpitations  GASTROINTESTINAL: No abdominal pain. No nausea, vomiting, or hematemesis; No diarrhea or constipation. No melena or hematochezia.  GENITOURINARY: No dysuria, frequency or hematuria  NEUROLOGICAL: No numbness or weakness  MSK: Back pain  SKIN: No itching or rash    Vital Signs Last 24 Hrs  T(C): 36.8 (20 Apr 2018 12:00), Max: 36.8 (19 Apr 2018 19:31)  T(F): 98.2 (20 Apr 2018 12:00), Max: 98.2 (19 Apr 2018 19:31)  HR: 108 (20 Apr 2018 12:00) (102 - 135)  BP: 124/64 (20 Apr 2018 12:00) (92/67 - 124/64)  RR: 16 (20 Apr 2018 12:00) (16 - 18)  SpO2: 100% (20 Apr 2018 12:00) (82% - 100%)  04-18 @ 07:01  -  04-19 @ 07:00  --------------------------------------------------------    Physical Exam  Constitutional: Elderly, frail female in NAD  HEENT: Normocephalic, Atraumatic, moist mucous membranes  Pulmonary: Decreased breath sounds at base of lungs, No wheezing, rales or rhonchi, speaking in full sentences, no use of accessory muscles  Cardiovascular: irregularly irregular.  S1 and S2 positive.    Gastrointestinal: soft, nontender, nondistended, no guarding, no rebound  : No CVA tenderness  Lymph: No peripheral edema. No cervical lymphadenopathy.  Neurological: Alert, follows commands, intact motor and sensation  MSK: No joint edema or erythema, Kyphosis noted in T spine  Skin: No rashes or ulcers   Psych:  Mood & affect appropriate.      LABS:                                       10.9   8.4   )-----------( 358      ( 20 Apr 2018 07:12 )             32.3     04-20    136  |  99  |  17  ----------------------------<  92  4.7   |  30  |  1.10    Ca    8.4<L>      20 Apr 2018 07:12      TPro  6.6  /  Alb  3.2<L>  /  TBili  0.7  /  DBili  x   /  AST  19  /  ALT  12  /  AlkPhos  108  04-18    PT/INR - ( 18 Apr 2018 13:37 )   PT: 15.7 sec;   INR: 1.43 ratio         PTT - ( 18 Apr 2018 13:37 )  PTT:34.4 sec 83F w/pmh of COPD not on home 02, HTN, HLD, Afib on Eliquis, CHF, multiple compression fractures and ascending penetrating aortic ulcer presenting with sob for 3-4 days. States that she has been having increasing sob for the past few days. States that she also has nausea and regurgitated clear fluids around 3-4 days ago. Pt has felt increasing weakness and lethargy with decreased appetite as well. She has tried nebulizers and inhalers for her sob without relief of her symptoms. Patient is unable to lie down flat due to compression fractures and difficulty breathing. Denies headache, fever, chills, vomiting, cough, chest pain, palpitations, peripheral edema and abdominal pain. No sick contacts. No recent travel. She ambulates with a cane. Patient has a history of spasmodic dysphonia which she was previously getting botox shots for but had an episode of aspiration so she stopped the injections.    In ED, patient was hypotensive with BP of 88/53, improved to 106/60. Remainder of vitals were within normal range. CBC  unremarkable, BMP significant for hyponatremia. ProBNP was 25,370. RVP negative. CT chest ordered and pending. Preliminary read of EKG revealed afib with ventricular rate of 85. CXR showed small right pleural effusion, questionable infiltrate. CT chest revealed small right greater than left pleural effusions. Scattered patchy right upper lobe pulmonary infiltrates new from the prior study. Emphysema. Stable peripherally calcified lesion in the spleen. Patient given Rocephin and zithromax in ED.     INTERVAL HPI/OVERNIGHT EVENTS: Patient seen and examined at bedside. Patient complained of shortness of breath this morning. Patient desaturated while ambulating on room air. Admits cough and chest congestion.     MEDICATIONS  (STANDING):  apixaban 2.5 milliGRAM(s) Oral every 12 hours  diltiazem    Tablet 60 milliGRAM(s) Oral every 6 hours  furosemide   Injectable 20 milliGRAM(s) IV Push two times a day  metoprolol succinate ER 50 milliGRAM(s) Oral daily  pantoprazole    Tablet 40 milliGRAM(s) Oral before breakfast    MEDICATIONS  (PRN):  ALBUTerol    90 MICROgram(s) HFA Inhaler 2 Puff(s) Inhalation every 6 hours PRN Bronchospasm    Allergies    No Known Allergies    Intolerances    CONSTITUTIONAL: admits generalized weakness which is improving since time of admission, no fevers or chills  EYES/ENT: No visual changes;  No vertigo or throat pain   NECK: admits neck discomfort, denies stiffness  RESPIRATORY: admits to shortness of breath this morning  CARDIOVASCULAR: No chest pain or palpitations  GASTROINTESTINAL: No abdominal pain. No nausea, vomiting, or hematemesis; No diarrhea or constipation. No melena or hematochezia.  GENITOURINARY: No dysuria, frequency or hematuria  NEUROLOGICAL: No numbness or weakness  MSK: Back pain  SKIN: No itching or rash    Vital Signs Last 24 Hrs  T(C): 36.8 (20 Apr 2018 12:00), Max: 36.8 (19 Apr 2018 19:31)  T(F): 98.2 (20 Apr 2018 12:00), Max: 98.2 (19 Apr 2018 19:31)  HR: 108 (20 Apr 2018 12:00) (102 - 135)  BP: 124/64 (20 Apr 2018 12:00) (92/67 - 124/64)  RR: 16 (20 Apr 2018 12:00) (16 - 18)  SpO2: 100% (20 Apr 2018 12:00) (82% - 100%)  04-18 @ 07:01  -  04-19 @ 07:00  --------------------------------------------------------    Physical Exam  Constitutional: Elderly, frail female in NAD  HEENT: Normocephalic, Atraumatic, moist mucous membranes  Pulmonary: Decreased breath sounds at base of lungs, No wheezing, rales or rhonchi, speaking in full sentences, no use of accessory muscles  Cardiovascular: irregularly irregular.  S1 and S2 positive.    Gastrointestinal: soft, nontender, nondistended, no guarding, no rebound  : No CVA tenderness  Lymph: No peripheral edema. No cervical lymphadenopathy.  Neurological: Alert, follows commands, intact motor and sensation  MSK: No joint edema or erythema, Kyphosis noted in T spine  Skin: No rashes or ulcers         LABS:                                       10.9   8.4   )-----------( 358      ( 20 Apr 2018 07:12 )             32.3     04-20    136  |  99  |  17  ----------------------------<  92  4.7   |  30  |  1.10    Ca    8.4<L>      20 Apr 2018 07:12      TPro  6.6  /  Alb  3.2<L>  /  TBili  0.7  /  DBili  x   /  AST  19  /  ALT  12  /  AlkPhos  108  04-18    PT/INR - ( 18 Apr 2018 13:37 )   PT: 15.7 sec;   INR: 1.43 ratio         PTT - ( 18 Apr 2018 13:37 )  PTT:34.4 sec

## 2018-04-20 NOTE — DISCHARGE NOTE ADULT - CARE PROVIDER_API CALL
Lurdes Gillis (DO), Internal Medicine  [address] [place] [address]  Phone: [phone]  Fax: [phone] Lurdes Gillis (), Internal Medicine  [address] [place] [address]  Phone: [phone]  Fax: [phone]    Jr Israel), Internal Medicine  [address] [place] [address]  Phone: [phone]  Fax: [phone]

## 2018-04-21 DIAGNOSIS — R06.03 ACUTE RESPIRATORY DISTRESS: ICD-10-CM

## 2018-04-21 DIAGNOSIS — J44.9 CHRONIC OBSTRUCTIVE PULMONARY DISEASE, UNSPECIFIED: ICD-10-CM

## 2018-04-21 LAB
ANION GAP SERPL CALC-SCNC: 11 MMOL/L — SIGNIFICANT CHANGE UP (ref 5–17)
BASE EXCESS BLDA CALC-SCNC: 3.6 MMOL/L — HIGH (ref -2–2)
BLOOD GAS COMMENTS ARTERIAL: SIGNIFICANT CHANGE UP
BUN SERPL-MCNC: 23 MG/DL — SIGNIFICANT CHANGE UP (ref 7–23)
CALCIUM SERPL-MCNC: 8.5 MG/DL — SIGNIFICANT CHANGE UP (ref 8.5–10.1)
CHLORIDE SERPL-SCNC: 99 MMOL/L — SIGNIFICANT CHANGE UP (ref 96–108)
CK MB BLD-MCNC: 4 % — HIGH (ref 0–3.5)
CK MB CFR SERPL CALC: 1 NG/ML — SIGNIFICANT CHANGE UP (ref 0–3.6)
CK SERPL-CCNC: 25 U/L — LOW (ref 26–192)
CO2 SERPL-SCNC: 27 MMOL/L — SIGNIFICANT CHANGE UP (ref 22–31)
CREAT SERPL-MCNC: 1.2 MG/DL — SIGNIFICANT CHANGE UP (ref 0.5–1.3)
CRP SERPL-MCNC: 0.3 MG/DL — SIGNIFICANT CHANGE UP (ref 0–0.4)
GLUCOSE SERPL-MCNC: 170 MG/DL — HIGH (ref 70–99)
HCO3 BLDA-SCNC: 27 MMOL/L — SIGNIFICANT CHANGE UP (ref 23–27)
HCT VFR BLD CALC: 33.6 % — LOW (ref 34.5–45)
HGB BLD-MCNC: 10.9 G/DL — LOW (ref 11.5–15.5)
HOROWITZ INDEX BLDA+IHG-RTO: 21 — SIGNIFICANT CHANGE UP
MAGNESIUM SERPL-MCNC: 1.6 MG/DL — SIGNIFICANT CHANGE UP (ref 1.6–2.6)
MCHC RBC-ENTMCNC: 32.6 GM/DL — SIGNIFICANT CHANGE UP (ref 32–36)
MCHC RBC-ENTMCNC: 34.2 PG — HIGH (ref 27–34)
MCV RBC AUTO: 104.8 FL — HIGH (ref 80–100)
NT-PROBNP SERPL-SCNC: HIGH PG/ML (ref 0–450)
PCO2 BLDA: 51 MMHG — HIGH (ref 32–46)
PH BLDA: 7.36 — SIGNIFICANT CHANGE UP (ref 7.35–7.45)
PLATELET # BLD AUTO: 410 K/UL — HIGH (ref 150–400)
PO2 BLDA: 78 MMHG — SIGNIFICANT CHANGE UP (ref 74–108)
POTASSIUM SERPL-MCNC: 4.6 MMOL/L — SIGNIFICANT CHANGE UP (ref 3.5–5.3)
POTASSIUM SERPL-SCNC: 4.6 MMOL/L — SIGNIFICANT CHANGE UP (ref 3.5–5.3)
PROCALCITONIN SERPL-MCNC: 0.17 NG/ML — HIGH (ref 0–0.04)
RBC # BLD: 3.2 M/UL — LOW (ref 3.8–5.2)
RBC # FLD: 16.3 % — HIGH (ref 10.3–14.5)
SAO2 % BLDA: 99 % — HIGH (ref 92–96)
SODIUM SERPL-SCNC: 137 MMOL/L — SIGNIFICANT CHANGE UP (ref 135–145)
TROPONIN I SERPL-MCNC: <.015 NG/ML — SIGNIFICANT CHANGE UP (ref 0.01–0.04)
TSH SERPL-MCNC: 0.73 UIU/ML — SIGNIFICANT CHANGE UP (ref 0.36–3.74)
WBC # BLD: 13.2 K/UL — HIGH (ref 3.8–10.5)
WBC # FLD AUTO: 13.2 K/UL — HIGH (ref 3.8–10.5)

## 2018-04-21 PROCEDURE — 99233 SBSQ HOSP IP/OBS HIGH 50: CPT

## 2018-04-21 PROCEDURE — 99232 SBSQ HOSP IP/OBS MODERATE 35: CPT

## 2018-04-21 RX ORDER — BUDESONIDE, MICRONIZED 100 %
0.5 POWDER (GRAM) MISCELLANEOUS
Qty: 0 | Refills: 0 | Status: DISCONTINUED | OUTPATIENT
Start: 2018-04-21 | End: 2018-05-01

## 2018-04-21 RX ORDER — FAMOTIDINE 10 MG/ML
20 INJECTION INTRAVENOUS ONCE
Qty: 0 | Refills: 0 | Status: COMPLETED | OUTPATIENT
Start: 2018-04-21 | End: 2018-04-21

## 2018-04-21 RX ORDER — TIOTROPIUM BROMIDE AND OLODATEROL 3.124; 2.736 UG/1; UG/1
2 SPRAY, METERED RESPIRATORY (INHALATION) DAILY
Qty: 0 | Refills: 0 | Status: DISCONTINUED | OUTPATIENT
Start: 2018-04-21 | End: 2018-05-01

## 2018-04-21 RX ORDER — MORPHINE SULFATE 50 MG/1
0.25 CAPSULE, EXTENDED RELEASE ORAL
Qty: 0 | Refills: 0 | Status: DISCONTINUED | OUTPATIENT
Start: 2018-04-21 | End: 2018-04-21

## 2018-04-21 RX ORDER — FAMOTIDINE 10 MG/ML
20 INJECTION INTRAVENOUS ONCE
Qty: 0 | Refills: 0 | Status: DISCONTINUED | OUTPATIENT
Start: 2018-04-21 | End: 2018-04-21

## 2018-04-21 RX ORDER — ALBUTEROL 90 UG/1
2.5 AEROSOL, METERED ORAL EVERY 8 HOURS
Qty: 0 | Refills: 0 | Status: DISCONTINUED | OUTPATIENT
Start: 2018-04-21 | End: 2018-05-01

## 2018-04-21 RX ORDER — ACETAMINOPHEN 500 MG
650 TABLET ORAL ONCE
Qty: 0 | Refills: 0 | Status: COMPLETED | OUTPATIENT
Start: 2018-04-21 | End: 2018-04-21

## 2018-04-21 RX ORDER — MAGNESIUM SULFATE 500 MG/ML
2 VIAL (ML) INJECTION ONCE
Qty: 0 | Refills: 0 | Status: COMPLETED | OUTPATIENT
Start: 2018-04-21 | End: 2018-04-21

## 2018-04-21 RX ADMIN — Medication 20 MILLIGRAM(S): at 12:55

## 2018-04-21 RX ADMIN — Medication 0.5 MILLIGRAM(S): at 22:13

## 2018-04-21 RX ADMIN — PANTOPRAZOLE SODIUM 40 MILLIGRAM(S): 20 TABLET, DELAYED RELEASE ORAL at 05:01

## 2018-04-21 RX ADMIN — APIXABAN 2.5 MILLIGRAM(S): 2.5 TABLET, FILM COATED ORAL at 18:19

## 2018-04-21 RX ADMIN — Medication 650 MILLIGRAM(S): at 05:07

## 2018-04-21 RX ADMIN — FAMOTIDINE 104 MILLIGRAM(S): 10 INJECTION INTRAVENOUS at 05:00

## 2018-04-21 RX ADMIN — ALBUTEROL 2.5 MILLIGRAM(S): 90 AEROSOL, METERED ORAL at 09:29

## 2018-04-21 RX ADMIN — Medication 650 MILLIGRAM(S): at 05:27

## 2018-04-21 RX ADMIN — ALBUTEROL 2.5 MILLIGRAM(S): 90 AEROSOL, METERED ORAL at 21:55

## 2018-04-21 RX ADMIN — Medication 20 MILLIGRAM(S): at 23:46

## 2018-04-21 RX ADMIN — Medication 30 MILLILITER(S): at 04:49

## 2018-04-21 RX ADMIN — ALBUTEROL 2 PUFF(S): 90 AEROSOL, METERED ORAL at 04:52

## 2018-04-21 RX ADMIN — Medication 20 MILLIGRAM(S): at 05:00

## 2018-04-21 RX ADMIN — Medication 0.25 MILLIGRAM(S): at 02:39

## 2018-04-21 RX ADMIN — Medication 50 GRAM(S): at 16:47

## 2018-04-21 RX ADMIN — APIXABAN 2.5 MILLIGRAM(S): 2.5 TABLET, FILM COATED ORAL at 05:01

## 2018-04-21 RX ADMIN — Medication 0.5 MILLIGRAM(S): at 09:29

## 2018-04-21 RX ADMIN — Medication 20 MILLIGRAM(S): at 18:19

## 2018-04-21 RX ADMIN — Medication 50 MILLIGRAM(S): at 05:01

## 2018-04-21 RX ADMIN — ALBUTEROL 2.5 MILLIGRAM(S): 90 AEROSOL, METERED ORAL at 15:33

## 2018-04-21 RX ADMIN — TIOTROPIUM BROMIDE AND OLODATEROL 2 PUFF(S): 3.124; 2.736 SPRAY, METERED RESPIRATORY (INHALATION) at 16:48

## 2018-04-21 NOTE — PROGRESS NOTE ADULT - ASSESSMENT
83F w/pmh of COPD not on home 02, HTN, HLD, Afib on Eliquis, CHF, multiple compression fractures and ascending penetrating aortic ulcer presenting with sob for 3-4 days, admitted for CHF exacerbation with underyling copd

## 2018-04-21 NOTE — PROGRESS NOTE ADULT - PROBLEM SELECTOR PLAN 1
-Continue Lasix 20mg IV BID for today as patient continues to be short of breath, hold on switching to PO.   -Patient will need to be evaluated for home O2 if symptoms don't improve but this is likely due to increased pulm vascular congestion. doubt due to infection  -Echo pending  -Continue fluid restriction  -I and Os  -on beta blocker

## 2018-04-21 NOTE — PROGRESS NOTE ADULT - ASSESSMENT
83F w/pmh of COPD not on home 02, HTN, HLD, Afib on Eliquis, CHF, multiple compression fractures and ascending penetrating aortic ulcer presenting with sob.    Likely multifactorial 2/2 CHF exacerbation vs. COPD vs. aspiration pneumonia   No clear evidence of acute ischemia,    - resp distress this am requiring NIPPV remains on it currently tolerating well.    - Cont tele - AF 80s no tele events last night with MULU to 140s yesterday  - cont metoprolol XL 50 daily  - cont dilt 60 mg q6hrs now with better rate control  - ECHO 3/2016 showed moderate ischemic myopathy, moderate-severe MR, severe dilated left atrium, normal LV, EF 67%. Repeat ECHO here.   - ProBNP elevated. Continue Lasix 20mg BID IV.   - Pt has hx of chronic afib on Eliquis. CHADsVASc score 5. Continue Eliquis 2.5 mg BID (with normal renal function, she meets criteria for 5 bid, though continue 2.5 for now given her drop in Hb). Watch Hb  - CT chest with small bilateral effusions  - PE is also on the differential given her tachycardia and hypoxia.  - Monitor and replete lytes, keep K>4, Mg>2  - Other cardiovascular workup will depend on clinical course.  - All other workup per primary team  - Will follow     Madiha Da Silva NP-C  Cardiology

## 2018-04-21 NOTE — CONSULT NOTE ADULT - PROBLEM SELECTOR RECOMMENDATION 9
resp distress  likely multifactorial  pt has CHF and COPD ( Emphysema)  resp distress this am  req NIPPV  keep sat > 88 pct  ABG reviewed  oral and skin care  Albuterol NEBS atc and Solumedrol for COPD  cont Diuresis, optimize cvs regimen  will check Biomarkers, will check inflammatory markers  on AC, low index of suspicion of VTE  cont tele monitoring  will follow

## 2018-04-21 NOTE — PROGRESS NOTE ADULT - PROBLEM SELECTOR PLAN 3
abg consistent with hypercapnic   would likely need home 02  will resume stolatis pt home medication  pt is dyspnic, would request pulm f/u

## 2018-04-21 NOTE — PROGRESS NOTE ADULT - PROBLEM SELECTOR PLAN 2
-CT revealed small right greater than left pleural effusions. Scattered patchy right upper lobe pulmonary infiltrates new from the prior study.  -Clinically patient does not appear to have pneumonia Patient is afebrile, nontoxic appearing, no leukocytosis, mildly elevated procalcitonin. Discontinued abx  -Monitor O2 sat  -will monitor clinical symptoms

## 2018-04-21 NOTE — PROGRESS NOTE ADULT - PROBLEM SELECTOR PLAN 5
Hgb dropped from 12 to 10.3, now 10.9  No evidence of bleed  normal iron panel, pending b12 and folate  Monitor h/h daily

## 2018-04-21 NOTE — CONSULT NOTE ADULT - SUBJECTIVE AND OBJECTIVE BOX
Date/Time Patient Seen:  		  Referring MD:   Data Reviewed	       Patient is a 83y old  Female who presents with a chief complaint of shortness of breath (2018 14:05)      Subjective/HPI    in bed, seen and examined, vs and meds reviewed, pt with resp distress, req BIPAP  pt with CHF, COPD, Emphysema,     83F w/pmh of COPD not on home 02, HTN, HLD, Afib on Eliquis, CHF, multiple compression fractures and ascending penetrating aortic ulcer presenting with sob for 3-4 days, admitted for CHF exacerbation.      COPD not on home 02, HTN, HLD, Afib on Eliquis, CHF, multiple compression fractures and ascending penetrating aortic ulcer presenting with sob for 3-4 days. States that she has been having increasing sob for the past few days. States that she also has nausea and regurgitated clear fluids around 3-4 days ago. Pt has felt increasing weakness and lethargy with decreased appetite as well. She has tried nebulizers and inhalers for her sob without relief of her symptoms. Patient is unable to lie down flat due to compression fractures and difficulty breathing. Denies headache, fever, chills, vomiting, cough, chest pain, palpitations, peripheral edema and abdominal pain. No sick contacts. No recent travel. She ambulates with a cane. Patient has a history of spasmodic dysphonia which she was previously getting botox shots for but had an episode of aspiration so she stopped the injections.    In ED, patient was hypotensive with BP of 88/53, improved to 106/60. Remainder of vitals were within normal range. CBC  unremarkable, BMP significant for hyponatremia. ProBNP was 25,370. RVP negative. CT chest ordered and pending. Preliminary read of EKG revealed afib with ventricular rate of 85. CXR showed small right pleural effusion, questionable infiltrate. CT chest revealed small right greater than left pleural effusions. Scattered patchy right upper lobe pulmonary infiltrates new from the prior study. Emphysema. Stable peripherally calcified lesion in the spleen. Patient given Rocephin and zithromax in ED. (2018 17:33)       PAST MEDICAL & SURGICAL HISTORY:  CHF (congestive heart failure)  Compression fracture: Aug 2015  Carbon monoxide poisonin  Pulmonary emphysema, unspecified emphysema type  Secondary hypertension  Atrial fibrillation, unspecified type  History of arthroscopy of knee  History of appendectomy:   History of right shoulder replacement: 2005  S/P appy        Medication list         MEDICATIONS  (STANDING):  apixaban 2.5 milliGRAM(s) Oral every 12 hours  diltiazem    Tablet 60 milliGRAM(s) Oral every 6 hours  furosemide   Injectable 20 milliGRAM(s) IV Push two times a day  metoprolol succinate ER 50 milliGRAM(s) Oral daily  pantoprazole    Tablet 40 milliGRAM(s) Oral before breakfast    MEDICATIONS  (PRN):  ALBUTerol    90 MICROgram(s) HFA Inhaler 2 Puff(s) Inhalation every 6 hours PRN Bronchospasm         Vitals log        ICU Vital Signs Last 24 Hrs  T(C): 36.9 (2018 04:47), Max: 36.9 (2018 04:47)  T(F): 98.5 (2018 04:47), Max: 98.5 (2018 04:47)  HR: 88 (2018 06:21) (85 - 135)  BP: 118/61 (2018 04:47) (94/69 - 128/83)  BP(mean): --  ABP: --  ABP(mean): --  RR: 21 (2018 04:47) (16 - 21)  SpO2: 96% (2018 06:21) (82% - 100%)           Input and Output:  I&O's Detail    2018 07:01  -  2018 07:00  --------------------------------------------------------  IN:    Oral Fluid: 560 mL    Solution: 50 mL    Solution: 250 mL  Total IN: 860 mL    OUT:    Voided: 1050 mL  Total OUT: 1050 mL    Total NET: -190 mL      2018 07:01  -  2018 06:48  --------------------------------------------------------  IN:    Oral Fluid: 900 mL    Solution: 50 mL  Total IN: 950 mL    OUT:    Voided: 1275 mL  Total OUT: 1275 mL    Total NET: -325 mL          Lab Data                        10.9   13.2  )-----------( 410      ( 2018 06:01 )             33.6     04-21    137  |  99  |  23  ----------------------------<  170<H>  4.6   |  27  |  1.20    Ca    8.5      2018 06:02      ABG - ( 2018 01:36 )  pH: 7.36  /  pCO2: 51    /  pO2: 78    / HCO3: 27    / Base Excess: 3.6   /  SaO2: 99            non smoker  non drinker      CARDIAC MARKERS ( 2018 02:34 )  <.015 ng/mL / x     / 25 U/L / x     / 1.0 ng/mL        Review of Systems	  sob  acharya      Objective     Physical Examination    head at  heart s1s2  lung dec BS  abd soft      Pertinent Lab findings & Imaging      Elpidio:  NO   Adequate UO     I&O's Detail    2018 07:  -  2018 07:00  --------------------------------------------------------  IN:    Oral Fluid: 560 mL    Solution: 50 mL    Solution: 250 mL  Total IN: 860 mL    OUT:    Voided: 1050 mL  Total OUT: 1050 mL    Total NET: -190 mL      2018 07:01  -  2018 06:48  --------------------------------------------------------  IN:    Oral Fluid: 900 mL    Solution: 50 mL  Total IN: 950 mL    OUT:    Voided: 1275 mL  Total OUT: 1275 mL    Total NET: -325 mL               Discussed with:     Cultures:	        Radiology

## 2018-04-21 NOTE — PROGRESS NOTE ADULT - SUBJECTIVE AND OBJECTIVE BOX
[facility] -- Daniella Kenyon, Ghanshyam Vaca, Jhonatan Quintero Savella  Office # [phone]      Subjective/Observations:  Pt seen and examined.   Resting comfortably in bed with BiPAP in place in NAD.  Events noted.  Pt denies cp, sob or palpitations presently.  No orthopnea noted.        REVIEW OF SYSTEMS: All other review of systems is negative unless indicated above    PAST MEDICAL & SURGICAL HISTORY:  CHF (congestive heart failure)  Compression fracture: Aug 2015  Carbon monoxide poisonin  Pulmonary emphysema, unspecified emphysema type  Secondary hypertension  Atrial fibrillation, unspecified type  History of arthroscopy of knee  History of appendectomy:   History of right shoulder replacement:       MEDICATIONS  (STANDING):  ALBUTerol    0.083% 2.5 milliGRAM(s) Nebulizer every 8 hours  apixaban 2.5 milliGRAM(s) Oral every 12 hours  buDESOnide   0.5 milliGRAM(s) Respule 0.5 milliGRAM(s) Inhalation two times a day  diltiazem    Tablet 60 milliGRAM(s) Oral every 6 hours  furosemide   Injectable 20 milliGRAM(s) IV Push two times a day  magnesium sulfate  IVPB 2 Gram(s) IV Intermittent once  methylPREDNISolone sodium succinate Injectable 20 milliGRAM(s) IV Push every 8 hours  metoprolol succinate ER 50 milliGRAM(s) Oral daily  pantoprazole    Tablet 40 milliGRAM(s) Oral before breakfast    MEDICATIONS  (PRN):  ALBUTerol    90 MICROgram(s) HFA Inhaler 2 Puff(s) Inhalation every 6 hours PRN Bronchospasm  morphine  - Injectable 0.25 milliGRAM(s) IV Push every 3 hours PRN resp distress, dyspnea, pain,      Allergies    No Known Allergies    Intolerances            Vital Signs Last 24 Hrs  T(C): 36.7 (2018 07:23), Max: 36.9 (2018 04:47)  T(F): 98 (2018 07:23), Max: 98.5 (2018 04:47)  HR: 85 (2018 09:40) (71 - 102)  BP: 105/67 (2018 07:23) (105/67 - 128/83)  BP(mean): --  RR: 20 (2018 07:23) (16 - 21)  SpO2: 96% (2018 09:40) (94% - 100%)    I&O's Summary    2018 07:01  -  2018 07:00  --------------------------------------------------------  IN: 950 mL / OUT: 1275 mL / NET: -325 mL          PHYSICAL EXAM:  TELE: AF 80s no events overnight with episode to 140s yesterday and 3 beats WCT  Constitutional: NAD, awake and alert, frail with bipap in place  HEENT: Moist Mucous Membranes, Anicteric  Pulmonary: Non-labored, distant breath sounds otherwise clear bilaterally, No wheezing, rales or rhonchi  Cardiovascular: irregular, S1 and S2, No murmurs, rubs, gallops or clicks  Gastrointestinal: Bowel Sounds present, soft, nontender.   Lymph: No peripheral edema. No lymphadenopathy.  Skin: No visible rashes or ulcers.  Psych:  Mood & affect appropriate    LABS: All Labs Reviewed:                        10.9   13.2  )-----------( 410      ( 2018 06:01 )             33.6                         10.9   8.4   )-----------( 358      ( 2018 07:12 )             32.3                         10.3   7.9   )-----------( 323      ( 2018 05:47 )             30.4     2018 06:02    137    |  99     |  23     ----------------------------<  170    4.6     |  27     |  1.20   2018 07:12    136    |  99     |  17     ----------------------------<  92     4.7     |  30     |  1.10   2018 05:47    132    |  96     |  17     ----------------------------<  94     3.1     |  27     |  0.81     Ca    8.5        2018 06:02  Ca    8.4        2018 07:12  Ca    8.0        2018 05:47  Mg     1.6       2018 07:01    TPro  6.6    /  Alb  3.2    /  TBili  0.7    /  DBili  x      /  AST  19     /  ALT  12     /  AlkPhos  108    2018 13:37    < from: Xray Chest 1 View- PORTABLE-Urgent (18 @ 22:29) >  EXAM:  XR CHEST PORTABLE URGENT 1V                            PROCEDURE DATE:  2018          INTERPRETATION:  CHF, follow-up.    AP chest. Prior 2018.    Very low lung volumes. No change heart mediastinum. Bibasilar   interstitial edema small hazy effusions consistent with history of CHF.   Question superimposed airspace infiltrate at the right base. Recommend   clinical correlation close follow-up. Remainder study unchanged.    Impression: As above                TERESA VALLEJO M.D., ATTENDING RADIOLOGIST  This document has been electronically signed. 2018  8:20AM    < end of copied text >          CARDIAC MARKERS ( 2018 02:34 )  <.015 ng/mL / x     / 25 U/L / x     / 1.0 ng/mL

## 2018-04-21 NOTE — PROGRESS NOTE ADULT - SUBJECTIVE AND OBJECTIVE BOX
Patient is a 83y old  Female who presents with a chief complaint of shortness of breath (20 Apr 2018 14:05)        HPI:  83F w/pmh of COPD not on home 02, HTN, HLD, Afib on Eliquis, CHF, multiple compression fractures and ascending penetrating aortic ulcer presenting with sob for 3-4 days. States that she has been having increasing sob for the past few days. States that she also has nausea and regurgitated clear fluids around 3-4 days ago. Pt has felt increasing weakness and lethargy with decreased appetite as well. She has tried nebulizers and inhalers for her sob without relief of her symptoms. Patient is unable to lie down flat due to compression fractures and difficulty breathing. Denies headache, fever, chills, vomiting, cough, chest pain, palpitations, peripheral edema and abdominal pain. No sick contacts. No recent travel. She ambulates with a cane. Patient has a history of spasmodic dysphonia which she was previously getting botox shots for but had an episode of aspiration so she stopped the injections.    In ED, patient was hypotensive with BP of 88/53, improved to 106/60. Remainder of vitals were within normal range. CBC  unremarkable, BMP significant for hyponatremia. ProBNP was 25,370. RVP negative. CT chest ordered and pending. Preliminary read of EKG revealed afib with ventricular rate of 85. CXR showed small right pleural effusion, questionable infiltrate. CT chest revealed small right greater than left pleural effusions. Scattered patchy right upper lobe pulmonary infiltrates new from the prior study. Emphysema. Stable peripherally calcified lesion in the spleen. Patient given Rocephin and zithromax in ED. (18 Apr 2018 17:33)      SUBJECTIVE & OBJECTIVE: Pt seen and examined at bedside, complaining of shortness of beath     PHYSICAL EXAM:  T(C): 36.7 (04-21-18 @ 07:23), Max: 36.9 (04-21-18 @ 04:47)  HR: 84 (04-21-18 @ 12:23) (71 - 102)  BP: 105/67 (04-21-18 @ 07:23) (105/67 - 128/83)  RR: 20 (04-21-18 @ 07:23) (16 - 21)  SpO2: 99% (04-21-18 @ 12:23) (94% - 100%)  Wt(kg): --   GENERAL: NAD, well-groomed, well-developed  HEAD:  Atraumatic, Normocephalic  EYES: EOMI, PERRLA, conjunctiva and sclera clear  ENMT: Moist mucous membranes  NECK: Supple, No JVD  NERVOUS SYSTEM:  Alert & Oriented X3,   CHEST/LUNG: decrease air entry b/l   HEART: Regular rate and rhythm; No murmurs, rubs, or gallops  ABDOMEN: Soft, Nontender, Nondistended; Bowel sounds present  EXTREMITIES:  2+ Peripheral Pulses, No clubbing, cyanosis, or edema        MEDICATIONS  (STANDING):  ALBUTerol    0.083% 2.5 milliGRAM(s) Nebulizer every 8 hours  apixaban 2.5 milliGRAM(s) Oral every 12 hours  buDESOnide   0.5 milliGRAM(s) Respule 0.5 milliGRAM(s) Inhalation two times a day  diltiazem    Tablet 60 milliGRAM(s) Oral every 6 hours  furosemide   Injectable 20 milliGRAM(s) IV Push two times a day  magnesium sulfate  IVPB 2 Gram(s) IV Intermittent once  methylPREDNISolone sodium succinate Injectable 20 milliGRAM(s) IV Push every 8 hours  metoprolol succinate ER 50 milliGRAM(s) Oral daily  pantoprazole    Tablet 40 milliGRAM(s) Oral before breakfast    MEDICATIONS  (PRN):  ALBUTerol    90 MICROgram(s) HFA Inhaler 2 Puff(s) Inhalation every 6 hours PRN Bronchospasm  morphine  - Injectable 0.25 milliGRAM(s) IV Push every 3 hours PRN resp distress, dyspnea, pain,      LABS:                        10.9   13.2  )-----------( 410      ( 21 Apr 2018 06:01 )             33.6     04-21    137  |  99  |  23  ----------------------------<  170<H>  4.6   |  27  |  1.20    Ca    8.5      21 Apr 2018 06:02  Mg     1.6     04-21          Magnesium, Serum: 1.6 mg/dL (04-21 @ 07:01)    CAPILLARY BLOOD GLUCOSE          CAPILLARY BLOOD GLUCOSE        CAPILLARY BLOOD GLUCOSE        ABG - ( 21 Apr 2018 01:36 )  pH: 7.36  /  pCO2: 51    /  pO2: 78    / HCO3: 27    / Base Excess: 3.6   /  SaO2: 99                CARDIAC MARKERS ( 21 Apr 2018 02:34 )  <.015 ng/mL / x     / 25 U/L / x     / 1.0 ng/mL        RECENT CULTURES:      RADIOLOGY & ADDITIONAL TESTS:                        DVT/GI ppx  Discussed with pt @ bedside

## 2018-04-21 NOTE — PROGRESS NOTE ADULT - PROBLEM SELECTOR PLAN 7
Continue metoprolol 50mg daily with low BP  [id number] score of 5, continue anticoagulation with Eliquis 2.5 BID  clearance today is slightly less than 30. weight is 40kg (<60kg), age >82.   spoke w/ clinical pharmacy, will continue current care (for now)

## 2018-04-22 LAB
HCT VFR BLD CALC: 31.1 % — LOW (ref 34.5–45)
HCT VFR BLD CALC: 31.1 % — LOW (ref 34.5–45)
HGB BLD-MCNC: 10.5 G/DL — LOW (ref 11.5–15.5)
HGB BLD-MCNC: 10.5 G/DL — LOW (ref 11.5–15.5)

## 2018-04-22 PROCEDURE — 93306 TTE W/DOPPLER COMPLETE: CPT | Mod: 26

## 2018-04-22 PROCEDURE — 99233 SBSQ HOSP IP/OBS HIGH 50: CPT

## 2018-04-22 PROCEDURE — 99232 SBSQ HOSP IP/OBS MODERATE 35: CPT

## 2018-04-22 RX ORDER — FUROSEMIDE 40 MG
20 TABLET ORAL DAILY
Qty: 0 | Refills: 0 | Status: DISCONTINUED | OUTPATIENT
Start: 2018-04-22 | End: 2018-04-24

## 2018-04-22 RX ORDER — FUROSEMIDE 40 MG
20 TABLET ORAL DAILY
Qty: 0 | Refills: 0 | Status: DISCONTINUED | OUTPATIENT
Start: 2018-04-22 | End: 2018-04-22

## 2018-04-22 RX ORDER — APIXABAN 2.5 MG/1
2.5 TABLET, FILM COATED ORAL EVERY 12 HOURS
Qty: 0 | Refills: 0 | Status: DISCONTINUED | OUTPATIENT
Start: 2018-04-22 | End: 2018-05-01

## 2018-04-22 RX ORDER — METOPROLOL TARTRATE 50 MG
25 TABLET ORAL
Qty: 0 | Refills: 0 | Status: DISCONTINUED | OUTPATIENT
Start: 2018-04-22 | End: 2018-05-01

## 2018-04-22 RX ORDER — ACETAMINOPHEN 500 MG
650 TABLET ORAL ONCE
Qty: 0 | Refills: 0 | Status: COMPLETED | OUTPATIENT
Start: 2018-04-22 | End: 2018-04-22

## 2018-04-22 RX ADMIN — Medication 20 MILLIGRAM(S): at 06:26

## 2018-04-22 RX ADMIN — Medication 20 MILLIGRAM(S): at 13:23

## 2018-04-22 RX ADMIN — Medication 25 MILLIGRAM(S): at 17:34

## 2018-04-22 RX ADMIN — TIOTROPIUM BROMIDE AND OLODATEROL 2 PUFF(S): 3.124; 2.736 SPRAY, METERED RESPIRATORY (INHALATION) at 07:29

## 2018-04-22 RX ADMIN — PANTOPRAZOLE SODIUM 40 MILLIGRAM(S): 20 TABLET, DELAYED RELEASE ORAL at 06:26

## 2018-04-22 RX ADMIN — ALBUTEROL 2.5 MILLIGRAM(S): 90 AEROSOL, METERED ORAL at 15:08

## 2018-04-22 RX ADMIN — Medication 20 MILLIGRAM(S): at 11:25

## 2018-04-22 RX ADMIN — ALBUTEROL 2.5 MILLIGRAM(S): 90 AEROSOL, METERED ORAL at 21:22

## 2018-04-22 RX ADMIN — Medication 0.5 MILLIGRAM(S): at 07:42

## 2018-04-22 RX ADMIN — Medication 650 MILLIGRAM(S): at 15:00

## 2018-04-22 RX ADMIN — APIXABAN 2.5 MILLIGRAM(S): 2.5 TABLET, FILM COATED ORAL at 17:34

## 2018-04-22 RX ADMIN — Medication 0.5 MILLIGRAM(S): at 21:22

## 2018-04-22 RX ADMIN — ALBUTEROL 2.5 MILLIGRAM(S): 90 AEROSOL, METERED ORAL at 07:42

## 2018-04-22 RX ADMIN — Medication 20 MILLIGRAM(S): at 21:41

## 2018-04-22 NOTE — PROGRESS NOTE ADULT - PROBLEM SELECTOR PLAN 1
CHF  Resp Distress  COPD  cont NEBS and Stiolto and Solumedrol  cont LASIX for CHF  CXR reviewed  PRN use of BIPAP for resp distress  monitor VS and HD and Sat  keep sat > 88 pct  keep MAP > 60  am labs pending  replete lytes  assist with ADL  prognosis guarded  will follow

## 2018-04-22 NOTE — CHART NOTE - NSCHARTNOTEFT_GEN_A_CORE
Notified by RN that patient is bleeding per rectum. Patient seen and examined at bedside. Blood is present at rectum and on wipe. Patient denies SOB, CP, dizziness, lightheadedness and all other complaints. States that "she feels fine." States that she has a history of hemorrhoids and constipation. Never had blood transfusion.     Repeat vitals:  BP: 120/66 temp: 98 HR: 74 SpO2: 93 on 3L     Physical Exam:  General: Well developed, well nourished, NAD  HEENT: NCAT, PERRLA, EOMI bl, moist mucous membranes   Neck: Supple, nontender, no mass  Neurology: A&Ox3, nonfocal  Respiratory: CTA B/L, No W/R/R  CV: RRR, +S1/S2, no murmurs, rubs or gallops  Abdominal: Soft, NT, ND +BSx4  Extremities: No C/C/E, + peripheral pulses  Skin: bright red blood present at rectum, no abnormal lesions    A/P: 83F w/pmh of COPD not on home 02, HTN, HLD, Afib on Eliquis, CHF, multiple compression fractures and ascending penetrating aortic ulcer presenting with sob for 3-4 days, admitted for CHF exacerbation with underlying copd.  Called by Rn due to rectal bleeding.    -VSS. Will repeat H/H stat.  -Rectal bleeding likely 2/2 to hemorrhoids and/constipation.   -HOLD eliquis for now.  -RN to call if any changes.    Lima Stockton DO, PGY1

## 2018-04-22 NOTE — PROGRESS NOTE ADULT - SUBJECTIVE AND OBJECTIVE BOX
CARDIOLOGY FOLLOW UP    SUBJECTIVE:  Patient is a 83y old  Female who presents with a chief complaint of shortness of breath (2018 14:05)    Awake and alert.  Denies palpitations or CP.  However, states he is unable to lay flat, which is her baseline for several years.  States, she wore only her BIPAP briefly last night due to discomfort    OBJECTIVE:  Review Of Systems:  Constitutional: [ ] Fever [ ] Chills [ ] Fatigue [ ] Weight change   HEENT: [ ] Blurred vision [ ] Eye Pain [ ] Headache [ ] Runny nose [ ] Sore Throat   Respiratory: [ ] Cough [ ] Wheezing [ ] Shortness of breath  Cardiovascular: [ ] Chest Pain [ ] Palpitations [ ] HUFF [ ] PND [ ] Orthopnea  Gastrointestinal: [ ] Abdominal Pain [ ] Diarrhea [ ] Constipation [ ] Hemorrhoids [ ] Nausea [ ] Vomiting  Genitourinary: [ ] Nocturia [ ] Dysuria [ ] Incontinence  Extremities: [ ] Swelling [ ] Joint Pain  Neurologic: [ ] Focal deficit [ ] Paresthesias [ ] Syncope  Lymphatic: [ ] Swelling [ ] Lymphadenopathy   Skin: [ ] Rash [ ] Ecchymoses [ ] Wounds [ ] Lesions  Psychiatry: [ ] Depression [ ] Suicidal/Homicidal Ideation [ ] Anxiety [ ] Sleep Disturbances  [ x] 10 point review of systems is otherwise negative except as mentioned above            [ ]Unable to obtain    Allergy:  Allergies    No Known Allergies    Intolerances    Medications:  MEDICATIONS  (STANDING):  ALBUTerol    0.083% 2.5 milliGRAM(s) Nebulizer every 8 hours  buDESOnide   0.5 milliGRAM(s) Respule 0.5 milliGRAM(s) Inhalation two times a day  diltiazem    Tablet 60 milliGRAM(s) Oral every 6 hours  furosemide   Injectable 20 milliGRAM(s) IV Push daily  methylPREDNISolone sodium succinate Injectable 20 milliGRAM(s) IV Push every 8 hours  metoprolol tartrate 25 milliGRAM(s) Oral two times a day  pantoprazole    Tablet 40 milliGRAM(s) Oral before breakfast  tiotropium 2.5 MICROgram(s)/olodaterol 2.5 MICROgram(s) Inhaler 2 Puff(s) Inhalation daily    MEDICATIONS  (PRN):  ALBUTerol    90 MICROgram(s) HFA Inhaler 2 Puff(s) Inhalation every 6 hours PRN Bronchospasm  morphine  - Injectable 0.25 milliGRAM(s) IV Push every 3 hours PRN resp distress, dyspnea, pain,    PMH/PSH/FH/SH: [ ] Unchanged    Vitals:  T(C): 36.6 (18 @ 07:44), Max: 36.7 (18 @ 03:30)  HR: 84 (18 @ 07:44) (67 - 90)  BP: 99/68 (18 @ 07:44) (99/67 - 120/66)  BP(mean): --  RR: 19 (18 @ 07:44) (19 - 20)  SpO2: 98% (18 @ 07:44) (93% - 99%)  Wt(kg): --  Daily     Daily Weight in k.1 (2018 05:05)  I&O's Summary    2018 07:01  -  2018 07:00  --------------------------------------------------------  IN: 0 mL / OUT: 200 mL / NET: -200 mL    Labs:                        10.5   x     )-----------( x        ( 2018 04:43 )             31.1     04-21    137  |  99  |  23  ----------------------------<  170<H>  4.6   |  27  |  1.20    Ca    8.5      2018 06:02  Mg     1.6     04-21    CARDIAC MARKERS ( 2018 02:34 )  <.015 ng/mL / x     / 25 U/L / x     / 1.0 ng/mL    ECG:  < from: 12 Lead ECG (18 @ 13:03) >    Ventricular Rate 106 BPM    Atrial Rate 131 BPM    QRS Duration 76 ms    Q-T Interval 354 ms    QTC Calculation(Bezet) 470 ms    R Axis -3 degrees    T Axis -50 degrees    Diagnosis Line Atrial fibrillation with rapid ventricular response  Septal infarct , age undetermined  Nonspecific ST abnormality  Abnormal ECG      Confirmed by Hung Ruiz ([id number]) on 2018 11:12:32 AM    < end of copied text >    Echo:  Pending  Stress Testing:     Cath:    Imaging:  < from: CT Chest No Cont (18 @ 18:01) >    EXAM:  CT CHEST                          PROCEDURE DATE:  2018        INTERPRETATION:  CLINICAL INFORMATION:  Shortness of breath    PROCEDURE:  Using multislice helical CT, thin sections were obtained from   the thoracic inlet through the lung bases.    COMPARISON: Chest x-ray of earlier in the day and CT chest of 2018    FINDINGS:      There is no significant axillary, hilar, or mediastinal lymphadenopathy.   There is no pericardial effusion. There are small free-flowing pleural   effusions right greater than left There is atherosclerotic calcification   of the aorta and tracheobronchial calcification. There is coronary artery   calcification.    There is emphysema. There is bibasilar atelectasis. There are patchy   areas of infiltrate in the right upper lobe.    The osseous structures demonstrate osteopenia and degenerative change.    The upper abdomen is remarkable for extensive vascular calcifications   with rounded peripherally calcified lesion again identified in the spleen   there are probable small gallstones in the gallbladder.    IMPRESSION: Small right greater than left pleural effusions  Scattered patchy right upper lobe pulmonary infiltrates new from the   prior study  Emphysema  Stable peripherally calcified lesion in the spleen    BERTO GARCIA M.D.,ATTENDING RADIOLOGIST  This document has been electronically signed. 2018  6:24PM    < end of copied text >    < from: Xray Chest 1 View- PORTABLE-Urgent (18 @ 22:29) >    EXAM:  XR CHEST PORTABLE URGENT 1V                            PROCEDURE DATE:  2018      INTERPRETATION:  CHF, follow-up.    AP chest. Prior 2018.    Very low lung volumes. No change heart mediastinum. Bibasilar   interstitial edema small hazy effusions consistent with history of CHF.   Question superimposed airspace infiltrate at the right base. Recommend   clinical correlation close follow-up. Remainder study unchanged.    Impression: As above    TERESA VALLEJO M.D., ATTENDING RADIOLOGIST  This document has been electronically signed. 2018  8:20AM    < end of copied text >    Interpretation of Telemetry:  Afib    Physical Exam:  Appearance: [ ] Normal  [ ] abnormal [x ] NAD [x] Cachectic  Eyes: [ ] PERRL [ ] EOMI  HENT: [ ] Normal [ ] Abnormal oral muscosa [ ]NC/AT  Cardiovascular: [x ] S1 [ x] S2 [ ] RRR  [x] Irregular  [ x] m/r/g [ ]edema [ ] JVP  Procedural Access Site: [ ]  hematoma [ ] tender to palpation [ ] 2+ pulse [ ] bruit [ ] Ecchymosis  Respiratory: [ ] Clear to auscultation bilaterally  [x] Diminished with fine rales/rhonchi at bases  Gastrointestinal: [x ] Soft [ ] tenderness[ ] distension [x ] BS  Musculoskeletal: [ ] clubbing [ ] joint deformity   Neurologic: [x ] Non-focal  Lymphatic: [ ] lymphadenopathy  Psychiatry: [x ] AAOx3  [ ] confused [ ] disoriented [ x] Mood & affect appropriate  Skin: [ ]  rashes [ ] ecchymoses [ ] cyanosis

## 2018-04-22 NOTE — PROGRESS NOTE ADULT - SUBJECTIVE AND OBJECTIVE BOX
Date/Time Patient Seen:  		  Referring MD:   Data Reviewed	       Patient is a 83y old  Female who presents with a chief complaint of shortness of breath (2018 14:05)  in bed  seen and examined  vs and meds reviewed  on LASIX  on NEBS  on o2      Subjective/HPI     PAST MEDICAL & SURGICAL HISTORY:  CHF (congestive heart failure)  Compression fracture: Aug 2015  Carbon monoxide poisonin  Pulmonary emphysema, unspecified emphysema type  Secondary hypertension  Atrial fibrillation, unspecified type  History of arthroscopy of knee  History of appendectomy:   History of right shoulder replacement:   S/P appy        Medication list         MEDICATIONS  (STANDING):  ALBUTerol    0.083% 2.5 milliGRAM(s) Nebulizer every 8 hours  buDESOnide   0.5 milliGRAM(s) Respule 0.5 milliGRAM(s) Inhalation two times a day  diltiazem    Tablet 60 milliGRAM(s) Oral every 6 hours  furosemide   Injectable 20 milliGRAM(s) IV Push two times a day  methylPREDNISolone sodium succinate Injectable 20 milliGRAM(s) IV Push every 8 hours  metoprolol succinate ER 50 milliGRAM(s) Oral daily  pantoprazole    Tablet 40 milliGRAM(s) Oral before breakfast  tiotropium 2.5 MICROgram(s)/olodaterol 2.5 MICROgram(s) Inhaler 2 Puff(s) Inhalation daily    MEDICATIONS  (PRN):  ALBUTerol    90 MICROgram(s) HFA Inhaler 2 Puff(s) Inhalation every 6 hours PRN Bronchospasm  morphine  - Injectable 0.25 milliGRAM(s) IV Push every 3 hours PRN resp distress, dyspnea, pain,         Vitals log        ICU Vital Signs Last 24 Hrs  T(C): 36.4 (2018 05:05), Max: 36.7 (2018 07:23)  T(F): 97.6 (2018 05:05), Max: 98 (2018 07:23)  HR: 80 (2018 05:05) (67 - 88)  BP: 103/64 (2018 05:05) (99/67 - 120/66)  BP(mean): --  ABP: --  ABP(mean): --  RR: 19 (2018 05:05) (19 - 20)  SpO2: 97% (2018 05:05) (93% - 100%)           Input and Output:  I&O's Detail    2018 07:  -  2018 07:00  --------------------------------------------------------  IN:    Oral Fluid: 900 mL    Solution: 50 mL  Total IN: 950 mL    OUT:    Voided: 1275 mL  Total OUT: 1275 mL    Total NET: -325 mL      2018 07:  -  2018 06:32  --------------------------------------------------------  IN:  Total IN: 0 mL    OUT:    Voided: 200 mL  Total OUT: 200 mL    Total NET: -200 mL          Lab Data                        10.5   x     )-----------( x        ( 2018 04:43 )             31.1     -    137  |  99  |  23  ----------------------------<  170<H>  4.6   |  27  |  1.20    Ca    8.5      2018 06:02  Mg     1.6           ABG - ( 2018 01:36 )  pH: 7.36  /  pCO2: 51    /  pO2: 78    / HCO3: 27    / Base Excess: 3.6   /  SaO2: 99                CARDIAC MARKERS ( 2018 02:34 )  <.015 ng/mL / x     / 25 U/L / x     / 1.0 ng/mL        Review of Systems	      Objective     Physical Examination    frail  weak  lung dec BS  abd soft      Pertinent Lab findings & Imaging      Elpidio:  NO   Adequate UO     I&O's Detail    2018 07:  -  2018 07:00  --------------------------------------------------------  IN:    Oral Fluid: 900 mL    Solution: 50 mL  Total IN: 950 mL    OUT:    Voided: 1275 mL  Total OUT: 1275 mL    Total NET: -325 mL      2018 07:01  -  2018 06:32  --------------------------------------------------------  IN:  Total IN: 0 mL    OUT:    Voided: 200 mL  Total OUT: 200 mL    Total NET: -200 mL               Discussed with:     Cultures:	        Radiology

## 2018-04-22 NOTE — PROGRESS NOTE ADULT - SUBJECTIVE AND OBJECTIVE BOX
Patient is a 83y old  Female who presents with a chief complaint of shortness of breath (20 Apr 2018 14:05)        HPI:  83F w/pmh of COPD not on home 02, HTN, HLD, Afib on Eliquis, CHF, multiple compression fractures and ascending penetrating aortic ulcer presenting with sob for 3-4 days. States that she has been having increasing sob for the past few days. States that she also has nausea and regurgitated clear fluids around 3-4 days ago. Pt has felt increasing weakness and lethargy with decreased appetite as well. She has tried nebulizers and inhalers for her sob without relief of her symptoms. Patient is unable to lie down flat due to compression fractures and difficulty breathing. Denies headache, fever, chills, vomiting, cough, chest pain, palpitations, peripheral edema and abdominal pain. No sick contacts. No recent travel. She ambulates with a cane. Patient has a history of spasmodic dysphonia which she was previously getting botox shots for but had an episode of aspiration so she stopped the injections.    In ED, patient was hypotensive with BP of 88/53, improved to 106/60. Remainder of vitals were within normal range. CBC  unremarkable, BMP significant for hyponatremia. ProBNP was 25,370. RVP negative. CT chest ordered and pending. Preliminary read of EKG revealed afib with ventricular rate of 85. CXR showed small right pleural effusion, questionable infiltrate. CT chest revealed small right greater than left pleural effusions. Scattered patchy right upper lobe pulmonary infiltrates new from the prior study. Emphysema. Stable peripherally calcified lesion in the spleen. Patient given Rocephin and zithromax in ED. (18 Apr 2018 17:33)      SUBJECTIVE & OBJECTIVE: Pt seen and examined at bedside, feels much better, no acute complaints      PHYSICAL EXAM:  T(C): 36.6 (04-22-18 @ 07:44), Max: 36.7 (04-22-18 @ 03:30)  HR: 84 (04-22-18 @ 07:44) (67 - 90)  BP: 99/68 (04-22-18 @ 07:44) (99/67 - 120/66)  RR: 19 (04-22-18 @ 07:44) (19 - 20)  SpO2: 98% (04-22-18 @ 07:44) (93% - 99%)  Wt(kg): --   GENERAL: NAD, well-groomed, well-developed  HEAD:  Atraumatic, Normocephalic  EYES: EOMI, PERRLA, conjunctiva and sclera clear  ENMT: Moist mucous membranes  NECK: Supple, No JVD  NERVOUS SYSTEM:  Alert & Oriented X3,  CHEST/LUNG: mild wheezing at the bases   HEART: Regular rate and rhythm; No murmurs, rubs, or gallops  ABDOMEN: Soft, Nontender, Nondistended; Bowel sounds present  EXTREMITIES:  2+ Peripheral Pulses, No clubbing, cyanosis, or edema        MEDICATIONS  (STANDING):  ALBUTerol    0.083% 2.5 milliGRAM(s) Nebulizer every 8 hours  apixaban 2.5 milliGRAM(s) Oral every 12 hours  buDESOnide   0.5 milliGRAM(s) Respule 0.5 milliGRAM(s) Inhalation two times a day  diltiazem    Tablet 60 milliGRAM(s) Oral every 6 hours  furosemide    Tablet 20 milliGRAM(s) Oral daily  methylPREDNISolone sodium succinate Injectable 20 milliGRAM(s) IV Push every 8 hours  metoprolol tartrate 25 milliGRAM(s) Oral two times a day  pantoprazole    Tablet 40 milliGRAM(s) Oral before breakfast  tiotropium 2.5 MICROgram(s)/olodaterol 2.5 MICROgram(s) Inhaler 2 Puff(s) Inhalation daily    MEDICATIONS  (PRN):  ALBUTerol    90 MICROgram(s) HFA Inhaler 2 Puff(s) Inhalation every 6 hours PRN Bronchospasm  morphine  - Injectable 0.25 milliGRAM(s) IV Push every 3 hours PRN resp distress, dyspnea, pain,      LABS:                        10.5   x     )-----------( x        ( 22 Apr 2018 04:43 )             31.1     04-21    137  |  99  |  23  ----------------------------<  170<H>  4.6   |  27  |  1.20    Ca    8.5      21 Apr 2018 06:02  Mg     1.6     04-21            CAPILLARY BLOOD GLUCOSE          CAPILLARY BLOOD GLUCOSE        CAPILLARY BLOOD GLUCOSE        ABG - ( 21 Apr 2018 01:36 )  pH: 7.36  /  pCO2: 51    /  pO2: 78    / HCO3: 27    / Base Excess: 3.6   /  SaO2: 99                CARDIAC MARKERS ( 21 Apr 2018 02:34 )  <.015 ng/mL / x     / 25 U/L / x     / 1.0 ng/mL        RECENT CULTURES:      RADIOLOGY & ADDITIONAL TESTS:                        DVT/GI ppx  Discussed with pt @ bedside

## 2018-04-22 NOTE — PROGRESS NOTE ADULT - ASSESSMENT
83F w/pmh of COPD not on home 02, HTN, HLD, Afib on Eliquis, CHF, multiple compression fractures and ascending penetrating aortic ulcer presenting with sob.    Likely multifactorial 2/2 CHF exacerbation vs. COPD vs. aspiration pneumonia.  No clear evidence of acute ischemia,    - Remains in Afib with fairly controlled rate.  With no overnight events.  However, HR went up the night before, up to 140's  - Cont tele - AF 80s no tele events last night with MULU to 140s yesterday  - BP on the low side, Toprol and Lasix not given this am per RN  - Will continue Cardizem at same dose pending TTE   - ECHO 3/2016 showed moderate ischemic myopathy, moderate-severe MR, severe dilated left atrium, normal LV, EF 67%. Repeat ECHO here.   - ProBNP elevated remains elevated, however, decreased compared from 4/18 (>15,000 from >25,000).  Will decrease Lasix to 20 daily as respiratory symptoms cannot be fully attributed to HF to allow BP tolerance  Patient has underlying COPD and Pna  - Pt has hx of chronic afib on Eliquis.  CHADsVASc score 5.  Continue Eliquis 2.5 mg BID (with normal renal function, she meets criteria for 5 bid, though continue 2.5 for now given her drop in Hb). Watch Hb  - CT chest with small bilateral effusions with RUL infiltrates  - Monitor and replete lytes, keep K>4, Mg>2  - Other cardiovascular workup will depend on clinical course.  - All other workup per primary team  - Will follow     Lorie Yang NP  Cardiology

## 2018-04-22 NOTE — PROGRESS NOTE ADULT - PROBLEM SELECTOR PLAN 3
abg consistent with hypercapnic   would likely need home 02  will resume stolatis pt home medication, was wheezing on soludmerol, clinically feeling better   pt is dyspnic, would request pulm f/u

## 2018-04-22 NOTE — PROGRESS NOTE ADULT - PROBLEM SELECTOR PLAN 1
-Continue Lasix 20mg IV BID for today as patient continues to be short of breath, hold on switching to PO.   -Patient will need to be evaluated for home O2 if symptoms don't improve but this is likely due to increased pulm vascular congestion. doubt due to infection  -Echo pending  -Continue fluid restriction  lasix,

## 2018-04-23 DIAGNOSIS — N17.9 ACUTE KIDNEY FAILURE, UNSPECIFIED: ICD-10-CM

## 2018-04-23 LAB
ALBUMIN SERPL ELPH-MCNC: 3 G/DL — LOW (ref 3.3–5)
ALP SERPL-CCNC: 98 U/L — SIGNIFICANT CHANGE UP (ref 40–120)
ALT FLD-CCNC: 19 U/L — SIGNIFICANT CHANGE UP (ref 12–78)
ANION GAP SERPL CALC-SCNC: 10 MMOL/L — SIGNIFICANT CHANGE UP (ref 5–17)
AST SERPL-CCNC: 14 U/L — LOW (ref 15–37)
BILIRUB SERPL-MCNC: 0.6 MG/DL — SIGNIFICANT CHANGE UP (ref 0.2–1.2)
BUN SERPL-MCNC: 41 MG/DL — HIGH (ref 7–23)
CALCIUM SERPL-MCNC: 8.6 MG/DL — SIGNIFICANT CHANGE UP (ref 8.5–10.1)
CHLORIDE SERPL-SCNC: 99 MMOL/L — SIGNIFICANT CHANGE UP (ref 96–108)
CO2 SERPL-SCNC: 26 MMOL/L — SIGNIFICANT CHANGE UP (ref 22–31)
CREAT SERPL-MCNC: 1.5 MG/DL — HIGH (ref 0.5–1.3)
CULTURE RESULTS: SIGNIFICANT CHANGE UP
CULTURE RESULTS: SIGNIFICANT CHANGE UP
GLUCOSE SERPL-MCNC: 157 MG/DL — HIGH (ref 70–99)
HCT VFR BLD CALC: 29.2 % — LOW (ref 34.5–45)
HGB BLD-MCNC: 9.7 G/DL — LOW (ref 11.5–15.5)
MCHC RBC-ENTMCNC: 33.2 GM/DL — SIGNIFICANT CHANGE UP (ref 32–36)
MCHC RBC-ENTMCNC: 34.2 PG — HIGH (ref 27–34)
MCV RBC AUTO: 103.1 FL — HIGH (ref 80–100)
PLATELET # BLD AUTO: 346 K/UL — SIGNIFICANT CHANGE UP (ref 150–400)
POTASSIUM SERPL-MCNC: 4.8 MMOL/L — SIGNIFICANT CHANGE UP (ref 3.5–5.3)
POTASSIUM SERPL-SCNC: 4.8 MMOL/L — SIGNIFICANT CHANGE UP (ref 3.5–5.3)
PROT SERPL-MCNC: 6 G/DL — SIGNIFICANT CHANGE UP (ref 6–8.3)
RBC # BLD: 2.83 M/UL — LOW (ref 3.8–5.2)
RBC # FLD: 15.9 % — HIGH (ref 10.3–14.5)
SODIUM SERPL-SCNC: 135 MMOL/L — SIGNIFICANT CHANGE UP (ref 135–145)
SPECIMEN SOURCE: SIGNIFICANT CHANGE UP
SPECIMEN SOURCE: SIGNIFICANT CHANGE UP
WBC # BLD: 9.5 K/UL — SIGNIFICANT CHANGE UP (ref 3.8–10.5)
WBC # FLD AUTO: 9.5 K/UL — SIGNIFICANT CHANGE UP (ref 3.8–10.5)

## 2018-04-23 PROCEDURE — 99233 SBSQ HOSP IP/OBS HIGH 50: CPT | Mod: GC

## 2018-04-23 PROCEDURE — 99232 SBSQ HOSP IP/OBS MODERATE 35: CPT

## 2018-04-23 RX ORDER — LIDOCAINE 4 G/100G
1 CREAM TOPICAL EVERY 24 HOURS
Qty: 0 | Refills: 0 | Status: DISCONTINUED | OUTPATIENT
Start: 2018-04-23 | End: 2018-05-01

## 2018-04-23 RX ADMIN — Medication 0.5 MILLIGRAM(S): at 20:42

## 2018-04-23 RX ADMIN — APIXABAN 2.5 MILLIGRAM(S): 2.5 TABLET, FILM COATED ORAL at 17:22

## 2018-04-23 RX ADMIN — LIDOCAINE 1 PATCH: 4 CREAM TOPICAL at 22:19

## 2018-04-23 RX ADMIN — ALBUTEROL 2.5 MILLIGRAM(S): 90 AEROSOL, METERED ORAL at 15:47

## 2018-04-23 RX ADMIN — Medication 25 MILLIGRAM(S): at 06:10

## 2018-04-23 RX ADMIN — Medication 20 MILLIGRAM(S): at 17:19

## 2018-04-23 RX ADMIN — PANTOPRAZOLE SODIUM 40 MILLIGRAM(S): 20 TABLET, DELAYED RELEASE ORAL at 06:10

## 2018-04-23 RX ADMIN — Medication 20 MILLIGRAM(S): at 06:09

## 2018-04-23 RX ADMIN — ALBUTEROL 2.5 MILLIGRAM(S): 90 AEROSOL, METERED ORAL at 20:43

## 2018-04-23 RX ADMIN — Medication 0.5 MILLIGRAM(S): at 07:38

## 2018-04-23 RX ADMIN — Medication 20 MILLIGRAM(S): at 06:10

## 2018-04-23 RX ADMIN — APIXABAN 2.5 MILLIGRAM(S): 2.5 TABLET, FILM COATED ORAL at 06:09

## 2018-04-23 RX ADMIN — ALBUTEROL 2.5 MILLIGRAM(S): 90 AEROSOL, METERED ORAL at 07:38

## 2018-04-23 RX ADMIN — TIOTROPIUM BROMIDE AND OLODATEROL 2 PUFF(S): 3.124; 2.736 SPRAY, METERED RESPIRATORY (INHALATION) at 06:37

## 2018-04-23 NOTE — PROGRESS NOTE ADULT - PROBLEM SELECTOR PLAN 5
Hgb dropped from 12 to 10.3, now 10.9  No evidence of bleed  iron panel showed elevated ferritin, rest of panel normal, normal b12 and folate  Monitor h/h daily Hgb dropped from 12 to 10.3, now 9.7  Likely secondary to chronic disease  No evidence of bleed  iron panel showed elevated ferritin, rest of panel normal, normal b12 and folate  Monitor h/h daily

## 2018-04-23 NOTE — PROGRESS NOTE ADULT - SUBJECTIVE AND OBJECTIVE BOX
HPI:  83F w/pmh of COPD not on home 02, HTN, HLD, Afib on Eliquis, CHF, multiple compression fractures and ascending penetrating aortic ulcer presenting with sob for 3-4 days. States that she has been having increasing sob for the past few days. States that she also has nausea and regurgitated clear fluids around 3-4 days ago. Pt has felt increasing weakness and lethargy with decreased appetite as well. She has tried nebulizers and inhalers for her sob without relief of her symptoms. Patient is unable to lie down flat due to compression fractures and difficulty breathing. Denies headache, fever, chills, vomiting, cough, chest pain, palpitations, peripheral edema and abdominal pain. No sick contacts. No recent travel. She ambulates with a cane. Patient has a history of spasmodic dysphonia which she was previously getting botox shots for but had an episode of aspiration so she stopped the injections.    In ED, patient was hypotensive with BP of 88/53, improved to 106/60. Remainder of vitals were within normal range. CBC  unremarkable, BMP significant for hyponatremia. ProBNP was 25,370. RVP negative. CT chest ordered and pending. Preliminary read of EKG revealed afib with ventricular rate of 85. CXR showed small right pleural effusion, questionable infiltrate. CT chest revealed small right greater than left pleural effusions. Scattered patchy right upper lobe pulmonary infiltrates new from the prior study. Emphysema. Stable peripherally calcified lesion in the spleen. Patient given Rocephin and zithromax in ED. (2018 17:33)    	  INTERVAL HPI/OVERNIGHT EVENTS:    MEDICATIONS  (STANDING):  ALBUTerol    0.083% 2.5 milliGRAM(s) Nebulizer every 8 hours  apixaban 2.5 milliGRAM(s) Oral every 12 hours  buDESOnide   0.5 milliGRAM(s) Respule 0.5 milliGRAM(s) Inhalation two times a day  diltiazem    Tablet 60 milliGRAM(s) Oral every 6 hours  furosemide    Tablet 20 milliGRAM(s) Oral daily  methylPREDNISolone sodium succinate Injectable 20 milliGRAM(s) IV Push every 8 hours  metoprolol tartrate 25 milliGRAM(s) Oral two times a day  pantoprazole    Tablet 40 milliGRAM(s) Oral before breakfast  tiotropium 2.5 MICROgram(s)/olodaterol 2.5 MICROgram(s) Inhaler 2 Puff(s) Inhalation daily    MEDICATIONS  (PRN):  ALBUTerol    90 MICROgram(s) HFA Inhaler 2 Puff(s) Inhalation every 6 hours PRN Bronchospasm  morphine  - Injectable 0.25 milliGRAM(s) IV Push every 3 hours PRN resp distress, dyspnea, pain,    Allergies    No Known Allergies    Intolerances    PAST MEDICAL & SURGICAL HISTORY:  CHF (congestive heart failure)  Compression fracture: Aug 2015  Carbon monoxide poisonin  Pulmonary emphysema, unspecified emphysema type  Secondary hypertension  Atrial fibrillation, unspecified type  History of arthroscopy of knee  History of appendectomy:   History of right shoulder replacement:     Home Medications:  calcitonin nasal 200 intl units/inh spray: 1 spray(s) nasal in one nostril everyday, alternating each nostril.  (2018 21:25)  Eliquis 2.5 mg oral tablet: 1 tab(s) orally 2 times a day (2018 21:25)  furosemide 20 mg oral tablet: 1 tab(s) orally 2 times a day (2018 21:25)  losartan 50 mg oral tablet: 1 tab(s) orally once a day (2018 16:47)  metoprolol succinate 100 mg oral tablet, extended release: 1 tab(s) orally once a day (2018 21:25)  omeprazole 20 mg oral delayed release capsule: 1 cap(s) orally 2 times a day (2018 21:25)  Praluent Pen 75 mg/mL subcutaneous solution: subcutaneous 2 times a month (2018 21:25)  ProAir HFA 90 mcg/inh inhalation aerosol: Inhale 1 to 2 puffs every 4 to 6 hours as needed (2018 21:25)  Stiolto Respimat 2.5 mcg-2.5 mcg/inh inhalation aerosol: 2 puff(s) inhaled every 24 hours (2018 21:25)    CONSTITUTIONAL: admits generalized weakness which is improving since time of admission, no fevers or chills  EYES/ENT: No visual changes;  No vertigo or throat pain   NECK: admits neck discomfort, denies stiffness  RESPIRATORY: admits to shortness of breath this morning  CARDIOVASCULAR: No chest pain or palpitations  GASTROINTESTINAL: No abdominal pain. No nausea, vomiting, or hematemesis; No diarrhea or constipation. No melena or hematochezia.  GENITOURINARY: No dysuria, frequency or hematuria  NEUROLOGICAL: No numbness or weakness  MSK: Back pain  SKIN: No itching or rash    Vital Signs Last 24 Hrs  T(C): 36.1 (2018 23:38), Max: 36.9 (2018 15:37)  T(F): 97 (2018 23:38), Max: 98.5 (2018 20:40)  HR: 107 (2018 23:38) (80 - 107)  BP: 100/61 (2018 23:38) (95/60 - 103/64)  RR: 17 (2018 23:38) (17 - 19)  SpO2: 99% (2018 23:38) (96% - 99%)     @ 07:01  -   @ 07:00  --------------------------------------------------------  IN: 0 mL / OUT: 200 mL / NET: -200 mL      Physical Exam      LABS:                        10.5   x     )-----------( x        ( 2018 14:38 )             31.1     -    137  |  99  |  23  ----------------------------<  170<H>  4.6   |  27  |  1.20    Ca    8.5      2018 06:02  Mg     1.6           Radiology  XR CHEST PORTABLE URGENT 1V                            PROCEDURE DATE:  2018          INTERPRETATION:  CHF, follow-up.    AP chest. Prior 2018.    Very low lung volumes. No change heart mediastinum. Bibasilar   interstitial edema small hazy effusions consistent with history of CHF.   Question superimposed airspace infiltrate at the right base. Recommend   clinical correlation close follow-up. Remainder study unchanged.    Impression: As above        EXAM:  CT CHEST                            PROCEDURE DATE:  2018          INTERPRETATION:  CLINICAL INFORMATION:  Shortness of breath    PROCEDURE:  Using multislice helical CT, thin sections were obtained from   the thoracic inlet through the lung bases.    COMPARISON: Chest x-ray of earlier in the day and CT chest of 2018    FINDINGS:      There is no significant axillary, hilar, or mediastinal lymphadenopathy.   There is no pericardial effusion. There are small free-flowing pleural   effusions right greater than left There is atherosclerotic calcification   of the aorta and tracheobronchial calcification. There is coronary artery   calcification.    There is emphysema. There is bibasilar atelectasis. There are patchy   areas of infiltrate in the right upper lobe.    The osseous structures demonstrate osteopenia and degenerative change.    The upper abdomen is remarkable for extensive vascular calcifications   with rounded peripherally calcified lesion again identified in the spleen   there are probable small gallstones in the gallbladder.    IMPRESSION: Small right greater than left pleural effusions  Scattered patchy right upper lobe pulmonary infiltrates new from the   prior study  Emphysema  Stable peripherally calcified lesion in the spleen HPI:  83F w/pmh of COPD not on home 02, HTN, HLD, Afib on Eliquis, CHF, multiple compression fractures and ascending penetrating aortic ulcer presenting with sob for 3-4 days. States that she has been having increasing sob for the past few days. States that she also has nausea and regurgitated clear fluids around 3-4 days ago. Pt has felt increasing weakness and lethargy with decreased appetite as well. She has tried nebulizers and inhalers for her sob without relief of her symptoms. Patient is unable to lie down flat due to compression fractures and difficulty breathing. Denies headache, fever, chills, vomiting, cough, chest pain, palpitations, peripheral edema and abdominal pain. No sick contacts. No recent travel. She ambulates with a cane. Patient has a history of spasmodic dysphonia which she was previously getting botox shots for but had an episode of aspiration so she stopped the injections.    In ED, patient was hypotensive with BP of 88/53, improved to 106/60. Remainder of vitals were within normal range. CBC  unremarkable, BMP significant for hyponatremia. ProBNP was 25,370. RVP negative. CT chest ordered and pending. Preliminary read of EKG revealed afib with ventricular rate of 85. CXR showed small right pleural effusion, questionable infiltrate. CT chest revealed small right greater than left pleural effusions. Scattered patchy right upper lobe pulmonary infiltrates new from the prior study. Emphysema. Stable peripherally calcified lesion in the spleen. Patient given Rocephin and zithromax in ED. (2018 17:33)    	  INTERVAL HPI/OVERNIGHT EVENTS: Tele: afib around 88, became SOB yesterday and desat when walking to bathroom.    MEDICATIONS  (STANDING):  ALBUTerol    0.083% 2.5 milliGRAM(s) Nebulizer every 8 hours  apixaban 2.5 milliGRAM(s) Oral every 12 hours  buDESOnide   0.5 milliGRAM(s) Respule 0.5 milliGRAM(s) Inhalation two times a day  diltiazem    Tablet 60 milliGRAM(s) Oral every 6 hours  furosemide    Tablet 20 milliGRAM(s) Oral daily  methylPREDNISolone sodium succinate Injectable 20 milliGRAM(s) IV Push every 8 hours  metoprolol tartrate 25 milliGRAM(s) Oral two times a day  pantoprazole    Tablet 40 milliGRAM(s) Oral before breakfast  tiotropium 2.5 MICROgram(s)/olodaterol 2.5 MICROgram(s) Inhaler 2 Puff(s) Inhalation daily    MEDICATIONS  (PRN):  ALBUTerol    90 MICROgram(s) HFA Inhaler 2 Puff(s) Inhalation every 6 hours PRN Bronchospasm  morphine  - Injectable 0.25 milliGRAM(s) IV Push every 3 hours PRN resp distress, dyspnea, pain,    Allergies    No Known Allergies    Intolerances    PAST MEDICAL & SURGICAL HISTORY:  CHF (congestive heart failure)  Compression fracture: Aug 2015  Carbon monoxide poisonin  Pulmonary emphysema, unspecified emphysema type  Secondary hypertension  Atrial fibrillation, unspecified type  History of arthroscopy of knee  History of appendectomy:   History of right shoulder replacement:     Home Medications:  calcitonin nasal 200 intl units/inh spray: 1 spray(s) nasal in one nostril everyday, alternating each nostril.  (2018 21:25)  Eliquis 2.5 mg oral tablet: 1 tab(s) orally 2 times a day (2018 21:25)  furosemide 20 mg oral tablet: 1 tab(s) orally 2 times a day (2018 21:25)  losartan 50 mg oral tablet: 1 tab(s) orally once a day (2018 16:47)  metoprolol succinate 100 mg oral tablet, extended release: 1 tab(s) orally once a day (2018 21:25)  omeprazole 20 mg oral delayed release capsule: 1 cap(s) orally 2 times a day (2018 21:25)  Praluent Pen 75 mg/mL subcutaneous solution: subcutaneous 2 times a month (2018 21:25)  ProAir HFA 90 mcg/inh inhalation aerosol: Inhale 1 to 2 puffs every 4 to 6 hours as needed (2018 21:25)  Stiolto Respimat 2.5 mcg-2.5 mcg/inh inhalation aerosol: 2 puff(s) inhaled every 24 hours (2018 21:25)    CONSTITUTIONAL: admits generalized weakness which is improving since time of admission, no fevers or chills  EYES/ENT: No visual changes;  No vertigo or throat pain   NECK: admits neck discomfort, denies stiffness  RESPIRATORY: admits to shortness of breath this morning  CARDIOVASCULAR: No chest pain or palpitations  GASTROINTESTINAL: No abdominal pain. No nausea, vomiting, or hematemesis; No diarrhea or constipation. No melena or hematochezia.  GENITOURINARY: No dysuria, frequency or hematuria  NEUROLOGICAL: No numbness or weakness  MSK: Back pain  SKIN: No itching or rash    Vital Signs Last 24 Hrs  T(C): 36.1 (2018 23:38), Max: 36.9 (2018 15:37)  T(F): 97 (2018 23:38), Max: 98.5 (2018 20:40)  HR: 107 (2018 23:38) (80 - 107)  BP: 100/61 (2018 23:38) (95/60 - 103/64)  RR: 17 (2018 23:38) (17 - 19)  SpO2: 99% (2018 23:38) (96% - 99%)     @ 07:01  -   @ 07:00  --------------------------------------------------------  IN: 0 mL / OUT: 200 mL / NET: -200 mL      Physical Exam      LABS:                                 9.7    9.5   )-----------( 346      ( 2018 07:48 )             29.2                    10.5   x     )-----------( x        ( 2018 14:38 )             31.1           135  |  99  |  41<H>  ----------------------------<  157<H>  4.8   |  26  |  1.50<H>    Ca    8.6      2018 07:48    TPro  6.0  /  Alb  3.0<L>  /  TBili  0.6  /  DBili  x   /  AST  14<L>  /  ALT  19  /  AlkPhos  98      137  |  99  |  23  ----------------------------<  170<H>  4.6   |  27  |  1.20    Ca    8.5      2018 06:02  Mg     1.6           Radiology  XR CHEST PORTABLE URGENT 1V                            PROCEDURE DATE:  2018          INTERPRETATION:  CHF, follow-up.    AP chest. Prior 2018.    Very low lung volumes. No change heart mediastinum. Bibasilar   interstitial edema small hazy effusions consistent with history of CHF.   Question superimposed airspace infiltrate at the right base. Recommend   clinical correlation close follow-up. Remainder study unchanged.    Impression: As above        EXAM:  CT CHEST                            PROCEDURE DATE:  2018          INTERPRETATION:  CLINICAL INFORMATION:  Shortness of breath    PROCEDURE:  Using multislice helical CT, thin sections were obtained from   the thoracic inlet through the lung bases.    COMPARISON: Chest x-ray of earlier in the day and CT chest of 2018    FINDINGS:      There is no significant axillary, hilar, or mediastinal lymphadenopathy.   There is no pericardial effusion. There are small free-flowing pleural   effusions right greater than left There is atherosclerotic calcification   of the aorta and tracheobronchial calcification. There is coronary artery   calcification.    There is emphysema. There is bibasilar atelectasis. There are patchy   areas of infiltrate in the right upper lobe.    The osseous structures demonstrate osteopenia and degenerative change.    The upper abdomen is remarkable for extensive vascular calcifications   with rounded peripherally calcified lesion again identified in the spleen   there are probable small gallstones in the gallbladder.    IMPRESSION: Small right greater than left pleural effusions  Scattered patchy right upper lobe pulmonary infiltrates new from the   prior study  Emphysema  Stable peripherally calcified lesion in the spleen HPI:  83F w/pmh of COPD not on home 02, HTN, HLD, Afib on Eliquis, CHF, multiple compression fractures and ascending penetrating aortic ulcer presenting with sob for 3-4 days. States that she has been having increasing sob for the past few days. States that she also has nausea and regurgitated clear fluids around 3-4 days ago. Pt has felt increasing weakness and lethargy with decreased appetite as well. She has tried nebulizers and inhalers for her sob without relief of her symptoms. Patient is unable to lie down flat due to compression fractures and difficulty breathing. Denies headache, fever, chills, vomiting, cough, chest pain, palpitations, peripheral edema and abdominal pain. No sick contacts. No recent travel. She ambulates with a cane. Patient has a history of spasmodic dysphonia which she was previously getting botox shots for but had an episode of aspiration so she stopped the injections.    In ED, patient was hypotensive with BP of 88/53, improved to 106/60. Remainder of vitals were within normal range. CBC  unremarkable, BMP significant for hyponatremia. ProBNP was 25,370. RVP negative. CT chest ordered and pending. Preliminary read of EKG revealed afib with ventricular rate of 85. CXR showed small right pleural effusion, questionable infiltrate. CT chest revealed small right greater than left pleural effusions. Scattered patchy right upper lobe pulmonary infiltrates new from the prior study. Emphysema. Stable peripherally calcified lesion in the spleen. Patient given Rocephin and zithromax in ED. (2018 17:33)    	  INTERVAL HPI/OVERNIGHT EVENTS: Tele: Patient seen and examined at bedside. Patient states she becomes short of breath when she has to ambulate to bathroom. Patient denies any chest pain or palpitations.  MEDICATIONS  (STANDING):  ALBUTerol    0.083% 2.5 milliGRAM(s) Nebulizer every 8 hours  apixaban 2.5 milliGRAM(s) Oral every 12 hours  buDESOnide   0.5 milliGRAM(s) Respule 0.5 milliGRAM(s) Inhalation two times a day  diltiazem    Tablet 60 milliGRAM(s) Oral every 6 hours  furosemide    Tablet 20 milliGRAM(s) Oral daily  methylPREDNISolone sodium succinate Injectable 20 milliGRAM(s) IV Push every 8 hours  metoprolol tartrate 25 milliGRAM(s) Oral two times a day  pantoprazole    Tablet 40 milliGRAM(s) Oral before breakfast  tiotropium 2.5 MICROgram(s)/olodaterol 2.5 MICROgram(s) Inhaler 2 Puff(s) Inhalation daily    MEDICATIONS  (PRN):  ALBUTerol    90 MICROgram(s) HFA Inhaler 2 Puff(s) Inhalation every 6 hours PRN Bronchospasm  morphine  - Injectable 0.25 milliGRAM(s) IV Push every 3 hours PRN resp distress, dyspnea, pain,    Allergies    No Known Allergies    Intolerances    PAST MEDICAL & SURGICAL HISTORY:  CHF (congestive heart failure)  Compression fracture: Aug 2015  Carbon monoxide poisonin  Pulmonary emphysema, unspecified emphysema type  Secondary hypertension  Atrial fibrillation, unspecified type  History of arthroscopy of knee  History of appendectomy:   History of right shoulder replacement:     Home Medications:  calcitonin nasal 200 intl units/inh spray: 1 spray(s) nasal in one nostril everyday, alternating each nostril.  (2018 21:25)  Eliquis 2.5 mg oral tablet: 1 tab(s) orally 2 times a day (2018 21:25)  furosemide 20 mg oral tablet: 1 tab(s) orally 2 times a day (2018 21:25)  losartan 50 mg oral tablet: 1 tab(s) orally once a day (2018 16:47)  metoprolol succinate 100 mg oral tablet, extended release: 1 tab(s) orally once a day (2018 21:25)  omeprazole 20 mg oral delayed release capsule: 1 cap(s) orally 2 times a day (2018 21:25)  Praluent Pen 75 mg/mL subcutaneous solution: subcutaneous 2 times a month (2018 21:25)  ProAir HFA 90 mcg/inh inhalation aerosol: Inhale 1 to 2 puffs every 4 to 6 hours as needed (2018 21:25)  Stiolto Respimat 2.5 mcg-2.5 mcg/inh inhalation aerosol: 2 puff(s) inhaled every 24 hours (2018 21:25)    CONSTITUTIONAL: admits generalized weakness which is improving since time of admission, no fevers or chills  EYES/ENT: No visual changes;  No vertigo or throat pain   NECK: admits neck discomfort, denies stiffness  RESPIRATORY: admits to shortness of breath this morning  CARDIOVASCULAR: No chest pain or palpitations  GASTROINTESTINAL: No abdominal pain. No nausea, vomiting, or hematemesis; No diarrhea or constipation. No melena or hematochezia.  GENITOURINARY: No dysuria, frequency or hematuria  NEUROLOGICAL: No numbness or weakness  MSK: Back pain  SKIN: No itching or rash    Vital Signs Last 24 Hrs  T(C): 36.1 (2018 23:38), Max: 36.9 (2018 15:37)  T(F): 97 (2018 23:38), Max: 98.5 (2018 20:40)  HR: 107 (2018 23:38) (80 - 107)  BP: 100/61 (2018 23:38) (95/60 - 103/64)  RR: 17 (2018 23:38) (17 - 19)  SpO2: 99% (2018 23:38) (96% - 99%)     @ 07:01  -  04-22 @ 07:00  --------------------------------------------------------  IN: 0 mL / OUT: 200 mL / NET: -200 mL      Physical Exam  Constitutional: Elderly, frail female in NAD  HEENT: Normocephalic, Atraumatic, moist mucous membranes  Pulmonary: Decreased breath sounds at base of lungs, No wheezing, rales or rhonchi, speaking in full sentences, no use of accessory muscles  Cardiovascular: irregularly irregular.  S1 and S2 positive.    Gastrointestinal: soft, nontender, nondistended, no guarding, no rebound  : No CVA tenderness  Lymph: No peripheral edema. No cervical lymphadenopathy.  Neurological: Alert, follows commands, intact motor and sensation  MSK: No joint edema or erythema, Kyphosis noted in T spine  Skin: No rashes or ulcers    LABS:                                 9.7    9.5   )-----------( 346      ( 2018 07:48 )             29.2                    10.5   x     )-----------( x        ( 2018 14:38 )             31.1           135  |  99  |  41<H>  ----------------------------<  157<H>  4.8   |  26  |  1.50<H>    Ca    8.6      2018 07:48    TPro  6.0  /  Alb  3.0<L>  /  TBili  0.6  /  DBili  x   /  AST  14<L>  /  ALT  19  /  AlkPhos  98      135  |  99  |  41<H>  ----------------------------<  157<H>  4.8   |  26  |  1.50<H>    Ca    8.6      2018 07:48    TPro  6.0  /  Alb  3.0<L>  /  TBili  0.6  /  DBili  x   /  AST  14<L>  /  ALT  19  /  AlkPhos  98      Radiology  XR CHEST PORTABLE URGENT 1V                            PROCEDURE DATE:  2018          INTERPRETATION:  CHF, follow-up.    AP chest. Prior 2018.    Very low lung volumes. No change heart mediastinum. Bibasilar   interstitial edema small hazy effusions consistent with history of CHF.   Question superimposed airspace infiltrate at the right base. Recommend   clinical correlation close follow-up. Remainder study unchanged.    Impression: As above        EXAM:  CT CHEST                            PROCEDURE DATE:  2018          INTERPRETATION:  CLINICAL INFORMATION:  Shortness of breath    PROCEDURE:  Using multislice helical CT, thin sections were obtained from   the thoracic inlet through the lung bases.    COMPARISON: Chest x-ray of earlier in the day and CT chest of 2018    FINDINGS:      There is no significant axillary, hilar, or mediastinal lymphadenopathy.   There is no pericardial effusion. There are small free-flowing pleural   effusions right greater than left There is atherosclerotic calcification   of the aorta and tracheobronchial calcification. There is coronary artery   calcification.    There is emphysema. There is bibasilar atelectasis. There are patchy   areas of infiltrate in the right upper lobe.    The osseous structures demonstrate osteopenia and degenerative change.    The upper abdomen is remarkable for extensive vascular calcifications   with rounded peripherally calcified lesion again identified in the spleen   there are probable small gallstones in the gallbladder.    IMPRESSION: Small right greater than left pleural effusions  Scattered patchy right upper lobe pulmonary infiltrates new from the   prior study  Emphysema  Stable peripherally calcified lesion in the spleen

## 2018-04-23 NOTE — PROGRESS NOTE ADULT - PROBLEM SELECTOR PLAN 7
Continue metoprolol 25mg daily with low BP, continue cardizem 60 TID  [id number] score of 5, continue anticoagulation with Eliquis 2.5 BID, she meets criteria for 5 bid, though continue 2.5 for now given her drop in Hb). Watch Hb Continue metoprolol 25mg daily with low BP, continue cardizem 60 TID for today, switch to cardizem 240 daily tomorrow  [id number] score of 5, continue anticoagulation with Eliquis 2.5 BID, she meets criteria for 5 bid, though continue 2.5 for now given her drop in Hb). Watch Hb

## 2018-04-23 NOTE — PROGRESS NOTE ADULT - ASSESSMENT
83F w/pmh of COPD not on home 02, HTN, HLD, Afib on Eliquis, CHF, multiple compression fractures and ascending penetrating aortic ulcer presenting with sob.    Likely multifactorial 2/2 CHF exacerbation vs. COPD vs. aspiration pneumonia.  No clear evidence of acute ischemia,    - no acute ischemia  - Remains in Afib with controlled rate.   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 Repeat tte from yesterday is similar   - Pt has hx of chronic afib on Eliquis.  CHADsVASc score 5.  Continue Eliquis 2.5 mg BID (with normal renal function, she meets criteria for 5 bid, though continue 2.5 for now given her drop in Hb). Watch Hb  - CT chest with small bilateral effusions with RUL infiltrates  - Monitor and replete lytes, keep K>4, Mg>2  - Other cardiovascular workup will depend on clinical course.  - All other workup per primary team  - Will follow

## 2018-04-23 NOTE — PROGRESS NOTE ADULT - PROBLEM SELECTOR PLAN 4
Resolved Resolved  But now with DENISE: Likely secondary to Lasix, will discuss holding lasix Likely secondary to lasix use  Recently switched from IV to PO lasix  Monitor Cr for improvement

## 2018-04-23 NOTE — PROGRESS NOTE ADULT - SUBJECTIVE AND OBJECTIVE BOX
[facility]    Daniella Kenyon, Nola, Ghanshyam, Leon, Jhonatan, Sally      [phone]    CHIEF COMPLAINT: Patient is a 83y old  Female who presents with a chief complaint of shortness of breath (20 Apr 2018 14:05)      Follow Up: af, sob, MR    Interim history: reports feeling "better", denies cp, improved sob    MEDICATIONS  (STANDING):  ALBUTerol    0.083% 2.5 milliGRAM(s) Nebulizer every 8 hours  apixaban 2.5 milliGRAM(s) Oral every 12 hours  buDESOnide   0.5 milliGRAM(s) Respule 0.5 milliGRAM(s) Inhalation two times a day  diltiazem    Tablet 60 milliGRAM(s) Oral every 6 hours  furosemide    Tablet 20 milliGRAM(s) Oral daily  metoprolol tartrate 25 milliGRAM(s) Oral two times a day  pantoprazole    Tablet 40 milliGRAM(s) Oral before breakfast  predniSONE   Tablet 20 milliGRAM(s) Oral two times a day  tiotropium 2.5 MICROgram(s)/olodaterol 2.5 MICROgram(s) Inhaler 2 Puff(s) Inhalation daily    MEDICATIONS  (PRN):  ALBUTerol    90 MICROgram(s) HFA Inhaler 2 Puff(s) Inhalation every 6 hours PRN Bronchospasm  morphine  - Injectable 0.25 milliGRAM(s) IV Push every 3 hours PRN resp distress, dyspnea, pain,      REVIEW OF SYSTEMS:  eye, ent, GI, , allergic, dermatologic, musculoskeletal and neurologic are negative except as described above    Vital Signs Last 24 Hrs  T(C): 37.5 (23 Apr 2018 08:30), Max: 37.5 (23 Apr 2018 08:30)  T(F): 99.5 (23 Apr 2018 08:30), Max: 99.5 (23 Apr 2018 08:30)  HR: 83 (23 Apr 2018 08:30) (79 - 107)  BP: 101/73 (23 Apr 2018 08:30) (95/60 - 109/63)  BP(mean): --  RR: 18 (23 Apr 2018 08:30) (17 - 18)  SpO2: 99% (23 Apr 2018 08:30) (96% - 100%)    I&O's Summary      Telemetry past 24h: af controlled    PHYSICAL EXAM:    Constitutional: well-nourished, well-developed, NAD   HEENT:  MMM, sclerae anicteric, conjunctivae clear, no oral cyanosis.  Pulmonary: Non-labored, breath sounds are decr bilaterally, No wheezing, rales or rhonchi  Cardiovascular: irregular, S1 and S2.  No murmur.  No rubs, gallops or clicks  Gastrointestinal: Bowel Sounds present, soft, nontender.   Lymph: No peripheral edema.   Neurological: Alert, no focal deficits  Skin: No rashes.  Psych:  Mood & affect appropriate    LABS: All Labs Reviewed:                        9.7    9.5   )-----------( 346      ( 23 Apr 2018 07:48 )             29.2                         10.5   x     )-----------( x        ( 22 Apr 2018 14:38 )             31.1                         10.5   x     )-----------( x        ( 22 Apr 2018 04:43 )             31.1     23 Apr 2018 07:48    135    |  99     |  41     ----------------------------<  157    4.8     |  26     |  1.50   21 Apr 2018 06:02    137    |  99     |  23     ----------------------------<  170    4.6     |  27     |  1.20     Ca    8.6        23 Apr 2018 07:48  Ca    8.5        21 Apr 2018 06:02  Mg     1.6       21 Apr 2018 07:01    TPro  6.0    /  Alb  3.0    /  TBili  0.6    /  DBili  x      /  AST  14     /  ALT  19     /  AlkPhos  98     23 Apr 2018 07:48          Blood Culture: Organism --  Gram Stain Blood -- Gram Stain --  Specimen Source .Blood Blood-Peripheral  Culture-Blood --      04-21 @ 07:01  Pro Bnp [id number]    04-21 @ 07:01  TSH: 0.73      RADIOLOGY:    EKG:    Echo:    < from: TTE Echo Doppler w/o Cont (04.22.18 @ 10:50) >     EXAM:  ECHO TTE W/O CON COMP W/DOPPLR         PROCEDURE DATE:  04/22/2018        INTERPRETATION:  INDICATION: Mitral valve disease    Blood Pressure 103/64    Height 152     Weight 46       BSA 1.36    Dimensions:    LA 4.3       Normal Values: 2.0 - 4.0 cm    Ao 3.1        Normal Values: 2.0 - 3.8 cm  SEPTUM 0.7       Normal Values: 0.6 - 1.2 cm  PWT 0.7       Normal Values: 0.6 - 1.1 cm  LVIDd 3.9         Normal Values: 3.0 - 5.6 cm  LVIDs 2.1         Normal Values: 1.8 - 4.0 cm    Derived Variables:  LVMI     g/m2  RWT      Fractional Short      Ejection Fraction 65    Doppler Peak v. AoV=   (m/sec)    OBSERVATIONS:    Mitral Valve: MAC, moderate to severe MR., may be underestimated. There   is superior displacement of the posterior mitral valve leaflet with   systole, without bindu prolapse.  Aortic Valve/Aorta: Calcified trileaflet aortic valve.  Tricuspid Valve: normal with moderate TR.  Pulmonic Valve: normal  Left Atrium: Enlarged  Right Atrium: Enlarged  Left Ventricle: normalLV size and systolic function, estimated LVEF of   65%.  Right Ventricle: The right ventricle is dilated. Systolic function cannot   be accurately assessed.  Pericardium/Pleura: Right pleural effusion, no significant pericardial   effusion.  Pulmonary/RV Pressure: estimated PA systolic pressure of 53 mmHg assuming   an RA pressure of 10 mmHg.  LV Diastolic Function: Indeterminate    Normal left ventricular size and systolic function, estimated LVEF of   65%. The right ventricle appears dilated with indeterminate systolic   function. Diastolic function is indeterminate. I atrial enlargement. The   aortic root is normal in size. Mitral annular calcification. There is   superior displacement of the posterior mitral valve leaflet with systole,   without bindu prolapse. There is at least moderate to severe MR. The   aortic valve is calcified without stenosis or insufficiency. Moderate TR.   Estimated PA systolic pressure is     53 mm Hg, assuming an RA pressure   of 10 mmHg. No significant pericardial effusion. Right pleural effusion                  ANDIE VALLEJO M.D., ATTENDING CARDIOLOGIST  This document has been electronically signed. Apr 23 2018  9:01AM                < end of copied text >

## 2018-04-23 NOTE — PROGRESS NOTE ADULT - PROBLEM SELECTOR PLAN 1
-Continue PO lasix 20 daiy  -Patient will need to be evaluated for home O2 if symptoms don't improve but this is likely due to increased pulm vascular congestion.  -Echo performed but not read  -Continue strict I and O -Continue PO lasix 20 daiy  -Patient will need to be evaluated for home O2 if symptoms don't improve but this is likely due to increased pulm vascular congestion.  -Echo performed similar to prior  -Continue strict I and O

## 2018-04-23 NOTE — PROGRESS NOTE ADULT - SUBJECTIVE AND OBJECTIVE BOX
Date/Time Patient Seen:  		  Referring MD:   Data Reviewed	       Patient is a 83y old  Female who presents with a chief complaint of shortness of breath (2018 14:05)  in bed  seen and examined  vs and meds reviewed      Subjective/HPI     PAST MEDICAL & SURGICAL HISTORY:  CHF (congestive heart failure)  Compression fracture: Aug 2015  Carbon monoxide poisonin  Pulmonary emphysema, unspecified emphysema type  Secondary hypertension  Atrial fibrillation, unspecified type  History of arthroscopy of knee  History of appendectomy:   History of right shoulder replacement:   S/P appy        Medication list         MEDICATIONS  (STANDING):  ALBUTerol    0.083% 2.5 milliGRAM(s) Nebulizer every 8 hours  apixaban 2.5 milliGRAM(s) Oral every 12 hours  buDESOnide   0.5 milliGRAM(s) Respule 0.5 milliGRAM(s) Inhalation two times a day  diltiazem    Tablet 60 milliGRAM(s) Oral every 6 hours  furosemide    Tablet 20 milliGRAM(s) Oral daily  metoprolol tartrate 25 milliGRAM(s) Oral two times a day  pantoprazole    Tablet 40 milliGRAM(s) Oral before breakfast  predniSONE   Tablet 20 milliGRAM(s) Oral two times a day  tiotropium 2.5 MICROgram(s)/olodaterol 2.5 MICROgram(s) Inhaler 2 Puff(s) Inhalation daily    MEDICATIONS  (PRN):  ALBUTerol    90 MICROgram(s) HFA Inhaler 2 Puff(s) Inhalation every 6 hours PRN Bronchospasm  morphine  - Injectable 0.25 milliGRAM(s) IV Push every 3 hours PRN resp distress, dyspnea, pain,         Vitals log        ICU Vital Signs Last 24 Hrs  T(C): 36.8 (2018 05:07), Max: 36.9 (2018 15:37)  T(F): 98.3 (2018 05:07), Max: 98.5 (2018 20:40)  HR: 84 (2018 05:07) (80 - 107)  BP: 109/63 (2018 05:07) (95/60 - 109/63)  BP(mean): --  ABP: --  ABP(mean): --  RR: 17 (2018 05:07) (17 - 19)  SpO2: 100% (2018 05:07) (96% - 100%)           Input and Output:  I&O's Detail      Lab Data                        10.5   x     )-----------( x        ( 2018 14:38 )             31.1                   Review of Systems	      Objective     Physical Examination    head at  heart s1s2  lung dec BS  abd soft      Pertinent Lab findings & Imaging      Elpidio:  NO   Adequate UO     I&O's Detail           Discussed with:     Cultures:	        Radiology

## 2018-04-23 NOTE — GOALS OF CARE CONVERSATION - PERSONAL ADVANCE DIRECTIVE - NS PRO AD PATIENT TYPE ON CHART
Health Care Proxy (HCP)/Do Not Resuscitate (DNR)/Medical Orders for Life-Sustaining Treatment (MOLST) Medical Orders for Life-Sustaining Treatment (MOLST)/Health Care Proxy (HCP)/Do Not Resuscitate (DNR)/molst updated 4/30/18

## 2018-04-23 NOTE — GOALS OF CARE CONVERSATION - PERSONAL ADVANCE DIRECTIVE - CONVERSATION DETAILS
met pt w sister, Sandy, hcp and daughter, alt hcp, spoke of pt wishes, molst reviewed: pt agrees dnr dni no decision regarding  ADRI, to further discuss when circumstances arise, wants treatment IV flds, antibiotics. family support pt decisions. Dr Saez to complete molst form. order received. met pt w sisterSandy, hcp and daughter, alt hcp, spoke of pt wishes, molst reviewed: pt agrees dnr dni no decision regarding  ADRI, to further discuss when circumstances arise, wants treatment IV flds, antibiotics. family support pt decisions. Dr Saez to complete molst form. order received.      addendum: 4/30/18: 1715 hrs. pt daughter, Daily, contacted me regarding decision made by pt for comfort care, pt wants morphine for resp distress, wean off hi flow O2.  visited pt w daughter & her sister: molst reviewed, dnr dni comfort care, no labs no xrays, no cardiac monitor. Pt wants meds for symptom management. pt knows she may die while in hospital. hope for hospice eval for further decisions. Dr Geller contacted, aware of pt, family decision for comfort care, await orders. Dr Wiggins contacted , aware of same. Dr Aquino aware. Dr Wiggins to complete molst form. no RRT no Mews. support given to pt & family. PC will follow.

## 2018-04-23 NOTE — GOALS OF CARE CONVERSATION - PERSONAL ADVANCE DIRECTIVE - TREATMENT GUIDELINES
DNI/IV fluid trial/DNR Order/Antibiotic trial No artificial nutrition/Antibiotic trial/Do not re-hospitalize/No blood draws/IV fluid trial/DNI/DNR Order/Comfort measures only

## 2018-04-23 NOTE — PROGRESS NOTE ADULT - PROBLEM SELECTOR PLAN 1
copd  copd ex  Heart failure  lasix, I and O, monitor labs, replete lytes, overall better  am labs pending  cont NEBS and Inhaler regimen, o2 support, off the BIPAP, keep Sat > 88 pct  will taper Systemic Steroids this am  discussed with daughter

## 2018-04-23 NOTE — PROGRESS NOTE ADULT - PROBLEM SELECTOR PLAN 3
ABG consistent with hypercapnic (co2 51, o2 78)  Assess need for home O2  Continue tiotroprium, albuterol, solumedrol 20 q 8, budesonide  would request pulm f/u ABG consistent with hypercapnic (co2 51, o2 78)  Assess need for home O2  Continue tiotroprium, albuterol, budesonide  D.C solumedrol, started on prednisone 20 BID

## 2018-04-24 DIAGNOSIS — J96.01 ACUTE RESPIRATORY FAILURE WITH HYPOXIA: ICD-10-CM

## 2018-04-24 DIAGNOSIS — F43.22 ADJUSTMENT DISORDER WITH ANXIETY: ICD-10-CM

## 2018-04-24 LAB
ANION GAP SERPL CALC-SCNC: 8 MMOL/L — SIGNIFICANT CHANGE UP (ref 5–17)
BUN SERPL-MCNC: 44 MG/DL — HIGH (ref 7–23)
CALCIUM SERPL-MCNC: 8.8 MG/DL — SIGNIFICANT CHANGE UP (ref 8.5–10.1)
CHLORIDE SERPL-SCNC: 98 MMOL/L — SIGNIFICANT CHANGE UP (ref 96–108)
CO2 SERPL-SCNC: 28 MMOL/L — SIGNIFICANT CHANGE UP (ref 22–31)
CREAT SERPL-MCNC: 1.7 MG/DL — HIGH (ref 0.5–1.3)
GLUCOSE SERPL-MCNC: 150 MG/DL — HIGH (ref 70–99)
HCT VFR BLD CALC: 29.8 % — LOW (ref 34.5–45)
HGB BLD-MCNC: 9.7 G/DL — LOW (ref 11.5–15.5)
MCHC RBC-ENTMCNC: 32.5 GM/DL — SIGNIFICANT CHANGE UP (ref 32–36)
MCHC RBC-ENTMCNC: 34.2 PG — HIGH (ref 27–34)
MCV RBC AUTO: 105.3 FL — HIGH (ref 80–100)
PLATELET # BLD AUTO: 330 K/UL — SIGNIFICANT CHANGE UP (ref 150–400)
POTASSIUM SERPL-MCNC: 4.9 MMOL/L — SIGNIFICANT CHANGE UP (ref 3.5–5.3)
POTASSIUM SERPL-SCNC: 4.9 MMOL/L — SIGNIFICANT CHANGE UP (ref 3.5–5.3)
RBC # BLD: 2.83 M/UL — LOW (ref 3.8–5.2)
RBC # FLD: 15.6 % — HIGH (ref 10.3–14.5)
SODIUM SERPL-SCNC: 134 MMOL/L — LOW (ref 135–145)
WBC # BLD: 9 K/UL — SIGNIFICANT CHANGE UP (ref 3.8–10.5)
WBC # FLD AUTO: 9 K/UL — SIGNIFICANT CHANGE UP (ref 3.8–10.5)

## 2018-04-24 PROCEDURE — 99233 SBSQ HOSP IP/OBS HIGH 50: CPT

## 2018-04-24 PROCEDURE — 99233 SBSQ HOSP IP/OBS HIGH 50: CPT | Mod: GC

## 2018-04-24 RX ORDER — ALPRAZOLAM 0.25 MG
0.25 TABLET ORAL
Qty: 0 | Refills: 0 | Status: DISCONTINUED | OUTPATIENT
Start: 2018-04-24 | End: 2018-04-25

## 2018-04-24 RX ORDER — MIRTAZAPINE 45 MG/1
7.5 TABLET, ORALLY DISINTEGRATING ORAL AT BEDTIME
Qty: 0 | Refills: 0 | Status: DISCONTINUED | OUTPATIENT
Start: 2018-04-24 | End: 2018-04-27

## 2018-04-24 RX ORDER — ALPRAZOLAM 0.25 MG
0.25 TABLET ORAL ONCE
Qty: 0 | Refills: 0 | Status: DISCONTINUED | OUTPATIENT
Start: 2018-04-24 | End: 2018-04-24

## 2018-04-24 RX ORDER — IPRATROPIUM/ALBUTEROL SULFATE 18-103MCG
3 AEROSOL WITH ADAPTER (GRAM) INHALATION ONCE
Qty: 0 | Refills: 0 | Status: COMPLETED | OUTPATIENT
Start: 2018-04-24 | End: 2018-04-24

## 2018-04-24 RX ORDER — ACETAMINOPHEN 500 MG
650 TABLET ORAL EVERY 6 HOURS
Qty: 0 | Refills: 0 | Status: DISCONTINUED | OUTPATIENT
Start: 2018-04-24 | End: 2018-05-01

## 2018-04-24 RX ADMIN — PANTOPRAZOLE SODIUM 40 MILLIGRAM(S): 20 TABLET, DELAYED RELEASE ORAL at 05:55

## 2018-04-24 RX ADMIN — ALBUTEROL 2.5 MILLIGRAM(S): 90 AEROSOL, METERED ORAL at 19:35

## 2018-04-24 RX ADMIN — Medication 3 MILLILITER(S): at 03:09

## 2018-04-24 RX ADMIN — TIOTROPIUM BROMIDE AND OLODATEROL 2 PUFF(S): 3.124; 2.736 SPRAY, METERED RESPIRATORY (INHALATION) at 06:00

## 2018-04-24 RX ADMIN — APIXABAN 2.5 MILLIGRAM(S): 2.5 TABLET, FILM COATED ORAL at 17:20

## 2018-04-24 RX ADMIN — ALBUTEROL 2.5 MILLIGRAM(S): 90 AEROSOL, METERED ORAL at 07:45

## 2018-04-24 RX ADMIN — Medication 20 MILLIGRAM(S): at 05:55

## 2018-04-24 RX ADMIN — MIRTAZAPINE 7.5 MILLIGRAM(S): 45 TABLET, ORALLY DISINTEGRATING ORAL at 21:39

## 2018-04-24 RX ADMIN — Medication 650 MILLIGRAM(S): at 01:54

## 2018-04-24 RX ADMIN — Medication 0.25 MILLIGRAM(S): at 12:18

## 2018-04-24 RX ADMIN — Medication 0.5 MILLIGRAM(S): at 07:45

## 2018-04-24 RX ADMIN — Medication 0.5 MILLIGRAM(S): at 19:35

## 2018-04-24 RX ADMIN — APIXABAN 2.5 MILLIGRAM(S): 2.5 TABLET, FILM COATED ORAL at 05:55

## 2018-04-24 RX ADMIN — Medication 650 MILLIGRAM(S): at 00:54

## 2018-04-24 RX ADMIN — Medication 20 MILLIGRAM(S): at 17:20

## 2018-04-24 RX ADMIN — Medication 25 MILLIGRAM(S): at 05:55

## 2018-04-24 RX ADMIN — Medication 0.25 MILLIGRAM(S): at 23:24

## 2018-04-24 NOTE — PROGRESS NOTE ADULT - PROBLEM SELECTOR PLAN 3
RVR overnight, given stat dose of cardizem 60  Continue metoprolol 25mg daily with low BP, continue cardizem 60 TID for today, pending cardio recs  [id number] score of 5, continue anticoagulation with Eliquis 2.5 BID, she meets criteria for 5 bid, though continue 2.5 for now given her drop in Hb). Watch Hb RVR overnight, given stat dose of cardizem 60  Blood pressure has not been able to tolerate cardizem and metoprolol, decrease the hold parameters. If BP continues to be low, will consider switching to digoxin  Continue metoprolol 25mg daily with low BP, continue cardizem 60 QID for now  -[id number] score of 5, continue anticoagulation with Eliquis 2.5 BID, she meets criteria for 5 bid, though continue 2.5 for now given her drop in Hb). Watch Hb

## 2018-04-24 NOTE — CHART NOTE - NSCHARTNOTEFT_GEN_A_CORE
Resident Rapid Response Note    Patient is a 83y old  Female who presents with a chief complaint of shortness of breath (20 Apr 2018 14:05)      Rapid response was called at on this 83y Female patient for  hypoxia with O2 sats in 60s on NC and in Afib with RVR. Patient is 83F w/ pmh of COPD not on home 02, HTN, HLD, Afib on Eliquis, CHF, multiple compression fractures and ascending penetrating aortic ulcer presenting with sob for 3-4 days, admitted for CHF exacerbation with underlying copd. Patient has standing BIPAP order, but has not used it in several nights because she does not like it. Patient complaining of SOB. Denied chest pain, dizziness, confusion.     Patient was seen and examined at the bedside by the rapid response team and primary team. Dr. Reyes at bedside.    Rapid Response Vital Signs:  BP: 146/92  HR: 150  RR: 26  SpO2: 66 % on nc  Temp:  FS: 181    Vital Signs Last 24 Hrs  T(C): 36.6 (23 Apr 2018 23:41), Max: 37.5 (23 Apr 2018 08:30)  T(F): 97.9 (23 Apr 2018 23:41), Max: 99.5 (23 Apr 2018 08:30)  HR: 130 (24 Apr 2018 03:09) (79 - 130)  BP: 95/60 (23 Apr 2018 23:41) (95/60 - 109/63)  BP(mean): --  RR: 18 (23 Apr 2018 23:41) (17 - 18)  SpO2: 92% (24 Apr 2018 03:09) (92% - 100%)    Physical Exam:  General: in respiratory distress  HEENT: NCAT, PERRLA, EOMI bl, moist mucous membranes   Neurology: A&Ox4, nonfocal, CN II-XII grossly intact, sensation intact  Respiratory: Decreased breath sounds in bilateral bases  CV: tachycardic, irregular rhythm. No murmurs  Abdominal: Soft, NT, ND +BSx4  Extremities: No C/C/E, + peripheral pulses  MSK: Normal ROM, no joint erythema or warmth, no joint swelling   Skin: warm, dry, normal color, no rash or abnormal lesions    LABS:                        9.7    9.5   )-----------( 346      ( 23 Apr 2018 07:48 )             29.2     23 Apr 2018 07:48    135    |  99     |  41     ----------------------------<  157    4.8     |  26     |  1.50     Ca    8.6        23 Apr 2018 07:48    TPro  6.0    /  Alb  3.0    /  TBili  0.6    /  DBili  x      /  AST  14     /  ALT  19     /  AlkPhos  98     23 Apr 2018 07:48        CAPILLARY BLOOD GLUCOSE      POCT Blood Glucose.: 181 mg/dL (24 Apr 2018 02:49)          Assessment/Plan: 83F w/pmh of COPD not on home 02, HTN, HLD, Afib on Eliquis, CHF, multiple compression fractures and ascending penetrating aortic ulcer presenting with sob for 3-4 days, admitted for CHF exacerbation with underlying copd with RR called for hypoxia and afib with RVR (HR in 140s).  Likely 2/2 to COPD vs. CHF exacerbation   Patient placed on BIPAP with rapid improvement of O2 sats to low 90s  Duonebs given   patient on diltiazem 60 mg po q6, given stat dose of po 60 mg   -Dr. Reyes, attending hospitalist, and ICU PA aware and agree with plan  -Will continue to follow, RN to call if any changes.    Dr. Rodriguez  PGY-1 [phone]

## 2018-04-24 NOTE — PROGRESS NOTE ADULT - PROBLEM SELECTOR PLAN 1
resp distress, multifactorial, copd, emphysema, pulm HTN  suspect there may be an infectious component  ct chest reviewed, TTE reviewed,   overnight events reviewed, RRT called, pt is currently on BIPAP  pt is DNR DNI, prognosis guarded to poor, discussed prognosis with daughter  will need to discuss with PMD consideration of ABX in this patient and setting  am labs pending resp distress, multifactorial, copd, emphysema, pulm HTN  suspect there may be an infectious component  ct chest reviewed, TTE reviewed,   overnight events reviewed, RRT called, pt is currently on BIPAP  pt is DNR DNI, prognosis guarded to poor, discussed prognosis with daughter  will need to discuss with PMD consideration of ABX in this patient and setting  am labs pending  cont nebs, steroids for COPD  cont cvs regimen and diuresis for Pulm HTN

## 2018-04-24 NOTE — PROGRESS NOTE ADULT - PROBLEM SELECTOR PLAN 2
-Continue PO lasix 20 daiy  -Patient will need to be evaluated for home O2 if symptoms don't improve but this is likely due to increased pulm vascular congestion.  -Echo performed similar to prior  -Continue strict I and O -Dicontinued PO lasix 20, patient with DENISE  -Patient will need to be evaluated for home O2 if symptoms don't improve but this is likely due to increased pulm vascular congestion.  -Echo performed similar to prior  -Continue strict I and O

## 2018-04-24 NOTE — BEHAVIORAL HEALTH ASSESSMENT NOTE - DESCRIPTION
COPD not on home 02, HTN, HLD, Afib on Eliquis, CHF, multiple compression fractures and ascending penetrating aortic ulcer

## 2018-04-24 NOTE — PROGRESS NOTE ADULT - SUBJECTIVE AND OBJECTIVE BOX
Date/Time Patient Seen:  		  Referring MD:   Data Reviewed	       Patient is a 83y old  Female who presents with a chief complaint of shortness of breath (2018 14:05)  in bed  seen and examined  overnight events noted  on BIPAP overnight  spoke with daughter this am        Subjective/HPI     PAST MEDICAL & SURGICAL HISTORY:  CHF (congestive heart failure)  Compression fracture: Aug 2015  Carbon monoxide poisonin  Pulmonary emphysema, unspecified emphysema type  Secondary hypertension  Atrial fibrillation, unspecified type  History of arthroscopy of knee  History of appendectomy:   History of right shoulder replacement:   S/P appy        Medication list         MEDICATIONS  (STANDING):  ALBUTerol    0.083% 2.5 milliGRAM(s) Nebulizer every 8 hours  apixaban 2.5 milliGRAM(s) Oral every 12 hours  buDESOnide   0.5 milliGRAM(s) Respule 0.5 milliGRAM(s) Inhalation two times a day  diltiazem    Tablet 60 milliGRAM(s) Oral every 6 hours  furosemide    Tablet 20 milliGRAM(s) Oral daily  metoprolol tartrate 25 milliGRAM(s) Oral two times a day  pantoprazole    Tablet 40 milliGRAM(s) Oral before breakfast  predniSONE   Tablet 20 milliGRAM(s) Oral two times a day  tiotropium 2.5 MICROgram(s)/olodaterol 2.5 MICROgram(s) Inhaler 2 Puff(s) Inhalation daily    MEDICATIONS  (PRN):  acetaminophen   Tablet. 650 milliGRAM(s) Oral every 6 hours PRN Mild Pain (1 - 3)  ALBUTerol    90 MICROgram(s) HFA Inhaler 2 Puff(s) Inhalation every 6 hours PRN Bronchospasm  lidocaine   Patch 1 Patch Transdermal every 24 hours PRN right shoulder pain  morphine  - Injectable 0.25 milliGRAM(s) IV Push every 3 hours PRN resp distress, dyspnea, pain,         Vitals log        ICU Vital Signs Last 24 Hrs  T(C): 36.5 (2018 04:09), Max: 37.5 (2018 08:30)  T(F): 97.7 (2018 04:09), Max: 99.5 (2018 08:30)  HR: 96 (2018 05:53) (79 - 130)  BP: 109/72 (2018 05:53) (95/60 - 117/78)  BP(mean): --  ABP: --  ABP(mean): --  RR: 19 (2018 04:09) (17 - 19)  SpO2: 100% (2018 04:09) (92% - 100%)           Input and Output:  I&O's Detail    2018 07:01  -  2018 06:46  --------------------------------------------------------  IN:  Total IN: 0 mL    OUT:    Voided: 500 mL  Total OUT: 500 mL    Total NET: -500 mL          Lab Data                        9.7    9.5   )-----------( 346      ( 2018 07:48 )             29.2     04-    135  |  99  |  41<H>  ----------------------------<  157<H>  4.8   |  26  |  1.50<H>    Ca    8.6      2018 07:48    TPro  6.0  /  Alb  3.0<L>  /  TBili  0.6  /  DBili  x   /  AST  14<L>  /  ALT  19  /  AlkPhos  98  04-23            Review of Systems	      Objective     Physical Examination    frail  weak  lung dec BS  abd soft      Pertinent Lab findings & Imaging      Elpidio:  NO   Adequate UO     I&O's Detail    2018 07:01  -  2018 06:46  --------------------------------------------------------  IN:  Total IN: 0 mL    OUT:    Voided: 500 mL  Total OUT: 500 mL    Total NET: -500 mL               Discussed with:     Cultures:	        Radiology

## 2018-04-24 NOTE — PROGRESS NOTE ADULT - SUBJECTIVE AND OBJECTIVE BOX
HPI:  83F w/pmh of COPD not on home 02, HTN, HLD, Afib on Eliquis, CHF, multiple compression fractures and ascending penetrating aortic ulcer presenting with sob for 3-4 days. States that she has been having increasing sob for the past few days. States that she also has nausea and regurgitated clear fluids around 3-4 days ago. Pt has felt increasing weakness and lethargy with decreased appetite as well. She has tried nebulizers and inhalers for her sob without relief of her symptoms. Patient is unable to lie down flat due to compression fractures and difficulty breathing. Denies headache, fever, chills, vomiting, cough, chest pain, palpitations, peripheral edema and abdominal pain. No sick contacts. No recent travel. She ambulates with a cane. Patient has a history of spasmodic dysphonia which she was previously getting botox shots for but had an episode of aspiration so she stopped the injections.    In ED, patient was hypotensive with BP of 88/53, improved to 106/60. Remainder of vitals were within normal range. CBC  unremarkable, BMP significant for hyponatremia. ProBNP was 25,370. RVP negative. CT chest ordered and pending. Preliminary read of EKG revealed afib with ventricular rate of 85. CXR showed small right pleural effusion, questionable infiltrate. CT chest revealed small right greater than left pleural effusions. Scattered patchy right upper lobe pulmonary infiltrates new from the prior study. Emphysema. Stable peripherally calcified lesion in the spleen. Patient given Rocephin and zithromax in ED. (2018 17:33)      SUBJECTIVE:  Patient is a 83y old  Female who presents with a chief complaint of shortness of breath (2018 14:05)          OBJECTIVE:  Review Of Systems:  Constitutional: [ ] Fever [ ] Chills [ ] Fatigue [ ] Weight change   HEENT: [ ] Blurred vision [ ] Eye Pain [ ] Headache [ ] Runny nose [ ] Sore Throat   Respiratory: [ ] Cough [ ] Wheezing [ ] Shortness of breath  Cardiovascular: [ ] Chest Pain [ ] Palpitations [ ] HUFF [ ] PND [ ] Orthopnea  Gastrointestinal: [ ] Abdominal Pain [ ] Diarrhea [ ] Constipation [ ] Hemorrhoids [ ] Nausea [ ] Vomiting  Genitourinary: [ ] Nocturia [ ] Dysuria [ ] Incontinence  Extremities: [ ] Swelling [ ] Joint Pain  Neurologic: [ ] Focal deficit [ ] Paresthesias [ ] Syncope  Lymphatic: [ ] Swelling [ ] Lymphadenopathy   Skin: [ ] Rash [ ] Ecchymoses [ ] Wounds [ ] Lesions  Psychiatry: [ ] Depression [ ] Suicidal/Homicidal Ideation [ ] Anxiety [ ] Sleep Disturbances  [ ] 10 point review of systems is otherwise negative except as mentioned above            [ ]Unable to obtain    Allergy:  Allergies    No Known Allergies    Intolerances        Medications:  MEDICATIONS  (STANDING):  ALBUTerol    0.083% 2.5 milliGRAM(s) Nebulizer every 8 hours  apixaban 2.5 milliGRAM(s) Oral every 12 hours  buDESOnide   0.5 milliGRAM(s) Respule 0.5 milliGRAM(s) Inhalation two times a day  diltiazem    Tablet 60 milliGRAM(s) Oral every 6 hours  furosemide    Tablet 20 milliGRAM(s) Oral daily  metoprolol tartrate 25 milliGRAM(s) Oral two times a day  pantoprazole    Tablet 40 milliGRAM(s) Oral before breakfast  predniSONE   Tablet 20 milliGRAM(s) Oral two times a day  tiotropium 2.5 MICROgram(s)/olodaterol 2.5 MICROgram(s) Inhaler 2 Puff(s) Inhalation daily    MEDICATIONS  (PRN):  acetaminophen   Tablet. 650 milliGRAM(s) Oral every 6 hours PRN Mild Pain (1 - 3)  ALBUTerol    90 MICROgram(s) HFA Inhaler 2 Puff(s) Inhalation every 6 hours PRN Bronchospasm  lidocaine   Patch 1 Patch Transdermal every 24 hours PRN right shoulder pain  morphine  - Injectable 0.25 milliGRAM(s) IV Push every 3 hours PRN resp distress, dyspnea, pain,      PMH/PSH/FH/SH: [ ] Unchanged    Vitals:  T(C): 36.5 (18 @ 04:09), Max: 37.5 (18 @ 08:30)  HR: 120 (18 @ 06:00) (80 - 130)  BP: 109/72 (18 @ 05:53) (95/60 - 117/78)  BP(mean): --  RR: 19 (18 @ 04:09) (17 - 19)  SpO2: 99% (18 @ 06:00) (92% - 100%)  Wt(kg): --  Daily     Daily Weight in k.9 (2018 04:09)  I&O's Summary    2018 07:01  -  2018 07:00  --------------------------------------------------------  IN: 0 mL / OUT: 500 mL / NET: -500 mL        Labs:                        9.7    9.0   )-----------( 330      ( 2018 06:55 )             29.8         134<L>  |  98  |  44<H>  ----------------------------<  150<H>  4.9   |  28  |  1.70<H>    Ca    8.8      2018 06:55    TPro  6.0  /  Alb  3.0<L>  /  TBili  0.6  /  DBili  x   /  AST  14<L>  /  ALT  19  /  AlkPhos  98  04-23      ECG:    Echo:  < from: TTE Echo Doppler w/o Cont (18 @ 10:50) >     EXAM:  ECHO TTE W/O CON COMP W/DOPPLR         PROCEDURE DATE:  2018        INTERPRETATION:  INDICATION: Mitral valve disease    Blood Pressure 103/64    Height 152     Weight 46       BSA 1.36    Dimensions:    LA 4.3       Normal Values: 2.0 - 4.0 cm    Ao 3.1        Normal Values: 2.0 - 3.8 cm  SEPTUM 0.7       Normal Values: 0.6 - 1.2 cm  PWT 0.7       Normal Values: 0.6 - 1.1 cm  LVIDd 3.9         Normal Values: 3.0 - 5.6 cm  LVIDs 2.1         Normal Values: 1.8 - 4.0 cm    Derived Variables:  LVMI     g/m2  RWT      Fractional Short      Ejection Fraction 65    Doppler Peak v. AoV=   (m/sec)    OBSERVATIONS:    Mitral Valve: MAC, moderate to severe MR., may be underestimated. There   is superior displacement of the posterior mitral valve leaflet with   systole, without bindu prolapse.  Aortic Valve/Aorta: Calcified trileaflet aortic valve.  Tricuspid Valve: normal with moderate TR.  Pulmonic Valve: normal  Left Atrium: Enlarged  Right Atrium: Enlarged  Left Ventricle: normalLV size and systolic function, estimated LVEF of   65%.  Right Ventricle: The right ventricle is dilated. Systolic function cannot   be accurately assessed.  Pericardium/Pleura: Right pleural effusion, no significant pericardial   effusion.  Pulmonary/RV Pressure: estimated PA systolic pressure of 53 mmHg assuming   an RA pressure of 10 mmHg.  LV Diastolic Function: Indeterminate    Normal left ventricular size and systolic function, estimated LVEF of   65%. The right ventricle appears dilated with indeterminate systolic   function. Diastolic function is indeterminate. I atrial enlargement. The   aortic root is normal in size. Mitral annular calcification. There is   superior displacement of the posterior mitral valve leaflet with systole,   without bindu prolapse. There is at least moderate to severe MR. The   aortic valve is calcified without stenosis or insufficiency. Moderate TR.   Estimated PA systolic pressure is     53 mm Hg, assuming an RA pressure   of 10 mmHg. No significant pericardial effusion. Right pleural effusion      ANDIE VALLEJO M.D., ATTENDING CARDIOLOGIST  This document has been electronically signed. 2018  9:01AM        < end of copied text >    Stress Testing:     Cath:    Imaging:    Interpretation of Telemetry:      Physical Exam:  Appearance: [ ] Normal  [ ] abnormal [ ] NAD   Eyes: [ ] PERRL [ ] EOMI  HENT: [ ] Normal [ ] Abnormal oral muscosa [ ]NC/AT  Cardiovascular: [ ] S1 [ ] S2 [ ] RRR [ ] m/r/g [ ]edema [ ] JVP  Procedural Access Site: [ ]  hematoma [ ] tender to palpation [ ] 2+ pulse [ ] bruit [ ] Ecchymosis  Respiratory: [ ] Clear to auscultation bilaterally  Gastrointestinal: [ ] Soft [ ] tenderness[ ] distension [ ] BS  Musculoskeletal: [ ] clubbing [ ] joint deformity   Neurologic: [ ] Non-focal  Lymphatic: [ ] lymphadenopathy  Psychiatry: [ ] AAOx3  [ ] confused [ ] disoriented [ ] Mood & affect appropriate  Skin: [ ]  rashes [ ] ecchymoses [ ] cyanosis CARDIOLOGY FOLLOW UP:    SUBJECTIVE:  Patient is a 83y old  Female who presents with a chief complaint of shortness of breath (2018 14:05)    On BIPAP mask at 50%, states she feels better with it but uncomfortable.  Denies CP or palpitations.    OBJECTIVE:  Review Of Systems:  Constitutional: [ ] Fever [ ] Chills [ ] Fatigue [ ] Weight change   HEENT: [ ] Blurred vision [ ] Eye Pain [ ] Headache [ ] Runny nose [ ] Sore Throat   Respiratory: [ ] Cough [ ] Wheezing [ ] Shortness of breath  Cardiovascular: [ ] Chest Pain [ ] Palpitations [ ] HUFF [ ] PND [ ] Orthopnea  Gastrointestinal: [ ] Abdominal Pain [ ] Diarrhea [ ] Constipation [ ] Hemorrhoids [ ] Nausea [ ] Vomiting  Genitourinary: [ ] Nocturia [ ] Dysuria [ ] Incontinence  Extremities: [ ] Swelling [ ] Joint Pain  Neurologic: [ ] Focal deficit [ ] Paresthesias [ ] Syncope  Lymphatic: [ ] Swelling [ ] Lymphadenopathy   Skin: [ ] Rash [ ] Ecchymoses [ ] Wounds [ ] Lesions  Psychiatry: [ ] Depression [ ] Suicidal/Homicidal Ideation [ ] Anxiety [ ] Sleep Disturbances  [ x] 10 point review of systems is otherwise negative except as mentioned above            [ ]Unable to obtain    Allergy:  Allergies    No Known Allergies    Intolerances    Medications:  MEDICATIONS  (STANDING):  ALBUTerol    0.083% 2.5 milliGRAM(s) Nebulizer every 8 hours  apixaban 2.5 milliGRAM(s) Oral every 12 hours  buDESOnide   0.5 milliGRAM(s) Respule 0.5 milliGRAM(s) Inhalation two times a day  diltiazem    Tablet 60 milliGRAM(s) Oral every 6 hours  furosemide    Tablet 20 milliGRAM(s) Oral daily  metoprolol tartrate 25 milliGRAM(s) Oral two times a day  pantoprazole    Tablet 40 milliGRAM(s) Oral before breakfast  predniSONE   Tablet 20 milliGRAM(s) Oral two times a day  tiotropium 2.5 MICROgram(s)/olodaterol 2.5 MICROgram(s) Inhaler 2 Puff(s) Inhalation daily    MEDICATIONS  (PRN):  acetaminophen   Tablet. 650 milliGRAM(s) Oral every 6 hours PRN Mild Pain (1 - 3)  ALBUTerol    90 MICROgram(s) HFA Inhaler 2 Puff(s) Inhalation every 6 hours PRN Bronchospasm  lidocaine   Patch 1 Patch Transdermal every 24 hours PRN right shoulder pain  morphine  - Injectable 0.25 milliGRAM(s) IV Push every 3 hours PRN resp distress, dyspnea, pain,      PMH/PSH/FH/SH: [ ] Unchanged    Vitals:  T(C): 36.5 (18 @ 04:09), Max: 37.5 (18 @ 08:30)  HR: 120 (18 @ 06:00) (80 - 130)  BP: 109/72 (18 @ 05:53) (95/60 - 117/78)  BP(mean): --  RR: 19 (18 @ 04:09) (17 - 19)  SpO2: 99% (18 @ 06:00) (92% - 100%)  Wt(kg): --  Daily     Daily Weight in k.9 (2018 04:09)  I&O's Summary    2018 07:01  -  2018 07:00  --------------------------------------------------------  IN: 0 mL / OUT: 500 mL / NET: -500 mL        Labs:                        9.7    9.0   )-----------( 330      ( 2018 06:55 )             29.8         134<L>  |  98  |  44<H>  ----------------------------<  150<H>  4.9   |  28  |  1.70<H>    Ca    8.8      2018 06:55    TPro  6.0  /  Alb  3.0<L>  /  TBili  0.6  /  DBili  x   /  AST  14<L>  /  ALT  19  /  AlkPhos  98        ECG:    Echo:  < from: TTE Echo Doppler w/o Cont (18 @ 10:50) >     EXAM:  ECHO TTE W/O CON COMP W/DOPPLR         PROCEDURE DATE:  2018        INTERPRETATION:  INDICATION: Mitral valve disease    Blood Pressure 103/64    Height 152     Weight 46       BSA 1.36    Dimensions:    LA 4.3       Normal Values: 2.0 - 4.0 cm    Ao 3.1        Normal Values: 2.0 - 3.8 cm  SEPTUM 0.7       Normal Values: 0.6 - 1.2 cm  PWT 0.7       Normal Values: 0.6 - 1.1 cm  LVIDd 3.9         Normal Values: 3.0 - 5.6 cm  LVIDs 2.1         Normal Values: 1.8 - 4.0 cm    Derived Variables:  LVMI     g/m2  RWT      Fractional Short      Ejection Fraction 65    Doppler Peak v. AoV=   (m/sec)    OBSERVATIONS:    Mitral Valve: MAC, moderate to severe MR., may be underestimated. There   is superior displacement of the posterior mitral valve leaflet with   systole, without bindu prolapse.  Aortic Valve/Aorta: Calcified trileaflet aortic valve.  Tricuspid Valve: normal with moderate TR.  Pulmonic Valve: normal  Left Atrium: Enlarged  Right Atrium: Enlarged  Left Ventricle: normalLV size and systolic function, estimated LVEF of   65%.  Right Ventricle: The right ventricle is dilated. Systolic function cannot   be accurately assessed.  Pericardium/Pleura: Right pleural effusion, no significant pericardial   effusion.  Pulmonary/RV Pressure: estimated PA systolic pressure of 53 mmHg assuming   an RA pressure of 10 mmHg.  LV Diastolic Function: Indeterminate    Normal left ventricular size and systolic function, estimated LVEF of   65%. The right ventricle appears dilated with indeterminate systolic   function. Diastolic function is indeterminate. I atrial enlargement. The   aortic root is normal in size. Mitral annular calcification. There is   superior displacement of the posterior mitral valve leaflet with systole,   without bindu prolapse. There is at least moderate to severe MR. The   aortic valve is calcified without stenosis or insufficiency. Moderate TR.   Estimated PA systolic pressure is     53 mm Hg, assuming an RA pressure   of 10 mmHg. No significant pericardial effusion. Right pleural effusion      ANDIE VALLEJO M.D., ATTENDING CARDIOLOGIST  This document has been electronically signed. 2018  9:01AM        < end of copied text >    Stress Testing:     Cath:    Imaging:    Interpretation of Telemetry:  Afib at 70's with tachycardia overnight.    Physical Exam:  Appearance: [ ] Normal  [ ] abnormal [x ] NAD [x] CAchectic  Eyes: [ ] PERRL [ ] EOMI  HENT: [ ] Normal [ ] Abnormal oral muscosa [ ]NC/AT  Cardiovascular: [ x] S1 [ x] S2 [ ] RRR  [x] Irregularly irregular  [ ] m/r/g [ ]edema [ ] JVP  Procedural Access Site: [ ]  hematoma [ ] tender to palpation [ ] 2+ pulse [ ] bruit [ ] Ecchymosis  Respiratory: [ x] Clear to auscultation bilaterally  [x] Very diminished  Gastrointestinal: [ x] Soft [ ] tenderness[ ] distension [x ] BS  Musculoskeletal: [ ] clubbing [ ] joint deformity   Neurologic: [ x] Non-focal  Lymphatic: [ ] lymphadenopathy  Psychiatry: [ x] AAOx3  [ ] confused [ ] disoriented [ x] Mood & affect appropriate  Skin: [ ]  rashes [ ] ecchymoses [ ] cyanosis

## 2018-04-24 NOTE — CHART NOTE - NSCHARTNOTEFT_GEN_A_CORE
Rapid response 2 hour follow up    RR called on 83y Female patient for  hypoxia with O2 sats in 60s on NC and in Afib with RVR. Patient is 83F w/ pmh of COPD not on home 02, HTN, HLD, Afib on Eliquis, CHF, multiple compression fractures and ascending penetrating aortic ulcer presenting with sob for 3-4 days, admitted for CHF exacerbation with underlying copd. Patient placed on BIPAP, received duoneb treatment and stat 60 mg po dose cardizem during rapid response with improvement in O2 sat. Currently patient sleeping while wearing BIPAP mask comfortably. Patient's daughter at bedside, states her mother recently woke up and they spoke, patient was alert and oriented.    VITAL SIGNS (Last 24 hrs):  T(C): 36.5 (18 @ 04:09), Max: 37.5 (18 @ 08:30)  HR: 125 (18 @ 04:09) (79 - 130)  BP: 117/78 (18 @ 04:09) (95/60 - 117/78)  RR: 19 (18 @ 04:09) (17 - 19)  SpO2: 100% (18 @ 04:09) (92% - 100%)       Daily Weight in k.9 (2018 04:09)    I&O's Summary    2018 07:01  -  2018 05:05  --------------------------------------------------------  IN: 0 mL / OUT: 500 mL / NET: -500 mL    Physical Exam:  General:  No Acute Distress  HEENT: Normocephallic Atraumatic  Neck: Supple, nontender, no mass  Neurology: currently sleeping, per daughter was awake approximately 10 minutes ago, alert and fully oriented  Respiratory: diffuse wheezes  CV: tachycardic, +S1/S2, no murmurs, rubs or gallops  Abdominal: Soft, Non-Tender, Non-Distended +Bowel Soundsx4  Extremities: No Clubbing, cyanosis or edema, + peripheral pulses  Musculoskeletal:  no joint erythema or warmth, no joint swelling   Skin: warm, dry, normal color, no rash or abnormal lesions    A/P  3F w/pmh of COPD not on home 02, HTN, HLD, Afib on Eliquis, CHF, multiple compression fractures and ascending penetrating aortic ulcer presenting with sob for 3-4 days, admitted for CHF exacerbation with underlying copd with RR called for hypoxia and afib with RVR (HR in 140s). Patient on BIPAP, now satting in high 90s, sleeping comfortably. HR between 110-120 on tele monitor.  Continue BIPAP  Continue standing dose cardizem 60 mg po q6  continue po steroids  continue albuterol inhaler and pulmicort   continue to monitor

## 2018-04-24 NOTE — PROGRESS NOTE ADULT - ASSESSMENT
83F w/pmh of COPD not on home 02, HTN, HLD, Afib on Eliquis, CHF, multiple compression fractures and ascending penetrating aortic ulcer presenting with sob.    Likely multifactorial 2/2 CHF exacerbation vs. COPD vs. aspiration pneumonia.  No clear evidence of acute ischemia,    - Patient is a DNR  - s/p RRT last night for sustained O2 Sat of 60's.  Went into MULU at that time up to 130-150's.  Placed on BIPAP overnight at 50% FIO2  - Follow Pulm recs.  Continue bronchodilators and steroids  - no acute ischemia  - Continue Lopressor and  Cardizem  - Switch CCB to long acting  - ECHO 3/2016 showed moderate ischemic myopathy, moderate-severe MR, severe dilated left atrium, normal LV, EF 67%.  Repeat tte from yesterday is similar   - Pt has hx of chronic afib on Eliquis.  CHADsVASc score 5.  Continue Eliquis 2.5 mg BID (with normal renal function, she meets criteria for 5 bid, though continue 2.5 for now given her drop in Hb). Watch Hb  - Symptomatology is mostly from underlying pulmonary disease.  CT chest with small bilateral effusions with RUL infiltrates  - Continue Lasix 20 mg daily PO  - Monitor and replete lytes, keep K>4, Mg>2  - Other cardiovascular workup will depend on clinical course.  - All other workup per primary team  - Will follow     Lorie Yang NP  Cardiology 83F w/pmh of COPD not on home 02, HTN, HLD, Afib on Eliquis, CHF, multiple compression fractures and ascending penetrating aortic ulcer presenting with sob.    Likely multifactorial 2/2 CHF exacerbation vs. COPD vs. aspiration pneumonia.  No clear evidence of acute ischemia,    - Patient is a DNR  - s/p RRT last night for sustained O2 Sat of 60's.  Went into MULU at that time up to 130-150's.  Placed on BIPAP overnight at 50% FIO2  - Follow Pulm recs.  Continue bronchodilators and steroids  - no acute ischemia  - Continue Lopressor and  Cardizem.  Her diltiazem was held for 3 doses prior to the RVR.  If BP does not tolerate diltiazem, we may need to re-try digoxin.   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  Repeat tte from yesterday is similar   - Pt has hx of chronic afib on Eliquis.  CHADsVASc score 5.  Continue Eliquis 2.5 mg BID (with normal renal function, she meets criteria for 5 bid, though continue 2.5 for now given her drop in Hb). Watch Hb  - Symptomatology is mostly from underlying pulmonary disease.  CT chest with small bilateral effusions with RUL infiltrates  - Creatinine continues to trend up.  I would hold Lasix for now. She does not appear to be acutely volume overloaded today  - Monitor and replete lytes, keep K>4, Mg>2  - Other cardiovascular workup will depend on clinical course.  - All other workup per primary team  - Will follow     Lorie Yang NP  Cardiology

## 2018-04-24 NOTE — PROGRESS NOTE ADULT - SUBJECTIVE AND OBJECTIVE BOX
Patient is a 83y old  Female who presents with a chief complaint of shortness of breath (2018 14:05)    24 hour events: Arrived at bedside for Rapid Response. Pt in respiratory distress, on NRB mask with O2 70's, afib with rvr 140's to 170's, sbp 140's. Pt admitted with COPD exacerbation, acute chf, PNA. Pt is DNR/DNI. Pt placed on Bipap mask, 16/8, 100%.    PAST MEDICAL & SURGICAL HISTORY:  CHF (congestive heart failure)  Compression fracture: Aug 2015  Carbon monoxide poisonin  Pulmonary emphysema, unspecified emphysema type  Secondary hypertension  Atrial fibrillation, unspecified type  History of arthroscopy of knee  History of appendectomy:   History of right shoulder replacement:       Review of Systems:  Constitutional: +Respiratory distressNo fever, chills, fatigue  Neuro: No headache, numbness, weakness  Resp: No cough, wheezing, +shortness of breath  CVS: No chest pain, palpitations, leg swelling  GI: No abdominal pain, nausea, vomiting, diarrhea   : No dysuria, frequency, incontinence  Skin: No itching, burning, rashes, or lesions   Msk: No joint pain or swelling  Psych: No depression, anxiety, mood swings    T(F): 97.7 (18 @ 04:09), Max: 99.5 (18 @ 08:30)  HR: 96 (18 @ 05:53) (79 - 130)  BP: 109/72 (18 @ 05:53) (95/60 - 117/78)  RR: 19 (18 @ 04:09) (17 - 19)  SpO2: 100% (18 @ 04:09) (92% - 100%)  Wt(kg): --        CAPILLARY BLOOD GLUCOSE      POCT Blood Glucose.: 181 mg/dL (2018 02:49)      I&O's Summary    2018 07:01  -  2018 06:10  --------------------------------------------------------  IN: 0 mL / OUT: 500 mL / NET: -500 mL        Physical Exam:     Gen: Respiratory Distress  Neuro: A&Ox3, non-focal  HEENT: NC/AT  Resp: Decreased BS bilaterally  CVS: nl S1/S2, tachy, irregular  Abd: soft, nt, nd, +bs  Ext: no edema, +pulses  Skin: well perfused, warm    Meds:    diltiazem    Tablet Oral  furosemide    Tablet Oral  metoprolol tartrate Oral  predniSONE   Tablet Oral  ALBUTerol    0.083% Nebulizer  ALBUTerol    90 MICROgram(s) HFA Inhaler Inhalation PRN  buDESOnide   0.5 milliGRAM(s) Respule Inhalation  tiotropium 2.5 MICROgram(s)/olodaterol 2.5 MICROgram(s) Inhaler Inhalation  acetaminophen   Tablet. Oral PRN  morphine  - Injectable IV Push PRN  apixaban Oral  pantoprazole    Tablet Oral  lidocaine   Patch Transdermal PRN                              9.7    9.5   )-----------( 346      ( 2018 07:48 )             29.2       04-    135  |  99  |  41<H>  ----------------------------<  157<H>  4.8   |  26  |  1.50<H>    Ca    8.6      2018 07:48    TPro  6.0  /  Alb  3.0<L>  /  TBili  0.6  /  DBili  x   /  AST  14<L>  /  ALT  19  /  AlkPhos  98  04-23          CXR:  Very low lung volumes. No change heart mediastinum. Bibasilar interstitial   edema small hazy effusions consistent with history of CHF. Question   superimposed airspace infiltrate at the right base. Recommend clinical   correlation close follow-up. Remainder study unchanged.     Impression: As above     TTE:  OBSERVATIONS:     Mitral Valve: MAC, moderate to severe MR., may be underestimated. There is   superior displacement of the posterior mitral valve leaflet with systole,   without bindu prolapse.   Aortic Valve/Aorta: Calcified trileaflet aortic valve.   Tricuspid Valve: normal with moderate TR.   Pulmonic Valve: normal   Left Atrium: Enlarged   Right Atrium: Enlarged   Left Ventricle: normal LV size and systolic function, estimated LVEF of 65%.   Right Ventricle: The right ventricle is dilated. Systolic function cannot be   accurately assessed.   Pericardium/Pleura: Right pleural effusion, no significant pericardial   effusion.   Pulmonary/RV Pressure: estimated PA systolic pressure of 53 mmHg assuming an   RA pressure of 10 mmHg.   LV Diastolic Function: Indeterminate     Normal left ventricular size and systolic function, estimated LVEF of 65%.   The right ventricle appears dilated with indeterminate systolic function.   Diastolic function is indeterminate. I atrial enlargement. The aortic root   is normal in size. Mitral annular calcification. There is superior   displacement of the posterior mitral valve leaflet with systole, without   bindu prolapse. There is at least moderate to severe MR. The aortic valve is   calcified without stenosis or insufficiency. Moderate TR. Estimated PA   systolic pressure is 53 mm Hg, assuming an RA pressure of 10 mmHg. No   significant pericardial effusion. Right pleural effusion         GLOBAL ISSUE/BEST PRACTICE:  Analgesia: yes  Sedation: no  HOB elevation: yes  Stress ulcer prophylaxis: yes  VTE prophylaxis: yes  Glycemic control: no  Nutrition: npo      CODE STATUS: DNR/DNI  GOC discussion: Y  Critical care time spent (mins): 55  (Reviewing data, imaging, discussing with multidisciplinary team, non inclusive of procedures, discussing goals of care with patient/family)

## 2018-04-24 NOTE — PROGRESS NOTE ADULT - PROBLEM SELECTOR PLAN 7
Hgb dropped from 12 to 10.3, now 9.7  Likely secondary to chronic disease  No evidence of bleed  iron panel showed elevated ferritin, rest of panel normal, normal b12 and folate  Monitor h/h daily

## 2018-04-24 NOTE — PROGRESS NOTE ADULT - PROBLEM SELECTOR PLAN 6
Likely secondary to lasix use  Recently switched from IV to PO lasix  Monitor Cr for improvement -Patient fluctuates between normal cr and DENISE. Unclear if patient has CKD at baseline or has frequent DENISE.  Dicontinued Lasix  Monitor Cr for improvement

## 2018-04-24 NOTE — PROGRESS NOTE ADULT - PROBLEM SELECTOR PLAN 5
ABG consistent with hypercapnic (co2 51, o2 78)  Assess need for home O2  Continue tiotroprium, albuterol, budesonide  D.C solumedrol, started on prednisone 20 BID

## 2018-04-24 NOTE — PROGRESS NOTE ADULT - PROBLEM SELECTOR PLAN 1
-Patient desaturated to 60s while on nasal cannula  -Started on bipap with significant improvement  -Given duoneb  -Encourage patient to use BIPAP at night  -Continue with lasix  -Continue with albuterol, tiotropium, budesonide -Patient desaturated to 60s while on nasal cannula  -Started on bipap with significant improvement  -Given duoneb  -Encourage patient to use BIPAP at night  -Continue with albuterol, tiotropium, budesonide

## 2018-04-24 NOTE — PROGRESS NOTE ADULT - ASSESSMENT
83F w/pmh of COPD not on home 02, HTN, HLD, Afib on Eliquis, CHF, multiple compression fractures and ascending penetrating aortic ulcer presenting with sob for 3-4 days, admitted for CHF exacerbation with underyling copd. rAPID Rapid response called because patient desat. to 60s and was in afib with RVR

## 2018-04-24 NOTE — PROGRESS NOTE ADULT - ASSESSMENT
83F with PMHx of COPD not on home O2, HTN, HLD, Afib on Eliquis, CHF, multiple compression fractures and ascending penetrating aortic ulcer, admitted with COPD exacerbation, PNA, acute exacerbation of chf, zeke. Pt now s/p Rapid Response for acute respiratory distress, afib with RVR    -Pain management.  -Consider anxiolytics   -Rate control. Continue Cardizem/BB/apixaban. Monitor HD's  -Improved respirations with bipap. Continue Bipap support. Pt is DNR/DNI  -Nebs/steroids  -NPO on bipap. PPI  -Monitor UOP/lytes. Continue diureses  -Off abx, consider restarting. Monitor wbc/temp. F/U cultures  -DVT ppx  -Supportive care  -Had lengthy discussion with Pt's daughter regarding clinical status. Given pt's CODE status, no additional benefit of ICU admission. Daughter initially confused about that, but later came to understand why. Also explained that if the pt required pressors then ICU would benefit her. She understood and was in agreement. Pt remains DNR/DNI. 83F with PMHx of COPD not on home O2, HTN, HLD, Afib on Eliquis, CHF, multiple compression fractures and ascending penetrating aortic ulcer, admitted with COPD exacerbation, PNA, acute exacerbation of chf, zeke. Pt now s/p Rapid Response for acute hypoxemic respiratory failure, afib with RVR    -Pain management.  -Consider anxiolytics   -Rate control. Continue Cardizem/BB/apixaban. Monitor HD's  -Improved respirations with bipap. Continue Bipap support. Pt is DNR/DNI  -Nebs/steroids  -NPO on bipap. PPI  -Monitor UOP/lytes. Continue diureses  -Off abx, consider restarting. Monitor wbc/temp. F/U cultures  -DVT ppx  -Supportive care  -Had lengthy discussion with Pt's daughter regarding clinical status. Given pt's CODE status, no additional benefit of ICU admission. Daughter initially confused about that, but later came to understand why. Also explained that if the pt required pressors then ICU would benefit her. She understood and was in agreement. Pt remains DNR/DNI.

## 2018-04-24 NOTE — PROGRESS NOTE ADULT - SUBJECTIVE AND OBJECTIVE BOX
HPI:  83F w/pmh of COPD not on home 02, HTN, HLD, Afib on Eliquis, CHF, multiple compression fractures and ascending penetrating aortic ulcer presenting with sob for 3-4 days. States that she has been having increasing sob for the past few days. States that she also has nausea and regurgitated clear fluids around 3-4 days ago. Pt has felt increasing weakness and lethargy with decreased appetite as well. She has tried nebulizers and inhalers for her sob without relief of her symptoms. Patient is unable to lie down flat due to compression fractures and difficulty breathing. Denies headache, fever, chills, vomiting, cough, chest pain, palpitations, peripheral edema and abdominal pain. No sick contacts. No recent travel. She ambulates with a cane. Patient has a history of spasmodic dysphonia which she was previously getting botox shots for but had an episode of aspiration so she stopped the injections.    In ED, patient was hypotensive with BP of 88/53, improved to 106/60. Remainder of vitals were within normal range. CBC  unremarkable, BMP significant for hyponatremia. ProBNP was 25,370. RVP negative. CT chest ordered and pending. Preliminary read of EKG revealed afib with ventricular rate of 85. CXR showed small right pleural effusion, questionable infiltrate. CT chest revealed small right greater than left pleural effusions. Scattered patchy right upper lobe pulmonary infiltrates new from the prior study. Emphysema. Stable peripherally calcified lesion in the spleen. Patient given Rocephin and zithromax in ED. (2018 17:33)    	  INTERVAL HPI/OVERNIGHT EVENTS: Rapid response was called because patient desaturated to 60s and was in afib with RVR at 150. Patient was started on BIPAP and improved with o2 sat in the 90s, given duoneb treatment and stat dose of cardizem 60.     MEDICATIONS  (STANDING):  ALBUTerol    0.083% 2.5 milliGRAM(s) Nebulizer every 8 hours  apixaban 2.5 milliGRAM(s) Oral every 12 hours  buDESOnide   0.5 milliGRAM(s) Respule 0.5 milliGRAM(s) Inhalation two times a day  diltiazem    Tablet 60 milliGRAM(s) Oral every 6 hours  furosemide    Tablet 20 milliGRAM(s) Oral daily  metoprolol tartrate 25 milliGRAM(s) Oral two times a day  pantoprazole    Tablet 40 milliGRAM(s) Oral before breakfast  predniSONE   Tablet 20 milliGRAM(s) Oral two times a day  tiotropium 2.5 MICROgram(s)/olodaterol 2.5 MICROgram(s) Inhaler 2 Puff(s) Inhalation daily    MEDICATIONS  (PRN):  acetaminophen   Tablet. 650 milliGRAM(s) Oral every 6 hours PRN Mild Pain (1 - 3)  ALBUTerol    90 MICROgram(s) HFA Inhaler 2 Puff(s) Inhalation every 6 hours PRN Bronchospasm  lidocaine   Patch 1 Patch Transdermal every 24 hours PRN right shoulder pain  morphine  - Injectable 0.25 milliGRAM(s) IV Push every 3 hours PRN resp distress, dyspnea, pain,      Allergies    No Known Allergies    Intolerances    PAST MEDICAL & SURGICAL HISTORY:  CHF (congestive heart failure)  Compression fracture: Aug 2015  Carbon monoxide poisonin  Pulmonary emphysema, unspecified emphysema type  Secondary hypertension  Atrial fibrillation, unspecified type  History of arthroscopy of knee  History of appendectomy:   History of right shoulder replacement:     Home Medications:  calcitonin nasal 200 intl units/inh spray: 1 spray(s) nasal in one nostril everyday, alternating each nostril.  (2018 21:25)  Eliquis 2.5 mg oral tablet: 1 tab(s) orally 2 times a day (2018 21:25)  furosemide 20 mg oral tablet: 1 tab(s) orally 2 times a day (2018 21:25)  losartan 50 mg oral tablet: 1 tab(s) orally once a day (2018 16:47)  metoprolol succinate 100 mg oral tablet, extended release: 1 tab(s) orally once a day (2018 21:25)  omeprazole 20 mg oral delayed release capsule: 1 cap(s) orally 2 times a day (2018 21:25)  Praluent Pen 75 mg/mL subcutaneous solution: subcutaneous 2 times a month (2018 21:25)  ProAir HFA 90 mcg/inh inhalation aerosol: Inhale 1 to 2 puffs every 4 to 6 hours as needed (2018 21:25)  Stiolto Respimat 2.5 mcg-2.5 mcg/inh inhalation aerosol: 2 puff(s) inhaled every 24 hours (2018 21:25)    CONSTITUTIONAL: admits generalized weakness which is improving since time of admission, no fevers or chills  EYES/ENT: No visual changes;  No vertigo or throat pain   NECK: admits neck discomfort, denies stiffness  RESPIRATORY: admits to shortness of breath this morning  CARDIOVASCULAR: admits to SOB  GASTROINTESTINAL: No abdominal pain. No nausea, vomiting, or hematemesis; No diarrhea or constipation. No melena or hematochezia.  GENITOURINARY: No dysuria, frequency or hematuria  NEUROLOGICAL: No numbness or weakness  MSK: Back pain  SKIN: No itching or rash    Vital Signs Last 24 Hrs  T(C): 36.5 (2018 04:09), Max: 37.5 (2018 08:30)  T(F): 97.7 (2018 04:09), Max: 99.5 (2018 08:30)  HR: 96 (2018 05:53) (79 - 130)  BP: 109/72 (2018 05:53) (95/60 - 117/78)  RR: 19 (2018 04:09) (17 - 19)  SpO2: 100% (2018 04:09) (92% - 100%)      Physical Exam  Constitutional: Elderly, frail female in NAD  HEENT: Normocephalic, Atraumatic, moist mucous membranes  Pulmonary: Decreased breath sounds at base of lungs, No wheezing, rales or rhonchi, speaking in full sentences, no use of accessory muscles  Cardiovascular: irregularly irregular.  S1 and S2 positive.    Gastrointestinal: soft, nontender, nondistended, no guarding, no rebound  : No CVA tenderness  Lymph: No peripheral edema. No cervical lymphadenopathy.  Neurological: Alert, follows commands, intact motor and sensation  MSK: No joint edema or erythema, Kyphosis noted in T spine  Skin: No rashes or ulcers    LABS:                                 9.7    9.5   )-----------( 346      ( 2018 07:48 )             29.2                    10.5   x     )-----------( x        ( 2018 14:38 )             31.1       04-23    135  |  99  |  41<H>  ----------------------------<  157<H>  4.8   |  26  |  1.50<H>    Ca    8.6      2018 07:48    TPro  6.0  /  Alb  3.0<L>  /  TBili  0.6  /  DBili  x   /  AST  14<L>  /  ALT  19  /  AlkPhos  98      135  |  99  |  41<H>  ----------------------------<  157<H>  4.8   |  26  |  1.50<H>    Ca    8.6      2018 07:48    TPro  6.0  /  Alb  3.0<L>  /  TBili  0.6  /  DBili  x   /  AST  14<L>  /  ALT  19  /  AlkPhos  98      Radiology  XR CHEST PORTABLE URGENT 1V                            PROCEDURE DATE:  2018          INTERPRETATION:  CHF, follow-up.    AP chest. Prior 2018.    Very low lung volumes. No change heart mediastinum. Bibasilar   interstitial edema small hazy effusions consistent with history of CHF.   Question superimposed airspace infiltrate at the right base. Recommend   clinical correlation close follow-up. Remainder study unchanged.    Impression: As above        EXAM:  CT CHEST                            PROCEDURE DATE:  2018          INTERPRETATION:  CLINICAL INFORMATION:  Shortness of breath    PROCEDURE:  Using multislice helical CT, thin sections were obtained from   the thoracic inlet through the lung bases.    COMPARISON: Chest x-ray of earlier in the day and CT chest of 2018    FINDINGS:      There is no significant axillary, hilar, or mediastinal lymphadenopathy.   There is no pericardial effusion. There are small free-flowing pleural   effusions right greater than left There is atherosclerotic calcification   of the aorta and tracheobronchial calcification. There is coronary artery   calcification.    There is emphysema. There is bibasilar atelectasis. There are patchy   areas of infiltrate in the right upper lobe.    The osseous structures demonstrate osteopenia and degenerative change.    The upper abdomen is remarkable for extensive vascular calcifications   with rounded peripherally calcified lesion again identified in the spleen   there are probable small gallstones in the gallbladder.    IMPRESSION: Small right greater than left pleural effusions  Scattered patchy right upper lobe pulmonary infiltrates new from the   prior study  Emphysema  Stable peripherally calcified lesion in the spleen HPI:  83F w/pmh of COPD not on home 02, HTN, HLD, Afib on Eliquis, CHF, multiple compression fractures and ascending penetrating aortic ulcer presenting with sob for 3-4 days. States that she has been having increasing sob for the past few days. States that she also has nausea and regurgitated clear fluids around 3-4 days ago. Pt has felt increasing weakness and lethargy with decreased appetite as well. She has tried nebulizers and inhalers for her sob without relief of her symptoms. Patient is unable to lie down flat due to compression fractures and difficulty breathing. Denies headache, fever, chills, vomiting, cough, chest pain, palpitations, peripheral edema and abdominal pain. No sick contacts. No recent travel. She ambulates with a cane. Patient has a history of spasmodic dysphonia which she was previously getting botox shots for but had an episode of aspiration so she stopped the injections.    In ED, patient was hypotensive with BP of 88/53, improved to 106/60. Remainder of vitals were within normal range. CBC  unremarkable, BMP significant for hyponatremia. ProBNP was 25,370. RVP negative. CT chest ordered and pending. Preliminary read of EKG revealed afib with ventricular rate of 85. CXR showed small right pleural effusion, questionable infiltrate. CT chest revealed small right greater than left pleural effusions. Scattered patchy right upper lobe pulmonary infiltrates new from the prior study. Emphysema. Stable peripherally calcified lesion in the spleen. Patient given Rocephin and zithromax in ED. (2018 17:33)    	  INTERVAL HPI/OVERNIGHT EVENTS: Rapid response was called because patient desaturated to 60s and was in afib with RVR at 150. Patient was started on BIPAP and improved with o2 sat in the 90s, given duoneb treatment and stat dose of cardizem 60. Patient's cardizem was held for three doses prior to RR, because of low BP. Patient seen and examined at bedside, patient is on nasal cannula, says she is breathing better and denies SOB. Denies any chest pain or SOB.    MEDICATIONS  (STANDING):  ALBUTerol    0.083% 2.5 milliGRAM(s) Nebulizer every 8 hours  apixaban 2.5 milliGRAM(s) Oral every 12 hours  buDESOnide   0.5 milliGRAM(s) Respule 0.5 milliGRAM(s) Inhalation two times a day  diltiazem    Tablet 60 milliGRAM(s) Oral every 6 hours  furosemide    Tablet 20 milliGRAM(s) Oral daily  metoprolol tartrate 25 milliGRAM(s) Oral two times a day  pantoprazole    Tablet 40 milliGRAM(s) Oral before breakfast  predniSONE   Tablet 20 milliGRAM(s) Oral two times a day  tiotropium 2.5 MICROgram(s)/olodaterol 2.5 MICROgram(s) Inhaler 2 Puff(s) Inhalation daily    MEDICATIONS  (PRN):  acetaminophen   Tablet. 650 milliGRAM(s) Oral every 6 hours PRN Mild Pain (1 - 3)  ALBUTerol    90 MICROgram(s) HFA Inhaler 2 Puff(s) Inhalation every 6 hours PRN Bronchospasm  lidocaine   Patch 1 Patch Transdermal every 24 hours PRN right shoulder pain  morphine  - Injectable 0.25 milliGRAM(s) IV Push every 3 hours PRN resp distress, dyspnea, pain,      Allergies    No Known Allergies    Intolerances    PAST MEDICAL & SURGICAL HISTORY:  CHF (congestive heart failure)  Compression fracture: Aug 2015  Carbon monoxide poisonin  Pulmonary emphysema, unspecified emphysema type  Secondary hypertension  Atrial fibrillation, unspecified type  History of arthroscopy of knee  History of appendectomy:   History of right shoulder replacement:     Home Medications:  calcitonin nasal 200 intl units/inh spray: 1 spray(s) nasal in one nostril everyday, alternating each nostril.  (2018 21:25)  Eliquis 2.5 mg oral tablet: 1 tab(s) orally 2 times a day (2018 21:25)  furosemide 20 mg oral tablet: 1 tab(s) orally 2 times a day (2018 21:25)  losartan 50 mg oral tablet: 1 tab(s) orally once a day (2018 16:47)  metoprolol succinate 100 mg oral tablet, extended release: 1 tab(s) orally once a day (2018 21:25)  omeprazole 20 mg oral delayed release capsule: 1 cap(s) orally 2 times a day (2018 21:25)  Praluent Pen 75 mg/mL subcutaneous solution: subcutaneous 2 times a month (2018 21:25)  ProAir HFA 90 mcg/inh inhalation aerosol: Inhale 1 to 2 puffs every 4 to 6 hours as needed (2018 21:25)  Stiolto Respimat 2.5 mcg-2.5 mcg/inh inhalation aerosol: 2 puff(s) inhaled every 24 hours (2018 21:25)    CONSTITUTIONAL: admits generalized weakness which is improving since time of admission, no fevers or chills  EYES/ENT: No visual changes;  No vertigo or throat pain   NECK: admits neck discomfort, denies stiffness  RESPIRATORY: admits to shortness of breath this morning  CARDIOVASCULAR: admits to SOB  GASTROINTESTINAL: No abdominal pain. No nausea, vomiting, or hematemesis; No diarrhea or constipation. No melena or hematochezia.  GENITOURINARY: No dysuria, frequency or hematuria  NEUROLOGICAL: No numbness or weakness  MSK: Back pain  SKIN: No itching or rash    Vital Signs Last 24 Hrs  T(C): 36.5 (2018 04:09), Max: 37.5 (2018 08:30)  T(F): 97.7 (2018 04:09), Max: 99.5 (2018 08:30)  HR: 96 (2018 05:53) (79 - 130)  BP: 109/72 (2018 05:53) (95/60 - 117/78)  RR: 19 (2018 04:09) (17 - 19)  SpO2: 100% (2018 04:09) (92% - 100%)      Physical Exam  Constitutional: Elderly, frail female in NAD  HEENT: Normocephalic, Atraumatic, moist mucous membranes  Pulmonary: Decreased breath sounds at base of lungs, No wheezing, rales or rhonchi, speaking in full sentences, no use of accessory muscles  Cardiovascular: irregularly irregular.  S1 and S2 positive.    Gastrointestinal: soft, nontender, nondistended, no guarding, no rebound  : No CVA tenderness  Lymph: No peripheral edema. No cervical lymphadenopathy.  Neurological: Alert, follows commands, intact motor and sensation  MSK: No joint edema or erythema, Kyphosis noted in T spine  Skin: No rashes or ulcers    LABS:                                 9.7    9.5   )-----------( 346      ( 2018 07:48 )             29.2                    10.5   x     )-----------( x        ( 2018 14:38 )             31.1       04-23    135  |  99  |  41<H>  ----------------------------<  157<H>  4.8   |  26  |  1.50<H>    Ca    8.6      2018 07:48    TPro  6.0  /  Alb  3.0<L>  /  TBili  0.6  /  DBili  x   /  AST  14<L>  /  ALT  19  /  AlkPhos  98      135  |  99  |  41<H>  ----------------------------<  157<H>  4.8   |  26  |  1.50<H>    Ca    8.6      2018 07:48    TPro  6.0  /  Alb  3.0<L>  /  TBili  0.6  /  DBili  x   /  AST  14<L>  /  ALT  19  /  AlkPhos  98      Radiology  XR CHEST PORTABLE URGENT 1V                            PROCEDURE DATE:  2018          INTERPRETATION:  CHF, follow-up.    AP chest. Prior 2018.    Very low lung volumes. No change heart mediastinum. Bibasilar   interstitial edema small hazy effusions consistent with history of CHF.   Question superimposed airspace infiltrate at the right base. Recommend   clinical correlation close follow-up. Remainder study unchanged.    Impression: As above        EXAM:  CT CHEST                            PROCEDURE DATE:  2018          INTERPRETATION:  CLINICAL INFORMATION:  Shortness of breath    PROCEDURE:  Using multislice helical CT, thin sections were obtained from   the thoracic inlet through the lung bases.    COMPARISON: Chest x-ray of earlier in the day and CT chest of 2018    FINDINGS:      There is no significant axillary, hilar, or mediastinal lymphadenopathy.   There is no pericardial effusion. There are small free-flowing pleural   effusions right greater than left There is atherosclerotic calcification   of the aorta and tracheobronchial calcification. There is coronary artery   calcification.    There is emphysema. There is bibasilar atelectasis. There are patchy   areas of infiltrate in the right upper lobe.    The osseous structures demonstrate osteopenia and degenerative change.    The upper abdomen is remarkable for extensive vascular calcifications   with rounded peripherally calcified lesion again identified in the spleen   there are probable small gallstones in the gallbladder.    IMPRESSION: Small right greater than left pleural effusions  Scattered patchy right upper lobe pulmonary infiltrates new from the   prior study  Emphysema  Stable peripherally calcified lesion in the spleen

## 2018-04-25 LAB
ANION GAP SERPL CALC-SCNC: 9 MMOL/L — SIGNIFICANT CHANGE UP (ref 5–17)
BASE EXCESS BLDA CALC-SCNC: 3.4 MMOL/L — HIGH (ref -2–2)
BLOOD GAS COMMENTS ARTERIAL: SIGNIFICANT CHANGE UP
BUN SERPL-MCNC: 42 MG/DL — HIGH (ref 7–23)
CALCIUM SERPL-MCNC: 8.7 MG/DL — SIGNIFICANT CHANGE UP (ref 8.5–10.1)
CHLORIDE SERPL-SCNC: 97 MMOL/L — SIGNIFICANT CHANGE UP (ref 96–108)
CO2 SERPL-SCNC: 26 MMOL/L — SIGNIFICANT CHANGE UP (ref 22–31)
CREAT SERPL-MCNC: 1.5 MG/DL — HIGH (ref 0.5–1.3)
CRP SERPL-MCNC: 0.3 MG/DL — SIGNIFICANT CHANGE UP (ref 0–0.4)
GLUCOSE SERPL-MCNC: 168 MG/DL — HIGH (ref 70–99)
HCO3 BLDA-SCNC: 27 MMOL/L — SIGNIFICANT CHANGE UP (ref 23–27)
HCT VFR BLD CALC: 31 % — LOW (ref 34.5–45)
HGB BLD-MCNC: 10.2 G/DL — LOW (ref 11.5–15.5)
HOROWITZ INDEX BLDA+IHG-RTO: 100 — SIGNIFICANT CHANGE UP
MCHC RBC-ENTMCNC: 33 GM/DL — SIGNIFICANT CHANGE UP (ref 32–36)
MCHC RBC-ENTMCNC: 35 PG — HIGH (ref 27–34)
MCV RBC AUTO: 106 FL — HIGH (ref 80–100)
PCO2 BLDA: 39 MMHG — SIGNIFICANT CHANGE UP (ref 32–46)
PH BLDA: 7.45 — SIGNIFICANT CHANGE UP (ref 7.35–7.45)
PLATELET # BLD AUTO: 348 K/UL — SIGNIFICANT CHANGE UP (ref 150–400)
PO2 BLDA: 53 MMHG — LOW (ref 74–108)
POTASSIUM SERPL-MCNC: 5.2 MMOL/L — SIGNIFICANT CHANGE UP (ref 3.5–5.3)
POTASSIUM SERPL-SCNC: 5.2 MMOL/L — SIGNIFICANT CHANGE UP (ref 3.5–5.3)
RBC # BLD: 2.93 M/UL — LOW (ref 3.8–5.2)
RBC # FLD: 15.8 % — HIGH (ref 10.3–14.5)
SAO2 % BLDA: 91 % — LOW (ref 92–96)
SODIUM SERPL-SCNC: 132 MMOL/L — LOW (ref 135–145)
WBC # BLD: 9.1 K/UL — SIGNIFICANT CHANGE UP (ref 3.8–10.5)
WBC # FLD AUTO: 9.1 K/UL — SIGNIFICANT CHANGE UP (ref 3.8–10.5)

## 2018-04-25 PROCEDURE — 71045 X-RAY EXAM CHEST 1 VIEW: CPT | Mod: 26

## 2018-04-25 PROCEDURE — 99233 SBSQ HOSP IP/OBS HIGH 50: CPT | Mod: GC

## 2018-04-25 PROCEDURE — 99233 SBSQ HOSP IP/OBS HIGH 50: CPT

## 2018-04-25 RX ORDER — PIPERACILLIN AND TAZOBACTAM 4; .5 G/20ML; G/20ML
3.38 INJECTION, POWDER, LYOPHILIZED, FOR SOLUTION INTRAVENOUS EVERY 8 HOURS
Qty: 0 | Refills: 0 | Status: DISCONTINUED | OUTPATIENT
Start: 2018-04-25 | End: 2018-05-01

## 2018-04-25 RX ORDER — FUROSEMIDE 40 MG
40 TABLET ORAL
Qty: 0 | Refills: 0 | Status: DISCONTINUED | OUTPATIENT
Start: 2018-04-25 | End: 2018-05-01

## 2018-04-25 RX ORDER — FUROSEMIDE 40 MG
40 TABLET ORAL DAILY
Qty: 0 | Refills: 0 | Status: DISCONTINUED | OUTPATIENT
Start: 2018-04-25 | End: 2018-04-25

## 2018-04-25 RX ADMIN — APIXABAN 2.5 MILLIGRAM(S): 2.5 TABLET, FILM COATED ORAL at 05:44

## 2018-04-25 RX ADMIN — MIRTAZAPINE 7.5 MILLIGRAM(S): 45 TABLET, ORALLY DISINTEGRATING ORAL at 23:35

## 2018-04-25 RX ADMIN — Medication 0.5 MILLIGRAM(S): at 20:14

## 2018-04-25 RX ADMIN — Medication 25 MILLIGRAM(S): at 17:36

## 2018-04-25 RX ADMIN — Medication 25 MILLIGRAM(S): at 05:44

## 2018-04-25 RX ADMIN — ALBUTEROL 2.5 MILLIGRAM(S): 90 AEROSOL, METERED ORAL at 06:31

## 2018-04-25 RX ADMIN — Medication 40 MILLIGRAM(S): at 11:32

## 2018-04-25 RX ADMIN — Medication 40 MILLIGRAM(S): at 18:41

## 2018-04-25 RX ADMIN — PANTOPRAZOLE SODIUM 40 MILLIGRAM(S): 20 TABLET, DELAYED RELEASE ORAL at 05:44

## 2018-04-25 RX ADMIN — ALBUTEROL 2.5 MILLIGRAM(S): 90 AEROSOL, METERED ORAL at 20:14

## 2018-04-25 RX ADMIN — Medication 650 MILLIGRAM(S): at 20:30

## 2018-04-25 RX ADMIN — Medication 650 MILLIGRAM(S): at 19:31

## 2018-04-25 RX ADMIN — Medication 20 MILLIGRAM(S): at 17:36

## 2018-04-25 RX ADMIN — PIPERACILLIN AND TAZOBACTAM 25 GRAM(S): 4; .5 INJECTION, POWDER, LYOPHILIZED, FOR SOLUTION INTRAVENOUS at 21:33

## 2018-04-25 RX ADMIN — Medication 20 MILLIGRAM(S): at 05:44

## 2018-04-25 RX ADMIN — APIXABAN 2.5 MILLIGRAM(S): 2.5 TABLET, FILM COATED ORAL at 17:36

## 2018-04-25 RX ADMIN — Medication 0.25 MILLIGRAM(S): at 08:58

## 2018-04-25 RX ADMIN — LIDOCAINE 1 PATCH: 4 CREAM TOPICAL at 15:21

## 2018-04-25 RX ADMIN — TIOTROPIUM BROMIDE AND OLODATEROL 2 PUFF(S): 3.124; 2.736 SPRAY, METERED RESPIRATORY (INHALATION) at 05:50

## 2018-04-25 RX ADMIN — Medication 0.5 MILLIGRAM(S): at 06:31

## 2018-04-25 RX ADMIN — ALBUTEROL 2.5 MILLIGRAM(S): 90 AEROSOL, METERED ORAL at 14:45

## 2018-04-25 NOTE — DIETITIAN INITIAL EVALUATION ADULT. - PROBLEM SELECTOR PLAN 1
Admit to telemetry  IV lasix 20mg BID if blood pressure can tolerate  Strict I and O  Fluid restriction  Echo pending  Dr. Mae consulted  DASH diet  Out of bed with assist  Fall precautions  CBC and BMP, BNP in AM

## 2018-04-25 NOTE — PROVIDER CONTACT NOTE (OTHER) - ACTION/TREATMENT ORDERED:
notified and aware. Vitals performed and entered in flowsheet.  H&H ordered STAT.
Resp tech @ bedside - put on BIPAP @ 100 %.
Furosemide given as per md order,. Will continue to monitor
Straight cath produced 256 cc to 270 cc urine. WIll continue to monitor

## 2018-04-25 NOTE — DIETITIAN INITIAL EVALUATION ADULT. - PROBLEM SELECTOR PLAN 2
-Community Acquired  -CT revealed small right greater than left pleural effusions. Scattered patchy right upper lobe pulmonary infiltrates new from the prior study.  -Received dose of Rocephin and zithromax  -Continue with abx regimen  -Monitor O2 sat  -Order procalcitonin

## 2018-04-25 NOTE — PROGRESS NOTE ADULT - PROBLEM SELECTOR PLAN 6
-Patient fluctuates between normal cr and DENISE. Unclear if patient has CKD at baseline or has frequent DENISE.  Dicontinued Lasix  Monitor Cr for improvement -Patient fluctuates between normal cr and DENISE. Unclear if patient has CKD at baseline or has frequent DENISE.  Improving  Restarted on Lasix  Monitor Cr for improvement

## 2018-04-25 NOTE — DIETITIAN INITIAL EVALUATION ADULT. - PHYSICAL APPEARANCE
BMI 17.1 based on admit wt and given ht per family. unsure of weight history but per family weight loss noted unsure of amount/underweight

## 2018-04-25 NOTE — DIETITIAN INITIAL EVALUATION ADULT. - OTHER INFO
limited weight information from family but with PO over time decreasing. last 2 days with fair PO reported better PO when xanax given.. lactose free diet patient cannot tolerate ensure but willing to try either ensure clear or ensure enlive. nutrition focused physical exam complete. moderate chronic malnutrition with physical findings of muscle loss temples clavicles shoulders calf and body fat loss triceps.

## 2018-04-25 NOTE — PROGRESS NOTE ADULT - PROBLEM SELECTOR PLAN 3
RVR, given stat dose of cardizem 60  Blood pressure has not been able to tolerate cardizem and metoprolol, decrease the hold parameters. If BP continues to be low, will consider switching to digoxin  Continue metoprolol 25mg daily with low BP, continue cardizem 60 QID for now  -[id number] score of 5, continue anticoagulation with Eliquis 2.5 BID, she meets criteria for 5 bid, though continue 2.5 for now given her drop in Hb). Watch Hb No RVR reported overnight on tele.   Holding parameters were decreased for cardizem and metoprolol as patient was not receiving medications because BP was low. If BP continues to be low, will consider switching to digoxin, continue cardizem 60 QID and metoprolol for now  -[id number] score of 5, continue anticoagulation with Eliquis 2.5 BID, she meets criteria for 5 bid, though continue 2.5 for now given her drop in Hb). Watch Hb

## 2018-04-25 NOTE — PROGRESS NOTE ADULT - PROBLEM SELECTOR PLAN 1
-Patient desaturated to 60s while on nasal cannula  -Started on bipap with significant improvement  -Given duoneb  -Encourage patient to use BIPAP at night  -Continue with albuterol, tiotropium, budesonide  -PT recommends subacute rehab -Patient desaturated this morning, RR called, placed on BIPAP, the switched to high flow as patient cannot tolerate BIPAP. This is likely secondary to fluid overload  -BNP>30,000, increased from prior  -Restarted on Lasix 40 IV BID: per cardio recs  -Monitor kidney function while on lasix  -Continue with albuterol, tiotropium, budesonide, duonebs  -PT recommends subacute rehab -Patient desaturated again this morning, RR called, placed on BIPAP, the switched to high flow as patient cannot tolerate BIPAP. This is likely secondary to fluid overload  -BNP>30,000, increased from prior  -Restarted on Lasix 40 IV BID: per cardio recs  -Monitor kidney function while on lasix  -Continue with albuterol, tiotropium, budesonide, duonebs  -PT recommends subacute rehab

## 2018-04-25 NOTE — DIETITIAN INITIAL EVALUATION ADULT. - SIGNS/SYMPTOMS
as evidenced by mild subcutaneous fat loss triceps & muscle loss temples ,clavicles ,shoulders ,calf

## 2018-04-25 NOTE — PROGRESS NOTE ADULT - ASSESSMENT
83F w/pmh of COPD not on home 02, HTN, HLD, Afib on Eliquis, CHF, multiple compression fractures and ascending penetrating aortic ulcer presenting with sob for 3-4 days, admitted for CHF exacerbation with underyling copd. rAPID Rapid response called because patient desat. to 60s and was in afib with RVR 83F w/pmh of COPD not on home 02, HTN, HLD, Afib on Eliquis, CHF, multiple compression fractures and ascending penetrating aortic ulcer presenting with sob for 3-4 days, admitted for CHF exacerbation with underyling copd. rAPID Rapid response called because patient desat. to 60s and was in afib with RVR.

## 2018-04-25 NOTE — DIETITIAN INITIAL EVALUATION ADULT. - PROBLEM SELECTOR PLAN 4
Normally takes metoprolol  daily, will decrease to 5o daily with low BP  CHADVASC score of 5, continue anticoagulation with Eliquis 2.5 BID

## 2018-04-25 NOTE — PROGRESS NOTE ADULT - PROBLEM SELECTOR PLAN 2
-Dicontinued PO lasix 20, patient with DENISE  -Patient will need to be evaluated for home O2 if symptoms don't improve but this is likely due to increased pulm vascular congestion.  -Echo performed similar to prior  -Continue strict I and O -Plan as above  -Patient will need to be evaluated for home O2 if symptoms don't improve but this is likely due to increased pulm vascular congestion.  -Echo performed similar to prior  -Continue strict I and O

## 2018-04-25 NOTE — CHART NOTE - NSCHARTNOTEFT_GEN_A_CORE
Resident Rapid Response Note    Patient is a 83y old  Female who presents with a chief complaint of shortness of breath (20 Apr 2018 14:05)      Rapid response was called at on this 83y Female patient  COPD not on home 02, HTN, HLD, Afib on Eliquis, CHF, multiple compression fractures and ascending penetrating aortic ulcer presenting with sob for 3-4 days, admitted for CHF exacerbation with underlying copd for  hypoxia with O2 sats in 70s. Patient pulled off her BIPAP at approximately 2:30 am, then began to desat around 6:15. Was placed back on BIPAP by RT. I evaluated patient, began duoneb treatment with pulmicort. RT increased BIPAP settings to 14/8. O2 briefly sarah to mid 80s, then returned to mid 70s, at which point rapid was called. Patient's O2 sats began to climb shortly after rapid was called, returning to high 90s    Patient was seen and examined at the bedside by the rapid response team and primary team. Senior resident Dr. Jason, ICU PA  at bedside.    Rapid Response Vital Signs:  BP: 118/72  HR: 83  RR:28  SpO2: 74 % on BIPAP 14/8  Temp: 98  FS: 239    Vital Signs Last 24 Hrs  T(C): 36.9 (25 Apr 2018 04:19), Max: 37.2 (24 Apr 2018 23:23)  T(F): 98.5 (25 Apr 2018 04:19), Max: 99 (24 Apr 2018 23:23)  HR: 81 (25 Apr 2018 06:46) (79 - 106)  BP: 104/70 (25 Apr 2018 04:19) (91/56 - 106/66)  BP(mean): --  RR: 17 (25 Apr 2018 04:19) (16 - 18)  SpO2: 100% (25 Apr 2018 06:46) (76% - 100%)    Physical Exam:  General: in mild respiratory distress on BIPAP  HEENT: NCAT, PERRLA, EOMI bl, moist mucous membranes   Neck: Supple, nontender, no mass  Neurology: A&Ox4, nonfocal, CN II-XII grossly intact, sensation intact  Respiratory: Decreased breath sounds in bilateral bases  CV: tachycardic, irregular rhythm. No murmurs  Abdominal: Soft, NT, ND +BSx4  Extremities: No C/C/E, + peripheral pulses  MSK: Normal ROM, no joint erythema or warmth, no joint swelling   Skin: warm, dry, normal color, no rash or abnormal lesions    LABS:                        9.7    9.0   )-----------( 330      ( 24 Apr 2018 06:55 )             29.8     24 Apr 2018 06:55    134    |  98     |  44     ----------------------------<  150    4.9     |  28     |  1.70     Ca    8.8        24 Apr 2018 06:55          CAPILLARY BLOOD GLUCOSE      POCT Blood Glucose.: 239 mg/dL (25 Apr 2018 06:38)            Assessment/Plan:  83F w/pmh of COPD not on home 02, HTN, HLD, Afib on Eliquis, CHF, multiple compression fractures and ascending penetrating aortic ulcer presenting with sob for 3-4 days, admitted for CHF exacerbation with underlying copd with RR called for hypoxia with O2 sats in 70s on BIPAP. Patient improved shortly after rapid was called  Likely 2/2 to COPD exacerbation  Received BIPAP with increased settings  Received duonebs and pulmicort.    Continue BIPAP at current settings, 14/8   Continue duonebs and po steroids  ICU PA and Dr. Reyes, aware, agree with plan        -Will continue to follow, RN to call if any changes.    Dr. Rodriguez  PGY-1 [phone]

## 2018-04-25 NOTE — PROGRESS NOTE ADULT - SUBJECTIVE AND OBJECTIVE BOX
Date/Time Patient Seen:  		  Referring MD:   Data Reviewed	       Patient is a 83y old  Female who presents with a chief complaint of shortness of breath (2018 14:05)  in bed  seen and examined  vs and meds reviewed  on BIPAP  am RRT noted      Subjective/HPI     PAST MEDICAL & SURGICAL HISTORY:  CHF (congestive heart failure)  Compression fracture: Aug 2015  Carbon monoxide poisonin  Pulmonary emphysema, unspecified emphysema type  Secondary hypertension  Atrial fibrillation, unspecified type  History of arthroscopy of knee  History of appendectomy:   History of right shoulder replacement:   S/P appy        Medication list         MEDICATIONS  (STANDING):  ALBUTerol    0.083% 2.5 milliGRAM(s) Nebulizer every 8 hours  ALPRAZolam 0.25 milliGRAM(s) Oral two times a day  apixaban 2.5 milliGRAM(s) Oral every 12 hours  buDESOnide   0.5 milliGRAM(s) Respule 0.5 milliGRAM(s) Inhalation two times a day  diltiazem    Tablet 60 milliGRAM(s) Oral four times a day  metoprolol tartrate 25 milliGRAM(s) Oral two times a day  mirtazapine 7.5 milliGRAM(s) Oral at bedtime  pantoprazole    Tablet 40 milliGRAM(s) Oral before breakfast  predniSONE   Tablet 20 milliGRAM(s) Oral two times a day  tiotropium 2.5 MICROgram(s)/olodaterol 2.5 MICROgram(s) Inhaler 2 Puff(s) Inhalation daily    MEDICATIONS  (PRN):  acetaminophen   Tablet. 650 milliGRAM(s) Oral every 6 hours PRN Mild Pain (1 - 3)  ALBUTerol    90 MICROgram(s) HFA Inhaler 2 Puff(s) Inhalation every 6 hours PRN Bronchospasm  lidocaine   Patch 1 Patch Transdermal every 24 hours PRN right shoulder pain  morphine  - Injectable 0.25 milliGRAM(s) IV Push every 3 hours PRN resp distress, dyspnea, pain,         Vitals log        ICU Vital Signs Last 24 Hrs  T(C): 36.9 (2018 04:19), Max: 37.2 (2018 23:23)  T(F): 98.5 (2018 04:19), Max: 99 (2018 23:23)  HR: 81 (2018 06:46) (79 - 106)  BP: 104/70 (2018 04:19) (91/56 - 106/66)  BP(mean): --  ABP: --  ABP(mean): --  RR: 17 (2018 04:19) (16 - 18)  SpO2: 100% (2018 06:46) (76% - 100%)           Input and Output:  I&O's Detail    2018 07:  -  2018 07:00  --------------------------------------------------------  IN:  Total IN: 0 mL    OUT:    Voided: 300 mL  Total OUT: 300 mL    Total NET: -300 mL          Lab Data                        9.7    9.0   )-----------( 330      ( 2018 06:55 )             29.8     04-24    134<L>  |  98  |  44<H>  ----------------------------<  150<H>  4.9   |  28  |  1.70<H>    Ca    8.8      2018 06:55    TPro  6.0  /  Alb  3.0<L>  /  TBili  0.6  /  DBili  x   /  AST  14<L>  /  ALT  19  /  AlkPhos  98  04-23            Review of Systems	      Objective     Physical Examination  frail  weak  daughter at the BS  lung dec BS  abd soft        Pertinent Lab findings & Imaging      Elpidio:  NO   Adequate UO     I&O's Detail    2018 07:  -  2018 07:00  --------------------------------------------------------  IN:  Total IN: 0 mL    OUT:    Voided: 300 mL  Total OUT: 300 mL    Total NET: -300 mL               Discussed with:     Cultures:	        Radiology

## 2018-04-25 NOTE — PROGRESS NOTE ADULT - PROBLEM SELECTOR PLAN 7
Hgb dropped from 12 to 10.3, now   Likely secondary to chronic disease  No evidence of bleed  iron panel showed elevated ferritin, rest of panel normal, normal b12 and folate  Monitor h/h daily

## 2018-04-25 NOTE — CHART NOTE - NSCHARTNOTEFT_GEN_A_CORE
Called by RN because patient requesting Xanax, which had been discontinued. Patient is 83F w/pmh of COPD not on home 02, HTN, HLD, Afib on Eliquis, CHF, multiple compression fractures and ascending penetrating aortic ulcer presenting with sob for 3-4 days, admitted for CHF exacerbation with underyling copd. Patient has had rapid responses called earlier this morning and previous night for O2 desaturation. Patient had been placed on BIPAP following RR, is now on high flow, satting at approximately 90 percent. Currently sleeping.   Discussed reason for not reinstating Xanax with patient's daughter and sister at bedside. Explained that benzodiazpines can suppress respiratory drive, which might lead to further desaturation. Patient was recently seen by attending psychiatrist, Dr. Chery, who prescribed Remeron 7.5 mg po QHS. Patient's family expressed understanding of the reasons for stopping Xanax and agree to continue Remeron to treat anxiety, difficulty sleeping.  Will continue to monitor.

## 2018-04-25 NOTE — PROGRESS NOTE ADULT - ASSESSMENT
83F w/pmh of COPD not on home 02, HTN, HLD, Afib on Eliquis, CHF, multiple compression fractures and ascending penetrating aortic ulcer presenting with sob.    Likely multifactorial 2/2 CHF exacerbation vs. COPD vs. aspiration pneumonia.  No clear evidence of acute ischemia,      - Pt. was again s/p RRT this morning for sustained O2 Sat of 70's.  Pt. remains   Placed on BIPAP at 50% FIO2, pt. responded to Bipap   - Follow Pulm recs.  Continue bronchodilators and steroids  - No acute ischemia  - Continue Lopressor and  Cardizem.  Her diltiazem was held for 3 doses prior to the RVR.  If BP does not tolerate diltiazem, we may need to re-try digoxin.   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  Repeat tte from yesterday is similar   - Pt has hx of chronic afib on Eliquis.  CHADsVASc score 5.  Continue Eliquis 2.5 mg BID (with normal renal function, she meets criteria for 5 bid, though continue 2.5 for now given her drop in Hb). Watch Hb  - Symptomatology is mostly from underlying pulmonary disease.  CT chest with small bilateral effusions with RUL infiltrates  - Continue to hold Lasix, continue to monitor creatinine  - Monitor and replete lytes, keep K>4, Mg>2  - Cardiovascular workup will depend on clinical course, all other workup per primary team  -Will continue to follow-up   - Patient is a DNR 83F w/pmh of COPD not on home 02, HTN, HLD, Afib on Eliquis, CHF, multiple compression fractures and ascending penetrating aortic ulcer presenting with sob.    Likely multifactorial 2/2 CHF exacerbation vs. COPD vs. aspiration pneumonia.  No clear evidence of acute ischemia,      - Pt. was again s/p RRT this morning for sustained O2 Sat of 70's.  Pt. placed on HiFlow @ at 50% FIO2, pt. responded to HiFlow   - Follow Pulm recs.  Continue bronchodilators and steroids  - No acute ischemia  - Continue Lopressor 25mg BID and Cardizem 60mg 4x daily. Pt. tolerated medications overnight SBP b90's-117. DBP 60's-70's    - ECHO 3/2016 showed moderate ischemic myopathy, moderate-severe MR, severe dilated left atrium, normal LV, EF 67%.  Repeat tte from yesterday is similar   - Pt has hx of chronic afib on Eliquis.  CHADsVASc score 5.  H& H remains stable Continue Eliquis 2.5 mg BID ( pt. has normal renal function, she met criteria for 5 bid, however. Continue to monitor CBC   - Symptomatology is mostly from underlying pulmonary disease.  CT chest with small bilateral effusions with RUL infiltrates  - Continue to hold Lasix, continue to monitor creatinine  - Monitor and replete lytes, keep K>4, Mg>2  - Cardiovascular workup will depend on clinical course, all other workup per primary team  -Will continue to follow-up   - Patient is a DNR 83F w/pmh of COPD not on home 02, HTN, HLD, Afib on Eliquis, CHF, multiple compression fractures and ascending penetrating aortic ulcer presenting with sob.    Likely multifactorial 2/2 CHF exacerbation vs. COPD vs. aspiration pneumonia.  No clear evidence of acute ischemia,    - Pt. was again s/p RRT this morning for sustained O2 Sat of 70's.  Pt. placed on HiFlow @ at 50% FIO2, pt. responded to HiFlow   - Follow Pulm recs.  Continue bronchodilators and steroids  - She seems to need diuresis. I would give her Lasixk 40 IV bid.  - No acute ischemia  - Continue Lopressor 25mg BID and Cardizem 60mg 4x daily. Pt. tolerated medications overnight SBP b90's-117. DBP 60's-70's    - ECHO 3/2016 showed moderate ischemic myopathy, moderate-severe MR, severe dilated left atrium, normal LV, EF 67%.  Repeat tte from yesterday is similar   - Pt has hx of chronic afib on Eliquis.  CHADsVASc score 5.  H& H remains stable Continue Eliquis 2.5 mg BID ( pt. has normal renal function, she met criteria for 5 bid, however. Continue to monitor CBC)  - Symptomatology is mostly from underlying pulmonary disease.  CT chest with small bilateral effusions with RUL infiltrates  - Continue to hold Lasix, continue to monitor creatinine  - Monitor and replete lytes, keep K>4, Mg>2  - Cardiovascular workup will depend on clinical course, all other workup per primary team  - Will continue to follow-up   - Patient is a DNR

## 2018-04-25 NOTE — DIETITIAN INITIAL EVALUATION ADULT. - NS AS NUTRI INTERV MEALS SNACK
[place] - modified diet/consider consistent cho low Na diet continue fluid restriction per MD discretion/Carbohydrate - modified diet

## 2018-04-25 NOTE — CHART NOTE - NSCHARTNOTEFT_GEN_A_CORE
Upon Nutritional Assessment by the Registered Dietitian your patient was determined to meet criteria / has evidence of the following diagnosis/diagnoses:          [ ]  Mild Protein Calorie Malnutrition        [x ]  Moderate Protein Calorie Malnutrition        [ ] Severe Protein Calorie Malnutrition        [ ] Unspecified Protein Calorie Malnutrition        [ x] Underweight / BMI <19        [ ] Morbid Obesity / BMI > 40      Findings as based on:  •  Comprehensive nutrition assessment and consultation  •  Calorie counts (nutrient intake analysis)  •  Food acceptance and intake status from observations by staff        Treatment:    The following diet has been recommended:  consistent cho low Na 1000ml fluid restriction with ensure clear daily  suggest add MVI   PROVIDER Section:     By signing this assessment you are acknowledging and agree with the diagnosis/diagnoses assigned by the Registered Dietitian    Comments:

## 2018-04-25 NOTE — PROGRESS NOTE ADULT - SUBJECTIVE AND OBJECTIVE BOX
HPI:  83F w/pmh of COPD not on home 02, HTN, HLD, Afib on Eliquis, CHF, multiple compression fractures and ascending penetrating aortic ulcer presenting with sob for 3-4 days. States that she has been having increasing sob for the past few days. States that she also has nausea and regurgitated clear fluids around 3-4 days ago. Pt has felt increasing weakness and lethargy with decreased appetite as well. She has tried nebulizers and inhalers for her sob without relief of her symptoms. Patient is unable to lie down flat due to compression fractures and difficulty breathing. Denies headache, fever, chills, vomiting, cough, chest pain, palpitations, peripheral edema and abdominal pain. No sick contacts. No recent travel. She ambulates with a cane. Patient has a history of spasmodic dysphonia which she was previously getting botox shots for but had an episode of aspiration so she stopped the injections.    In ED, patient was hypotensive with BP of 88/53, improved to 106/60. Remainder of vitals were within normal range. CBC  unremarkable, BMP significant for hyponatremia. ProBNP was 25,370. RVP negative. CT chest ordered and pending. Preliminary read of EKG revealed afib with ventricular rate of 85. CXR showed small right pleural effusion, questionable infiltrate. CT chest revealed small right greater than left pleural effusions. Scattered patchy right upper lobe pulmonary infiltrates new from the prior study. Emphysema. Stable peripherally calcified lesion in the spleen. Patient given Rocephin and zithromax in ED. (2018 17:33)    	  INTERVAL HPI/OVERNIGHT EVENTS:     MEDICATIONS  (STANDING):  MEDICATIONS  (STANDING):  ALBUTerol    0.083% 2.5 milliGRAM(s) Nebulizer every 8 hours  ALPRAZolam 0.25 milliGRAM(s) Oral two times a day  apixaban 2.5 milliGRAM(s) Oral every 12 hours  buDESOnide   0.5 milliGRAM(s) Respule 0.5 milliGRAM(s) Inhalation two times a day  diltiazem    Tablet 60 milliGRAM(s) Oral four times a day  metoprolol tartrate 25 milliGRAM(s) Oral two times a day  mirtazapine 7.5 milliGRAM(s) Oral at bedtime  pantoprazole    Tablet 40 milliGRAM(s) Oral before breakfast  predniSONE   Tablet 20 milliGRAM(s) Oral two times a day  tiotropium 2.5 MICROgram(s)/olodaterol 2.5 MICROgram(s) Inhaler 2 Puff(s) Inhalation daily    MEDICATIONS  (PRN):  acetaminophen   Tablet. 650 milliGRAM(s) Oral every 6 hours PRN Mild Pain (1 - 3)  ALBUTerol    90 MICROgram(s) HFA Inhaler 2 Puff(s) Inhalation every 6 hours PRN Bronchospasm  lidocaine   Patch 1 Patch Transdermal every 24 hours PRN right shoulder pain  morphine  - Injectable 0.25 milliGRAM(s) IV Push every 3 hours PRN resp distress, dyspnea, pain,      Allergies    No Known Allergies    Intolerances    PAST MEDICAL & SURGICAL HISTORY:  CHF (congestive heart failure)  Compression fracture: Aug 2015  Carbon monoxide poisonin  Pulmonary emphysema, unspecified emphysema type  Secondary hypertension  Atrial fibrillation, unspecified type  History of arthroscopy of knee  History of appendectomy:   History of right shoulder replacement:     Home Medications:  calcitonin nasal 200 intl units/inh spray: 1 spray(s) nasal in one nostril everyday, alternating each nostril.  (2018 21:25)  Eliquis 2.5 mg oral tablet: 1 tab(s) orally 2 times a day (2018 21:25)  furosemide 20 mg oral tablet: 1 tab(s) orally 2 times a day (2018 21:25)  losartan 50 mg oral tablet: 1 tab(s) orally once a day (2018 16:47)  metoprolol succinate 100 mg oral tablet, extended release: 1 tab(s) orally once a day (2018 21:25)  omeprazole 20 mg oral delayed release capsule: 1 cap(s) orally 2 times a day (2018 21:25)  Praluent Pen 75 mg/mL subcutaneous solution: subcutaneous 2 times a month (2018 21:25)  ProAir HFA 90 mcg/inh inhalation aerosol: Inhale 1 to 2 puffs every 4 to 6 hours as needed (2018 21:25)  Stiolto Respimat 2.5 mcg-2.5 mcg/inh inhalation aerosol: 2 puff(s) inhaled every 24 hours (2018 21:25)    CONSTITUTIONAL: admits generalized weakness which is improving since time of admission, no fevers or chills  EYES/ENT: No visual changes;  No vertigo or throat pain   NECK: admits neck discomfort, denies stiffness  RESPIRATORY: admits to shortness of breath this morning  CARDIOVASCULAR: admits to SOB  GASTROINTESTINAL: No abdominal pain. No nausea, vomiting, or hematemesis; No diarrhea or constipation. No melena or hematochezia.  GENITOURINARY: No dysuria, frequency or hematuria  NEUROLOGICAL: No numbness or weakness  MSK: Back pain  SKIN: No itching or rash    Vital Signs Last 24 Hrs  T(C): 36.9 (2018 04:19), Max: 37.2 (2018 23:23)  T(F): 98.5 (2018 04:19), Max: 99 (2018 23:23)  HR: 81 (2018 06:46) (79 - 106)  BP: 104/70 (2018 04:19) (91/56 - 106/66)  RR: 17 (2018 04:19) (16 - 18)  SpO2: 100% (2018 06:46) (76% - 100%)    Physical Exam      LABS:                         TPro  6.0  /  Alb  3.0<L>  /  TBili  0.6  /  DBili  x   /  AST  14<L>  /  ALT  19  /  AlkPhos  98      135  |  99  |  41<H>  ----------------------------<  157<H>  4.8   |  26  |  1.50<H>    Ca    8.6      2018 07:48    TPro  6.0  /  Alb  3.0<L>  /  TBili  0.6  /  DBili  x   /  AST  14<L>  /  ALT  19  /  AlkPhos  98      Radiology  XR CHEST PORTABLE URGENT 1V                            PROCEDURE DATE:  2018          INTERPRETATION:  CHF, follow-up.    AP chest. Prior 2018.    Very low lung volumes. No change heart mediastinum. Bibasilar   interstitial edema small hazy effusions consistent with history of CHF.   Question superimposed airspace infiltrate at the right base. Recommend   clinical correlation close follow-up. Remainder study unchanged.    Impression: As above        EXAM:  CT CHEST                            PROCEDURE DATE:  2018          INTERPRETATION:  CLINICAL INFORMATION:  Shortness of breath    PROCEDURE:  Using multislice helical CT, thin sections were obtained from   the thoracic inlet through the lung bases.    COMPARISON: Chest x-ray of earlier in the day and CT chest of 2018    FINDINGS:      There is no significant axillary, hilar, or mediastinal lymphadenopathy.   There is no pericardial effusion. There are small free-flowing pleural   effusions right greater than left There is atherosclerotic calcification   of the aorta and tracheobronchial calcification. There is coronary artery   calcification.    There is emphysema. There is bibasilar atelectasis. There are patchy   areas of infiltrate in the right upper lobe.    The osseous structures demonstrate osteopenia and degenerative change.    The upper abdomen is remarkable for extensive vascular calcifications   with rounded peripherally calcified lesion again identified in the spleen   there are probable small gallstones in the gallbladder.    IMPRESSION: Small right greater than left pleural effusions  Scattered patchy right upper lobe pulmonary infiltrates new from the   prior study  Emphysema  Stable peripherally calcified lesion in the spleen HPI:  83F w/pmh of COPD not on home 02, HTN, HLD, Afib on Eliquis, CHF, multiple compression fractures and ascending penetrating aortic ulcer presenting with sob for 3-4 days. States that she has been having increasing sob for the past few days. States that she also has nausea and regurgitated clear fluids around 3-4 days ago. Pt has felt increasing weakness and lethargy with decreased appetite as well. She has tried nebulizers and inhalers for her sob without relief of her symptoms. Patient is unable to lie down flat due to compression fractures and difficulty breathing. Denies headache, fever, chills, vomiting, cough, chest pain, palpitations, peripheral edema and abdominal pain. No sick contacts. No recent travel. She ambulates with a cane. Patient has a history of spasmodic dysphonia which she was previously getting botox shots for but had an episode of aspiration so she stopped the injections.    In ED, patient was hypotensive with BP of 88/53, improved to 106/60. Remainder of vitals were within normal range. CBC  unremarkable, BMP significant for hyponatremia. ProBNP was 25,370. RVP negative. CT chest ordered and pending. Preliminary read of EKG revealed afib with ventricular rate of 85. CXR showed small right pleural effusion, questionable infiltrate. CT chest revealed small right greater than left pleural effusions. Scattered patchy right upper lobe pulmonary infiltrates new from the prior study. Emphysema. Stable peripherally calcified lesion in the spleen. Patient given Rocephin and zithromax in ED. (2018 17:33)    	  INTERVAL HPI/OVERNIGHT EVENTS: Patient seen and examined at bedside. Rapid Response was called this morning because patient desaturated to 70s. Prior to RR being called, patient was desaturated, and placed on BIPAP and received duoneb treatment. O2 briefly sarah to mid 80s, then returned to mid 70s. RR was called, BIPAP settings were increased, O2 sats began to climb shortly after rapid was called, returning to high 90s.    MEDICATIONS  (STANDING):  ALBUTerol    0.083% 2.5 milliGRAM(s) Nebulizer every 8 hours  ALPRAZolam 0.25 milliGRAM(s) Oral two times a day  apixaban 2.5 milliGRAM(s) Oral every 12 hours  buDESOnide   0.5 milliGRAM(s) Respule 0.5 milliGRAM(s) Inhalation two times a day  diltiazem    Tablet 60 milliGRAM(s) Oral four times a day  furosemide   Injectable 40 milliGRAM(s) IV Push daily  metoprolol tartrate 25 milliGRAM(s) Oral two times a day  mirtazapine 7.5 milliGRAM(s) Oral at bedtime  pantoprazole    Tablet 40 milliGRAM(s) Oral before breakfast  predniSONE   Tablet 20 milliGRAM(s) Oral two times a day  tiotropium 2.5 MICROgram(s)/olodaterol 2.5 MICROgram(s) Inhaler 2 Puff(s) Inhalation daily    MEDICATIONS  (PRN):  acetaminophen   Tablet. 650 milliGRAM(s) Oral every 6 hours PRN Mild Pain (1 - 3)  ALBUTerol    90 MICROgram(s) HFA Inhaler 2 Puff(s) Inhalation every 6 hours PRN Bronchospasm  lidocaine   Patch 1 Patch Transdermal every 24 hours PRN right shoulder pain  morphine  - Injectable 0.25 milliGRAM(s) IV Push every 3 hours PRN resp distress, dyspnea, pain,    Allergies    No Known Allergies    Intolerances    PAST MEDICAL & SURGICAL HISTORY:  CHF (congestive heart failure)  Compression fracture: Aug 2015  Carbon monoxide poisonin  Pulmonary emphysema, unspecified emphysema type  Secondary hypertension  Atrial fibrillation, unspecified type  History of arthroscopy of knee  History of appendectomy:   History of right shoulder replacement:     Home Medications:  calcitonin nasal 200 intl units/inh spray: 1 spray(s) nasal in one nostril everyday, alternating each nostril.  (2018 21:25)  Eliquis 2.5 mg oral tablet: 1 tab(s) orally 2 times a day (2018 21:25)  furosemide 20 mg oral tablet: 1 tab(s) orally 2 times a day (2018 21:25)  losartan 50 mg oral tablet: 1 tab(s) orally once a day (2018 16:47)  metoprolol succinate 100 mg oral tablet, extended release: 1 tab(s) orally once a day (2018 21:25)  omeprazole 20 mg oral delayed release capsule: 1 cap(s) orally 2 times a day (2018 21:25)  Praluent Pen 75 mg/mL subcutaneous solution: subcutaneous 2 times a month (2018 21:25)  ProAir HFA 90 mcg/inh inhalation aerosol: Inhale 1 to 2 puffs every 4 to 6 hours as needed (2018 21:25)  Stiolto Respimat 2.5 mcg-2.5 mcg/inh inhalation aerosol: 2 puff(s) inhaled every 24 hours (2018 21:25)    CONSTITUTIONAL: admits generalized weakness which is improving since time of admission, no fevers or chills  EYES/ENT: No visual changes;  No vertigo or throat pain   NECK: admits neck discomfort, denies stiffness  RESPIRATORY: admits to shortness of breath this morning  CARDIOVASCULAR: admits to SOB  GASTROINTESTINAL: No abdominal pain. No nausea, vomiting, or hematemesis; No diarrhea or constipation. No melena or hematochezia.  GENITOURINARY: No dysuria, frequency or hematuria  NEUROLOGICAL: No numbness or weakness  MSK: Back pain  SKIN: No itching or rash    Vital Signs Last 24 Hrs  T(C): 36.9 (2018 04:19), Max: 37.2 (2018 23:23)  T(F): 98.5 (2018 04:19), Max: 99 (2018 23:23)  HR: 81 (2018 06:46) (79 - 106)  BP: 104/70 (2018 04:19) (91/56 - 106/66)  RR: 17 (2018 04:19) (16 - 18)  SpO2: 100% (2018 06:46) (76% - 100%)    Physical Exam  General: in mild respiratory distress on high flow oxygen  HEENT: NCAT, PERRLA, EOMI bl, moist mucous membranes   Neck: Supple, nontender, no mass  Neurology: A&Ox4, nonfocal, CN II-XII grossly intact, sensation intact  Respiratory: Decreased breath sounds in bilateral bases, increase work of breathing, now on High flow oxygen  CV: tachycardic, irregular rhythm. No murmurs  Abdominal: Soft, NT, ND +BSx4  Extremities: No C/C/E, + peripheral pulses  MSK: Normal ROM, no joint erythema or warmth, no joint swelling   Skin: warm, dry, normal color, no rash or abnormal lesions    LABS:                               10.2   9.1   )-----------( 348      ( 2018 07:13 )             31.0     04-    132<L>  |  97  |  42<H>  ----------------------------<  168<H>  5.2   |  26  |  1.50<H>    Ca    8.7      2018 07:13                    TPro  6.0  /  Alb  3.0<L>  /  TBili  0.6  /  DBili  x   /  AST  14<L>  /  ALT  19  /  AlkPhos  98        Radiology  XR CHEST PORTABLE URGENT 1V                            PROCEDURE DATE:  2018          INTERPRETATION:  CHF, follow-up.    AP chest. Prior 2018.    Very low lung volumes. No change heart mediastinum. Bibasilar   interstitial edema small hazy effusions consistent with history of CHF.   Question superimposed airspace infiltrate at the right base. Recommend   clinical correlation close follow-up. Remainder study unchanged.    Impression: As above        EXAM:  CT CHEST                            PROCEDURE DATE:  2018          INTERPRETATION:  CLINICAL INFORMATION:  Shortness of breath    PROCEDURE:  Using multislice helical CT, thin sections were obtained from   the thoracic inlet through the lung bases.    COMPARISON: Chest x-ray of earlier in the day and CT chest of 2018    FINDINGS:      There is no significant axillary, hilar, or mediastinal lymphadenopathy.   There is no pericardial effusion. There are small free-flowing pleural   effusions right greater than left There is atherosclerotic calcification   of the aorta and tracheobronchial calcification. There is coronary artery   calcification.    There is emphysema. There is bibasilar atelectasis. There are patchy   areas of infiltrate in the right upper lobe.    The osseous structures demonstrate osteopenia and degenerative change.    The upper abdomen is remarkable for extensive vascular calcifications   with rounded peripherally calcified lesion again identified in the spleen   there are probable small gallstones in the gallbladder.    IMPRESSION: Small right greater than left pleural effusions  Scattered patchy right upper lobe pulmonary infiltrates new from the   prior study  Emphysema  Stable peripherally calcified lesion in the spleen

## 2018-04-25 NOTE — PROGRESS NOTE ADULT - PROBLEM SELECTOR PLAN 1
pulm HTN, HFpEF, COPD, resp distress, CKD  multi organ failure  I and O  needs diuresis  oral and skin care  chest pt  NEBS  will check CXR this am  will check Procalcitonin and CRP and ProBNP  will assess need for ABX  supportive medical regimen and care  pt is DNR DNI  prognosis guarded to poor  will follow

## 2018-04-25 NOTE — PROGRESS NOTE ADULT - SUBJECTIVE AND OBJECTIVE BOX
HPI:  83F w/pmh of COPD not on home 02, HTN, HLD, Afib on Eliquis, CHF, multiple compression fractures and ascending penetrating aortic ulcer presenting with sob for 3-4 days. States that she has been having increasing sob for the past few days. States that she also has nausea and regurgitated clear fluids around 3-4 days ago. Pt has felt increasing weakness and lethargy with decreased appetite as well. She has tried nebulizers and inhalers for her sob without relief of her symptoms. Patient is unable to lie down flat due to compression fractures and difficulty breathing. Denies headache, fever, chills, vomiting, cough, chest pain, palpitations, peripheral edema and abdominal pain. No sick contacts. No recent travel. She ambulates with a cane. Patient has a history of spasmodic dysphonia which she was previously getting botox shots for but had an episode of aspiration so she stopped the injections.    In ED, patient was hypotensive with BP of 88/53, improved to 106/60. Remainder of vitals were within normal range. CBC  unremarkable, BMP significant for hyponatremia. ProBNP was 25,370. RVP negative. CT chest ordered and pending. Preliminary read of EKG revealed afib with ventricular rate of 85. CXR showed small right pleural effusion, questionable infiltrate. CT chest revealed small right greater than left pleural effusions. Scattered patchy right upper lobe pulmonary infiltrates new from the prior study. Emphysema. Stable peripherally calcified lesion in the spleen. Patient given Rocephin and zithromax in ED. (2018 17:33)      SUBJECTIVE: Pt. seen, examined and evaluated Pt. resting comfortably in bed in NAD, lying flat with no respiratory distress, no c/o chest pain, dyspnea, palpitations, PND, or orthopnea   Patient is a 83y old  Female who presents with a chief complaint of shortness of breath (2018 14:05)          OBJECTIVE:  Review Of Systems:  Constitutional: [ ] Fever [ ] Chills [ ] Fatigue [ ] Weight change   HEENT: [ ] Blurred vision [ ] Eye Pain [ ] Headache [ ] Runny nose [ ] Sore Throat   Respiratory: [ ] Cough [ ] Wheezing [ ] Shortness of breath  Cardiovascular: [ ] Chest Pain [ ] Palpitations [ ] HUFF [ ] PND [ ] Orthopnea  Gastrointestinal: [ ] Abdominal Pain [ ] Diarrhea [ ] Constipation [ ] Hemorrhoids [ ] Nausea [ ] Vomiting  Genitourinary: [ ] Nocturia [ ] Dysuria [ ] Incontinence  Extremities: [ ] Swelling [ ] Joint Pain  Neurologic: [ ] Focal deficit [ ] Paresthesias [ ] Syncope  Lymphatic: [ ] Swelling [ ] Lymphadenopathy   Skin: [ ] Rash [ ] Ecchymoses [ ] Wounds [ ] Lesions  Psychiatry: [ ] Depression [ ] Suicidal/Homicidal Ideation [ ] Anxiety [ ] Sleep Disturbances  [ ] 10 point review of systems is otherwise negative except as mentioned above              [ ]Unable to obtain    Allergy: No Known Allergies      Medications:  MEDICATIONS  (STANDING):  ALBUTerol    0.083% 2.5 milliGRAM(s) Nebulizer every 8 hours  ALPRAZolam 0.25 milliGRAM(s) Oral two times a day  apixaban 2.5 milliGRAM(s) Oral every 12 hours  buDESOnide   0.5 milliGRAM(s) Respule 0.5 milliGRAM(s) Inhalation two times a day  diltiazem    Tablet 60 milliGRAM(s) Oral four times a day  metoprolol tartrate 25 milliGRAM(s) Oral two times a day  mirtazapine 7.5 milliGRAM(s) Oral at bedtime  pantoprazole    Tablet 40 milliGRAM(s) Oral before breakfast  predniSONE   Tablet 20 milliGRAM(s) Oral two times a day  tiotropium 2.5 MICROgram(s)/olodaterol 2.5 MICROgram(s) Inhaler 2 Puff(s) Inhalation daily    MEDICATIONS  (PRN):  acetaminophen   Tablet. 650 milliGRAM(s) Oral every 6 hours PRN Mild Pain (1 - 3)  ALBUTerol    90 MICROgram(s) HFA Inhaler 2 Puff(s) Inhalation every 6 hours PRN Bronchospasm  lidocaine   Patch 1 Patch Transdermal every 24 hours PRN right shoulder pain  morphine  - Injectable 0.25 milliGRAM(s) IV Push every 3 hours PRN resp distress, dyspnea, pain,      PMH/PSH/FH/SH: [ ] Unchanged    Vitals:  T(C): 36.9 (18 @ 04:19), Max: 37.2 (18 @ 23:23)  HR: 81 (18 @ 06:46) (79 - 106)  BP: 104/70 (18 @ 04:19) (91/56 - 106/66)  BP(mean): --  RR: 17 (18 @ 04:19) (16 - 18)  SpO2: 100% (18 @ 06:46) (76% - 100%)  Wt(kg): --  Daily     Daily Weight in k.7 (2018 04:19)  I&O's Summary    2018 07:01  -  2018 07:00  --------------------------------------------------------  IN: 0 mL / OUT: 300 mL / NET: -300 mL        Labs:                  ECG:    Echo:  < from: TTE Echo Doppler w/o Cont (18 @ 10:50) >  OBSERVATIONS:    Mitral Valve: MAC, moderate to severe MR., may be underestimated. There   is superior displacement of the posterior mitral valve leaflet with   systole, without bindu prolapse.  Aortic Valve/Aorta: Calcified trileaflet aortic valve.  Tricuspid Valve: normal with moderate TR.  Pulmonic Valve: normal  Left Atrium: Enlarged  Right Atrium: Enlarged  Left Ventricle: normalLV size and systolic function, estimated LVEF of   65%.  Right Ventricle: The right ventricle is dilated. Systolic function cannot   be accurately assessed.  Pericardium/Pleura: Right pleural effusion, no significant pericardial   effusion.  Pulmonary/RV Pressure: estimated PA systolic pressure of 53 mmHg assuming   an RA pressure of 10 mmHg.  LV Diastolic Function: Indeterminate    Normal left ventricular size and systolic function, estimated LVEF of   65%. The right ventricle appears dilated with indeterminate systolic   function. Diastolic function is indeterminate. I atrial enlargement. The   aortic root is normal in size. Mitral annular calcification. There is   superior displacement of the posterior mitral valve leaflet with systole,   without bindu prolapse. There is at least moderate to severe MR. The   aortic valve is calcified without stenosis or insufficiency. Moderate TR.   Estimated PA systolic pressure is     53 mm Hg, assuming an RA pressure   of 10 mmHg. No significant pericardial effusion. Right pleural effusion                  Stress Testing:     Cath:    Imaging:  < from: CT Chest No Cont (18 @ 18:01) >  IMPRESSION: Small right greater than left pleural effusions  Scattered patchy right upper lobe pulmonary infiltrates new from the   prior study  Emphysema  Stable peripherally calcified lesion in the spleen            Interpretation of Telemetry: Overnight on telemetry afib 70's      Physical Exam:  Appearance: [ ] Normal  [ ] abnormal [ ] NAD   Eyes: [ ] PERRL [ ] EOMI  HENT: [ ] Normal [ ] Abnormal oral mucosa [ ]NC/AT  Cardiovascular: [ ] S1 [ ] S2 [ ] RRR [ ] m/r/g [ ]edema [ ] JVP  Procedural Access Site: [ ]  hematoma [ ] tender to palpation [ ] 2+ pulse [ ] bruit [ ] Ecchymosis  Respiratory: [ ] Clear to auscultation bilaterally  Gastrointestinal: [ ] Soft [ ] tenderness[ ] distension [ ] BS  Musculoskeletal: [ ] clubbing [ ] joint deformity   Neurologic: [ ] Non-focal  Lymphatic: [ ] lymphadenopathy  Psychiatry: [ ] AAOx3  [ ] confused [ ] disoriented [ ] Mood & affect appropriate  Skin: [ ]  rashes [ ] ecchymoses [ ] cyanosis HPI:  83F w/pmh of COPD not on home 02, HTN, HLD, Afib on Eliquis, CHF, multiple compression fractures and ascending penetrating aortic ulcer presenting with sob for 3-4 days. States that she has been having increasing sob for the past few days. States that she also has nausea and regurgitated clear fluids around 3-4 days ago. Pt has felt increasing weakness and lethargy with decreased appetite as well. She has tried nebulizers and inhalers for her sob without relief of her symptoms. Patient is unable to lie down flat due to compression fractures and difficulty breathing. Denies headache, fever, chills, vomiting, cough, chest pain, palpitations, peripheral edema and abdominal pain. No sick contacts. No recent travel. She ambulates with a cane. Patient has a history of spasmodic dysphonia which she was previously getting botox shots for but had an episode of aspiration so she stopped the injections.    In ED, patient was hypotensive with BP of 88/53, improved to 106/60. Remainder of vitals were within normal range. CBC  unremarkable, BMP significant for hyponatremia. ProBNP was 25,370. RVP negative. CT chest ordered and pending. Preliminary read of EKG revealed afib with ventricular rate of 85. CXR showed small right pleural effusion, questionable infiltrate. CT chest revealed small right greater than left pleural effusions. Scattered patchy right upper lobe pulmonary infiltrates new from the prior study. Emphysema. Stable peripherally calcified lesion in the spleen. Patient given Rocephin and zithromax in ED. (2018 17:33)      SUBJECTIVE: Pt. seen, examined and evaluated Pt. resting comfortably in bed in NAD on Hi-flow with no respiratory distress, no c/o chest pain, dyspnea, palpitations, PND, or orthopnea, daughter and sister at bedside   Patient is a 83y old  Female who presents with a chief complaint of shortness of breath (2018 14:05)          OBJECTIVE:  Review Of Systems:  Constitutional: [ ] Fever [ ] Chills [ ] Fatigue [ ] Weight change   HEENT: [ ] Blurred vision [ ] Eye Pain [ ] Headache [ ] Runny nose [ ] Sore Throat   Respiratory: [ ] Cough [ ] Wheezing [ ] Shortness of breath  Cardiovascular: [ ] Chest Pain [ ] Palpitations [ ] HUFF [ ] PND [ ] Orthopnea  Gastrointestinal: [ ] Abdominal Pain [ ] Diarrhea [ ] Constipation [ ] Hemorrhoids [ ] Nausea [ ] Vomiting  Genitourinary: [ ] Nocturia [ ] Dysuria [ ] Incontinence  Extremities: [ ] Swelling [ ] Joint Pain  Neurologic: [ ] Focal deficit [ ] Paresthesias [ ] Syncope  Lymphatic: [ ] Swelling [ ] Lymphadenopathy   Skin: [ ] Rash [ ] Ecchymoses [ ] Wounds [ ] Lesions  Psychiatry: [ ] Depression [ ] Suicidal/Homicidal Ideation [ ] Anxiety [ ] Sleep Disturbances  [X ] 10 point review of systems is otherwise negative except as mentioned above                 Allergy: No Known Allergies      Medications:  MEDICATIONS  (STANDING):  ALBUTerol    0.083% 2.5 milliGRAM(s) Nebulizer every 8 hours  ALPRAZolam 0.25 milliGRAM(s) Oral two times a day  apixaban 2.5 milliGRAM(s) Oral every 12 hours  buDESOnide   0.5 milliGRAM(s) Respule 0.5 milliGRAM(s) Inhalation two times a day  diltiazem    Tablet 60 milliGRAM(s) Oral four times a day  metoprolol tartrate 25 milliGRAM(s) Oral two times a day  mirtazapine 7.5 milliGRAM(s) Oral at bedtime  pantoprazole    Tablet 40 milliGRAM(s) Oral before breakfast  predniSONE   Tablet 20 milliGRAM(s) Oral two times a day  tiotropium 2.5 MICROgram(s)/olodaterol 2.5 MICROgram(s) Inhaler 2 Puff(s) Inhalation daily    MEDICATIONS  (PRN):  acetaminophen   Tablet. 650 milliGRAM(s) Oral every 6 hours PRN Mild Pain (1 - 3)  ALBUTerol    90 MICROgram(s) HFA Inhaler 2 Puff(s) Inhalation every 6 hours PRN Bronchospasm  lidocaine   Patch 1 Patch Transdermal every 24 hours PRN right shoulder pain  morphine  - Injectable 0.25 milliGRAM(s) IV Push every 3 hours PRN resp distress, dyspnea, pain,      PMH/PSH/FH/SH: [ ] Unchanged    Vitals:  T(C): 36.9 (18 @ 04:19), Max: 37.2 (18 @ 23:23)  HR: 81 (18 @ 06:46) (79 - 106)  BP: 104/70 (18 @ 04:19) (91/56 - 106/66)  BP(mean): --  RR: 17 (18 @ 04:19) (16 - 18)  SpO2: 100% (18 @ 06:46) (76% - 100%)  Wt(kg): --  Daily     Daily Weight in k.7 (2018 04:19)  I&O's Summary    2018 07:01  -  2018 07:00  --------------------------------------------------------  IN: 0 mL / OUT: 300 mL / NET: -300 mL        Labs:                          10.2   9.1   )-----------( 348      ( 2018 07:13 )             31.0       132<L>  |  97  |  42<H>  ----------------------------<  168<H>  5.2   |  26  |  1.50<H>    Ca    8.7      2018 07:13          ECG:    Echo:  < from: TTE Echo Doppler w/o Cont (18 @ 10:50) >  OBSERVATIONS:    Mitral Valve: MAC, moderate to severe MR., may be underestimated. There   is superior displacement of the posterior mitral valve leaflet with   systole, without bindu prolapse.  Aortic Valve/Aorta: Calcified trileaflet aortic valve.  Tricuspid Valve: normal with moderate TR.  Pulmonic Valve: normal  Left Atrium: Enlarged  Right Atrium: Enlarged  Left Ventricle: normalLV size and systolic function, estimated LVEF of   65%.  Right Ventricle: The right ventricle is dilated. Systolic function cannot   be accurately assessed.  Pericardium/Pleura: Right pleural effusion, no significant pericardial   effusion.  Pulmonary/RV Pressure: estimated PA systolic pressure of 53 mmHg assuming   an RA pressure of 10 mmHg.  LV Diastolic Function: Indeterminate    Normal left ventricular size and systolic function, estimated LVEF of   65%. The right ventricle appears dilated with indeterminate systolic   function. Diastolic function is indeterminate. I atrial enlargement. The   aortic root is normal in size. Mitral annular calcification. There is   superior displacement of the posterior mitral valve leaflet with systole,   without bindu prolapse. There is at least moderate to severe MR. The   aortic valve is calcified without stenosis or insufficiency. Moderate TR.   Estimated PA systolic pressure is     53 mm Hg, assuming an RA pressure   of 10 mmHg. No significant pericardial effusion. Right pleural effusion            Stress Testing:     Cath:    Imaging:  < from: CT Chest No Cont (18 @ 18:01) >  IMPRESSION: Small right greater than left pleural effusions  Scattered patchy right upper lobe pulmonary infiltrates new from the   prior study  Emphysema  Stable peripherally calcified lesion in the spleen            Interpretation of Telemetry: Overnight on telemetry afib 70's      Physical Exam:  Appearance: [ ] Normal  [ ] abnormal [X ] NAD   Eyes: [ ] PERRL [ ] EOMI  HENT: [ ] Normal [ ] Abnormal oral mucosa [ ]NC/AT  Cardiovascular: [X ] S1 [ X] S2 [ ] RRR [ ] m/r/g [ ]edema [ ] JVP  Procedural Access Site: [ ]  hematoma [ ] tender to palpation [ ] 2+ pulse [ ] bruit [ ] Ecchymosis  Respiratory: [X] diminished breath sounds   Gastrointestinal: [ ] Soft [ ] tenderness[ ] distension [ ] BS  Musculoskeletal: [ ] clubbing [ ] joint deformity   Neurologic: [ ] Non-focal  Lymphatic: [ ] lymphadenopathy  Psychiatry: [X ] AAOx3  [ ] confused [ ] disoriented [ ] Mood & affect appropriate  Skin: [ ]  rashes [ ] ecchymoses [ ] cyanosis

## 2018-04-25 NOTE — CHART NOTE - NSCHARTNOTEFT_GEN_A_CORE
Rapid Response 2 Hour Follow Up      Patient is a 83y old  Female who presents with a chief complaint of shortness of breath (20 Apr 2018 14:05)      Rapid response was called at on this 83y Female patient  COPD not on home 02, HTN, HLD, Afib on Eliquis, CHF, multiple compression fractures and ascending penetrating aortic ulcer presenting with sob for 3-4 days, admitted for CHF exacerbation with underlying copd for  hypoxia with O2 sats in 70s. Patient pulled off her BIPAP at approximately 2:30 am, then began to desat around 6:15. Was placed back on BIPAP by RT, recived duoneb treatment with pulmicort. RT increased BIPAP settings to 14/8. O2 briefly sarah to mid 80s, then returned to mid 70s, at which point rapid was called. Patient's O2 sats began to climb shortly after rapid was called, returning to high 90s.      Vitals During follow up      Vital Signs Last 24 Hrs  T(C): 36.9 (25 Apr 2018 04:19), Max: 37.2 (24 Apr 2018 23:23)  T(F): 98.5 (25 Apr 2018 04:19), Max: 99 (24 Apr 2018 23:23)  HR: 81 (25 Apr 2018 06:46) (79 - 106)  BP: 104/70 (25 Apr 2018 04:19) (91/56 - 106/66)  RR: 17 (25 Apr 2018 04:19) (16 - 18)  SpO2: 100% (25 Apr 2018 06:46) (76% - 100%)    Physical Exam:  General: in mild respiratory distress on BIPAP  HEENT: NCAT, PERRLA, EOMI bl, moist mucous membranes   Neck: Supple, nontender, no mass  Neurology: A&Ox4, nonfocal, CN II-XII grossly intact, sensation intact  Respiratory: Decreased breath sounds in bilateral bases, no increase work of breathing  CV: tachycardic, irregular rhythm. No murmurs  Abdominal: Soft, NT, ND +BSx4  Extremities: No C/C/E, + peripheral pulses  MSK: Normal ROM, no joint erythema or warmth, no joint swelling   Skin: warm, dry, normal color, no rash or abnormal lesions    LABS:                        9.7    9.0   )-----------( 330      ( 24 Apr 2018 06:55 )             29.8     24 Apr 2018 06:55    134    |  98     |  44     ----------------------------<  150    4.9     |  28     |  1.70     Ca    8.8        24 Apr 2018 06:55      CAPILLARY BLOOD GLUCOSE      POCT Blood Glucose.: 239 mg/dL (25 Apr 2018 06:38)            Assessment/Plan:  83F w/pmh of COPD not on home 02, HTN, HLD, Afib on Eliquis, CHF, multiple compression fractures and ascending penetrating aortic ulcer presenting with sob for 3-4 days, admitted for CHF exacerbation with underlying copd with RR called for hypoxia with O2 sats in 70s on BIPAP. Patient improved shortly after rapid was called  Likely 2/2 to COPD exacerbation  On BIPAP with increased settings  Received duonebs and pulmicort.  -Will continue to follow, RN to call if any changes. Rapid Response 2 Hour Follow Up      Patient is a 83y old  Female who presents with a chief complaint of shortness of breath (20 Apr 2018 14:05)      Rapid response was called at on this 83y Female patient  COPD not on home 02, HTN, HLD, Afib on Eliquis, CHF, multiple compression fractures and ascending penetrating aortic ulcer presenting with sob for 3-4 days, admitted for CHF exacerbation with underlying copd for  hypoxia with O2 sats in 70s. Patient pulled off her BIPAP at approximately 2:30 am, then began to desat around 6:15. Was placed back on BIPAP by RT, recived duoneb treatment with pulmicort. RT increased BIPAP settings to 14/8. O2 briefly sarah to mid 80s, then returned to mid 70s, at which point rapid was called. Patient's O2 sats began to climb shortly after rapid was called, returning to high 90s.      Vitals During follow up  BP: 119/69  O2 sat: 93% on BIPAP  HR: 71    Vital Signs Last 24 Hrs  T(C): 36.9 (25 Apr 2018 04:19), Max: 37.2 (24 Apr 2018 23:23)  T(F): 98.5 (25 Apr 2018 04:19), Max: 99 (24 Apr 2018 23:23)  HR: 81 (25 Apr 2018 06:46) (79 - 106)  BP: 104/70 (25 Apr 2018 04:19) (91/56 - 106/66)  RR: 17 (25 Apr 2018 04:19) (16 - 18)  SpO2: 100% (25 Apr 2018 06:46) (76% - 100%)    Physical Exam:  General: in mild respiratory distress on BIPAP  HEENT: NCAT, PERRLA, EOMI bl, moist mucous membranes   Neck: Supple, nontender, no mass  Neurology: A&Ox4, nonfocal, CN II-XII grossly intact, sensation intact  Respiratory: Decreased breath sounds in bilateral bases, no increase work of breathing  CV: tachycardic, irregular rhythm. No murmurs  Abdominal: Soft, NT, ND +BSx4  Extremities: No C/C/E, + peripheral pulses  MSK: Normal ROM, no joint erythema or warmth, no joint swelling   Skin: warm, dry, normal color, no rash or abnormal lesions    LABS:                        9.7    9.0   )-----------( 330      ( 24 Apr 2018 06:55 )             29.8     24 Apr 2018 06:55    134    |  98     |  44     ----------------------------<  150    4.9     |  28     |  1.70     Ca    8.8        24 Apr 2018 06:55      CAPILLARY BLOOD GLUCOSE      POCT Blood Glucose.: 239 mg/dL (25 Apr 2018 06:38)      Assessment/Plan:  83F w/pmh of COPD not on home 02, HTN, HLD, Afib on Eliquis, CHF, multiple compression fractures and ascending penetrating aortic ulcer presenting with sob for 3-4 days, admitted for CHF exacerbation with underlying copd with RR called for hypoxia with O2 sats in 70s on BIPAP. Patient improved shortly after rapid was called  Likely 2/2 to COPD exacerbation  On BIPAP with increased settings  Received duonebs and pulmicort.  -Will continue to follow, RN to call if any changes.

## 2018-04-26 ENCOUNTER — APPOINTMENT (OUTPATIENT)
Dept: CARDIOLOGY | Facility: CLINIC | Age: 83
End: 2018-04-26

## 2018-04-26 LAB
ANION GAP SERPL CALC-SCNC: 10 MMOL/L — SIGNIFICANT CHANGE UP (ref 5–17)
BUN SERPL-MCNC: 40 MG/DL — HIGH (ref 7–23)
CALCIUM SERPL-MCNC: 8.3 MG/DL — LOW (ref 8.5–10.1)
CHLORIDE SERPL-SCNC: 97 MMOL/L — SIGNIFICANT CHANGE UP (ref 96–108)
CO2 SERPL-SCNC: 30 MMOL/L — SIGNIFICANT CHANGE UP (ref 22–31)
CREAT SERPL-MCNC: 1.5 MG/DL — HIGH (ref 0.5–1.3)
GLUCOSE SERPL-MCNC: 144 MG/DL — HIGH (ref 70–99)
HCT VFR BLD CALC: 31.4 % — LOW (ref 34.5–45)
HGB BLD-MCNC: 10.5 G/DL — LOW (ref 11.5–15.5)
MCHC RBC-ENTMCNC: 33.5 GM/DL — SIGNIFICANT CHANGE UP (ref 32–36)
MCHC RBC-ENTMCNC: 34.8 PG — HIGH (ref 27–34)
MCV RBC AUTO: 103.9 FL — HIGH (ref 80–100)
PLATELET # BLD AUTO: 315 K/UL — SIGNIFICANT CHANGE UP (ref 150–400)
POTASSIUM SERPL-MCNC: 3.8 MMOL/L — SIGNIFICANT CHANGE UP (ref 3.5–5.3)
POTASSIUM SERPL-SCNC: 3.8 MMOL/L — SIGNIFICANT CHANGE UP (ref 3.5–5.3)
RBC # BLD: 3.03 M/UL — LOW (ref 3.8–5.2)
RBC # FLD: 15.3 % — HIGH (ref 10.3–14.5)
SODIUM SERPL-SCNC: 137 MMOL/L — SIGNIFICANT CHANGE UP (ref 135–145)
WBC # BLD: 12.8 K/UL — HIGH (ref 3.8–10.5)
WBC # FLD AUTO: 12.8 K/UL — HIGH (ref 3.8–10.5)

## 2018-04-26 PROCEDURE — 99232 SBSQ HOSP IP/OBS MODERATE 35: CPT

## 2018-04-26 PROCEDURE — 99233 SBSQ HOSP IP/OBS HIGH 50: CPT | Mod: GC

## 2018-04-26 RX ADMIN — Medication 20 MILLIGRAM(S): at 06:24

## 2018-04-26 RX ADMIN — ALBUTEROL 2.5 MILLIGRAM(S): 90 AEROSOL, METERED ORAL at 15:29

## 2018-04-26 RX ADMIN — Medication 20 MILLIGRAM(S): at 17:37

## 2018-04-26 RX ADMIN — PANTOPRAZOLE SODIUM 40 MILLIGRAM(S): 20 TABLET, DELAYED RELEASE ORAL at 06:24

## 2018-04-26 RX ADMIN — APIXABAN 2.5 MILLIGRAM(S): 2.5 TABLET, FILM COATED ORAL at 17:37

## 2018-04-26 RX ADMIN — Medication 0.5 MILLIGRAM(S): at 19:40

## 2018-04-26 RX ADMIN — PIPERACILLIN AND TAZOBACTAM 25 GRAM(S): 4; .5 INJECTION, POWDER, LYOPHILIZED, FOR SOLUTION INTRAVENOUS at 06:24

## 2018-04-26 RX ADMIN — PIPERACILLIN AND TAZOBACTAM 25 GRAM(S): 4; .5 INJECTION, POWDER, LYOPHILIZED, FOR SOLUTION INTRAVENOUS at 13:06

## 2018-04-26 RX ADMIN — ALBUTEROL 2.5 MILLIGRAM(S): 90 AEROSOL, METERED ORAL at 19:40

## 2018-04-26 RX ADMIN — Medication 25 MILLIGRAM(S): at 06:00

## 2018-04-26 RX ADMIN — APIXABAN 2.5 MILLIGRAM(S): 2.5 TABLET, FILM COATED ORAL at 06:24

## 2018-04-26 RX ADMIN — Medication 25 MILLIGRAM(S): at 17:37

## 2018-04-26 RX ADMIN — ALBUTEROL 2.5 MILLIGRAM(S): 90 AEROSOL, METERED ORAL at 07:59

## 2018-04-26 RX ADMIN — Medication 40 MILLIGRAM(S): at 06:24

## 2018-04-26 RX ADMIN — PIPERACILLIN AND TAZOBACTAM 25 GRAM(S): 4; .5 INJECTION, POWDER, LYOPHILIZED, FOR SOLUTION INTRAVENOUS at 21:55

## 2018-04-26 RX ADMIN — MIRTAZAPINE 7.5 MILLIGRAM(S): 45 TABLET, ORALLY DISINTEGRATING ORAL at 21:55

## 2018-04-26 RX ADMIN — Medication 0.5 MILLIGRAM(S): at 07:59

## 2018-04-26 RX ADMIN — Medication 40 MILLIGRAM(S): at 17:37

## 2018-04-26 NOTE — PROGRESS NOTE ADULT - SUBJECTIVE AND OBJECTIVE BOX
Date/Time Patient Seen:  		  Referring MD:   Data Reviewed	       Patient is a 83y old  Female who presents with a chief complaint of shortness of breath (2018 14:05)    in bed  seen and examined  vs and meds reviewed    Subjective/HPI     PAST MEDICAL & SURGICAL HISTORY:  CHF (congestive heart failure)  Compression fracture: Aug 2015  Carbon monoxide poisonin  Pulmonary emphysema, unspecified emphysema type  Secondary hypertension  Atrial fibrillation, unspecified type  History of arthroscopy of knee  History of appendectomy:   History of right shoulder replacement:   S/P appy        Medication list         MEDICATIONS  (STANDING):  ALBUTerol    0.083% 2.5 milliGRAM(s) Nebulizer every 8 hours  apixaban 2.5 milliGRAM(s) Oral every 12 hours  buDESOnide   0.5 milliGRAM(s) Respule 0.5 milliGRAM(s) Inhalation two times a day  diltiazem    Tablet 60 milliGRAM(s) Oral four times a day  furosemide   Injectable 40 milliGRAM(s) IV Push two times a day  metoprolol tartrate 25 milliGRAM(s) Oral two times a day  mirtazapine 7.5 milliGRAM(s) Oral at bedtime  pantoprazole    Tablet 40 milliGRAM(s) Oral before breakfast  piperacillin/tazobactam IVPB. 3.375 Gram(s) IV Intermittent every 8 hours  predniSONE   Tablet 20 milliGRAM(s) Oral two times a day  tiotropium 2.5 MICROgram(s)/olodaterol 2.5 MICROgram(s) Inhaler 2 Puff(s) Inhalation daily    MEDICATIONS  (PRN):  acetaminophen   Tablet. 650 milliGRAM(s) Oral every 6 hours PRN Mild Pain (1 - 3)  ALBUTerol    90 MICROgram(s) HFA Inhaler 2 Puff(s) Inhalation every 6 hours PRN Bronchospasm  lidocaine   Patch 1 Patch Transdermal every 24 hours PRN right shoulder pain  morphine  - Injectable 0.25 milliGRAM(s) IV Push every 3 hours PRN resp distress, dyspnea, pain,         Vitals log        ICU Vital Signs Last 24 Hrs  T(C): 36.4 (2018 04:54), Max: 36.8 (2018 11:05)  T(F): 97.6 (2018 04:54), Max: 98.2 (2018 11:05)  HR: 81 (2018 04:54) (70 - 91)  BP: 109/72 (2018 04:54) (102/63 - 115/66)  BP(mean): --  ABP: --  ABP(mean): --  RR: 18 (2018 04:54) (16 - 18)  SpO2: 100% (2018 04:54) (76% - 100%)           Input and Output:  I&O's Detail    2018 07:  -  2018 07:00  --------------------------------------------------------  IN:  Total IN: 0 mL    OUT:    Voided: 300 mL  Total OUT: 300 mL    Total NET: -300 mL          Lab Data                        10.2   9.1   )-----------( 348      ( 2018 07:13 )             31.0     04-    132<L>  |  97  |  42<H>  ----------------------------<  168<H>  5.2   |  26  |  1.50<H>    Ca    8.7      2018 07:13      ABG - ( 2018 15:52 )  pH, Arterial: 7.45  pH, Blood: x     /  pCO2: 39    /  pO2: 53    / HCO3: 27    / Base Excess: 3.4   /  SaO2: 91                      Review of Systems	      Objective     Physical Examination    frail  weak  on bipap  lung dec BS  abd soft  daughter at the BS      Pertinent Lab findings & Imaging      Elpidio:  NO   Adequate UO     I&O's Detail    2018 07:  -  2018 07:00  --------------------------------------------------------  IN:  Total IN: 0 mL    OUT:    Voided: 300 mL  Total OUT: 300 mL    Total NET: -300 mL               Discussed with:     Cultures:	        Radiology

## 2018-04-26 NOTE — SWALLOW BEDSIDE ASSESSMENT ADULT - SWALLOW EVAL: RECOMMENDED FEEDING/EATING TECHNIQUES
alternate food with liquid/hard swallow w/ each bite or sip/position upright (90 degrees)/maintain upright posture during/after eating for 30 mins/check mouth frequently for oral residue/pocketing/crush medication (when feasible)

## 2018-04-26 NOTE — SWALLOW BEDSIDE ASSESSMENT ADULT - COMMENTS
84 y/o female admitted  Acute respiratory failure with hypoxia. Pt seen awake, alert, on high flow 02 nasal cannula  Pt c discoordinated breathe swallow pattern increasing aspiration risk. Small sips, frequent breaks to maintain resp rate during meals

## 2018-04-26 NOTE — PROGRESS NOTE ADULT - SUBJECTIVE AND OBJECTIVE BOX
[facility] -- Daniella Kenyon, Ghanshyam Vaca Pannella, Patel, Savella  Office # [phone]      Follow Up:      Subjective/Observations:       REVIEW OF SYSTEMS: All other review of systems is negative unless indicated above    PAST MEDICAL & SURGICAL HISTORY:  CHF (congestive heart failure)  Compression fracture: Aug 2015  Carbon monoxide poisonin  Pulmonary emphysema, unspecified emphysema type  Secondary hypertension  Atrial fibrillation, unspecified type  History of arthroscopy of knee  History of appendectomy:   History of right shoulder replacement:       MEDICATIONS  (STANDING):  ALBUTerol    0.083% 2.5 milliGRAM(s) Nebulizer every 8 hours  apixaban 2.5 milliGRAM(s) Oral every 12 hours  buDESOnide   0.5 milliGRAM(s) Respule 0.5 milliGRAM(s) Inhalation two times a day  diltiazem    Tablet 60 milliGRAM(s) Oral four times a day  furosemide   Injectable 40 milliGRAM(s) IV Push two times a day  metoprolol tartrate 25 milliGRAM(s) Oral two times a day  mirtazapine 7.5 milliGRAM(s) Oral at bedtime  pantoprazole    Tablet 40 milliGRAM(s) Oral before breakfast  piperacillin/tazobactam IVPB. 3.375 Gram(s) IV Intermittent every 8 hours  predniSONE   Tablet 20 milliGRAM(s) Oral two times a day  tiotropium 2.5 MICROgram(s)/olodaterol 2.5 MICROgram(s) Inhaler 2 Puff(s) Inhalation daily    MEDICATIONS  (PRN):  acetaminophen   Tablet. 650 milliGRAM(s) Oral every 6 hours PRN Mild Pain (1 - 3)  ALBUTerol    90 MICROgram(s) HFA Inhaler 2 Puff(s) Inhalation every 6 hours PRN Bronchospasm  lidocaine   Patch 1 Patch Transdermal every 24 hours PRN right shoulder pain  morphine  - Injectable 0.25 milliGRAM(s) IV Push every 3 hours PRN resp distress, dyspnea, pain,      Allergies    No Known Allergies    Intolerances            Vital Signs Last 24 Hrs  T(C): 37 (2018 11:55), Max: 37 (2018 11:55)  T(F): 98.6 (2018 11:55), Max: 98.6 (2018 11:55)  HR: 86 (2018 11:55) (76 - 88)  BP: 104/65 (2018 11:55) (102/63 - 115/66)  BP(mean): --  RR: 16 (2018 11:55) (16 - 18)  SpO2: 99% (2018 11:55) (86% - 100%)    I&O's Summary    2018 07:01  -  2018 14:06  --------------------------------------------------------  IN: 0 mL / OUT: 300 mL / NET: -300 mL          PHYSICAL EXAM:  TELE: AF 70s-90s no events  Constitutional: NAD, lethargic, frail   HEENT: Moist Mucous Membranes, Anicteric  Pulmonary: Non-labored, breath sounds diminished bilateral bases with kyphosis of her thoracic spine  Cardiovascular: irregular, S1 and S2, No murmurs, rubs, gallops or clicks  Gastrointestinal: Bowel Sounds present, soft, nontender.   Lymph: No peripheral edema. No lymphadenopathy.  Skin: No visible rashes or ulcers.  Psych:  Mood & affect not assessed as pt was lethargic    LABS: All Labs Reviewed:                        10.5   12.8  )-----------( 315      ( 2018 07:09 )             31.4                         10.2   9.1   )-----------( 348      ( 2018 07:13 )             31.0                         9.7    9.0   )-----------( 330      ( 2018 06:55 )             29.8     2018 07:09    137    |  97     |  40     ----------------------------<  144    3.8     |  30     |  1.50   2018 07:13    132    |  97     |  42     ----------------------------<  168    5.2     |  26     |  1.50   2018 06:55    134    |  98     |  44     ----------------------------<  150    4.9     |  28     |  1.70     Ca    8.3        2018 07:09  Ca    8.7        2018 07:13  Ca    8.8        2018 06:55    < from: TTE Echo Doppler w/o Cont (18 @ 10:50) >   EXAM:  ECHO TTE W/O CON COMP W/DOPPLR         PROCEDURE DATE:  2018        INTERPRETATION:  INDICATION: Mitral valve disease    Blood Pressure 103/64    Height 152     Weight 46       BSA 1.36    Dimensions:    LA 4.3       Normal Values: 2.0 - 4.0 cm    Ao 3.1        Normal Values: 2.0 - 3.8 cm  SEPTUM 0.7       Normal Values: 0.6 - 1.2 cm  PWT 0.7       Normal Values: 0.6 - 1.1 cm  LVIDd 3.9         Normal Values: 3.0 - 5.6 cm  LVIDs 2.1         Normal Values: 1.8 - 4.0 cm    Derived Variables:  LVMI     g/m2  RWT      Fractional Short      Ejection Fraction 65    Doppler Peak v. AoV=   (m/sec)    OBSERVATIONS:    Mitral Valve: MAC, moderate to severe MR., may be underestimated. There   is superior displacement of the posterior mitral valve leaflet with   systole, without bindu prolapse.  Aortic Valve/Aorta: Calcified trileaflet aortic valve.  Tricuspid Valve: normal with moderate TR.  Pulmonic Valve: normal  Left Atrium: Enlarged  Right Atrium: Enlarged  Left Ventricle: normalLV size and systolic function, estimated LVEF of   65%.  Right Ventricle: The right ventricle is dilated. Systolic function cannot   be accurately assessed.  Pericardium/Pleura: Right pleural effusion, no significant pericardial   effusion.  Pulmonary/RV Pressure: estimated PA systolic pressure of 53 mmHg assuming   an RA pressure of 10 mmHg.  LV Diastolic Function: Indeterminate    Normal left ventricular size and systolic function, estimated LVEF of   65%. The right ventricle appears dilated with indeterminate systolic   function. Diastolic function is indeterminate. I atrial enlargement. The   aortic root is normal in size. Mitral annular calcification. There is   superior displacement of the posterior mitral valve leaflet with systole,   without bindu prolapse. There is at least moderate to severe MR. The   aortic valve is calcified without stenosis or insufficiency. Moderate TR.   Estimated PA systolic pressure is     53 mm Hg, assuming an RA pressure   of 10 mmHg. No significant pericardial effusion. Right pleural effusion                  ANDIE VALLEJO M.D., ATTENDING CARDIOLOGIST  This document has been electronically signed. 2018  9:01AM        < end of copied text >    < from: Xray Chest 1 View- PORTABLE-Urgent (18 @ 08:04) >  EXAM:  XR CHEST PORTABLE URGENT 1V                            PROCEDURE DATE:  2018          INTERPRETATION:  Clinical information: Respiratory distress. Evaluate for   aspiration or pulmonary edema.    Technique: Frontal view of the chest.    Comparison: Prior chest x-ray examination from 2018.    Findings: Pulmonary edema and/or vascular congestion appears unchanged.   Small bilateral pleural effusions appear unchanged. The heart size is at   the upper limits of normal. There are mild multilevel degenerative   changes of the thoracic spine. A right-sided shoulder replacement is   again noted.    A rim calcified rounded structure within the left upper quadrant of the   abdomen appears unchanged.    IMPRESSION: As above, no interval change.                YING GUAN M.D., ATTENDING RADIOLOGIST  This document has been electronically signed. 2018  9:00AM        < end of copied text >

## 2018-04-26 NOTE — PROGRESS NOTE ADULT - PROBLEM SELECTOR PLAN 4
-CT revealed small right greater than left pleural effusions. Scattered patchy right upper lobe pulmonary infiltrates new from the prior study.  -Patient has been desaturating  -Procalcitonin elavted  -Restarted on zosyn -Suspected aspiration pneumonia, ABG consistent with hypoxia, mild leukocytosis  -CT revealed small right greater than left pleural effusions. Scattered patchy right upper lobe pulmonary infiltrates new from the prior study.  -Patient has been desaturating  -Procalcitonin elavated  -Continue on zosyn  -Speech and swallow to evaluate

## 2018-04-26 NOTE — PROGRESS NOTE ADULT - PROBLEM SELECTOR PLAN 1
heart failure, copd, atelectasis, possible lower resp tract infection, frailty, resp distress  I and O  on lasix, cardio follow up, monitor labs, replete lytes  cont systemic steroids, cont NEBS, cont Spiriva Stiolto  monitor vs and Sat and HD  pt is DNR DNI  prognosis guarded to poor  discussed with daughter  alternate o2 support with High Flow NC and BIPAP  no reason to do ABG serially, monitor sat and correlate with clinical status  CXR from yesterday reviewed  biomarkers reviewed  emp ABX started - for poss lower resp tract infection

## 2018-04-26 NOTE — PROGRESS NOTE ADULT - PROBLEM SELECTOR PLAN 7
Hgb dropped from 12 to 10.3, now   Likely secondary to chronic disease  No evidence of bleed  iron panel showed elevated ferritin, rest of panel normal, normal b12 and folate  Monitor h/h daily Hgb dropped from 12 to 10.5, now   Likely secondary to chronic disease  No evidence of bleed  iron panel showed elevated ferritin, rest of panel normal, normal b12 and folate  Monitor h/h daily

## 2018-04-26 NOTE — PROGRESS NOTE ADULT - PROBLEM SELECTOR PLAN 3
No RVR reported overnight on tele.   Holding parameters were decreased for cardizem and metoprolol as patient was not receiving medications because BP was low. If BP continues to be low, will consider switching to digoxin, continue cardizem 60 QID and metoprolol for now  -[id number] score of 5, continue anticoagulation with Eliquis 2.5 BID, she meets criteria for 5 bid, though continue 2.5 for now given her drop in Hb). Watch Hb

## 2018-04-26 NOTE — PROGRESS NOTE ADULT - ASSESSMENT
83F w/pmh of COPD not on home 02, HTN, HLD, Afib on Eliquis, CHF, multiple compression fractures and ascending penetrating aortic ulcer presenting with sob for 3-4 days, admitted for CHF exacerbation with underyling copd. rAPID Rapid response called because patient desat. to 60s and was in afib with RVR.

## 2018-04-26 NOTE — PROGRESS NOTE ADULT - PROBLEM SELECTOR PLAN 6
-Patient fluctuates between normal cr and DENISE. Unclear if patient has CKD at baseline or has frequent DENISE.  Improving  Restarted on Lasix  Monitor Cr for improvement -Patient fluctuates between normal cr and DENISE. Unclear if patient has CKD at baseline or has frequent DENISE.  Cr stable at 1.5  Restarted on Lasix  Monitor Cr for improvement

## 2018-04-26 NOTE — PROGRESS NOTE ADULT - PROBLEM SELECTOR PLAN 1
-Patient desaturated again this morning, RR called, placed on BIPAP, the switched to high flow as patient cannot tolerate BIPAP. This is likely secondary to fluid overload  -BNP>30,000, increased from prior  -Continue on Lasix 40 IV BID: per cardio recs  -Monitor kidney function while on lasix  -Continue with albuterol, tiotropium, budesonide, duonebs  -PT recommends subacute rehab -Patient continues to desaturate likely secondary to fluid overload, chronic aspiration, and COPD, is currently on high flow oxygen and BIPAP at night   -BNP>30,000, increased from prior  -Continue on Lasix 40 IV BID: per cardio recs  -Continue zosyn as aspiration is suspected  -Monitor kidney function while on lasix  -Continue with albuterol, tiotropium, budesonide, duonebs  -Speech and Swallow to evaluate  -PT recommends subacute rehab

## 2018-04-26 NOTE — PROGRESS NOTE ADULT - PROBLEM SELECTOR PLAN 5
ABG consistent with hypercapnic (co2 51, o2 78)  Assess need for home O2  Continue tiotroprium, albuterol, budesonide  D.C solumedrol, started on prednisone 20 BID ABG consistent hypoxia  Assess need for home O2  Continue tiotroprium, albuterol, budesonide  Continue on prednisone 20 BID

## 2018-04-26 NOTE — PROGRESS NOTE ADULT - SUBJECTIVE AND OBJECTIVE BOX
HPI:  83F w/pmh of COPD not on home 02, HTN, HLD, Afib on Eliquis, CHF, multiple compression fractures and ascending penetrating aortic ulcer presenting with sob for 3-4 days. States that she has been having increasing sob for the past few days. States that she also has nausea and regurgitated clear fluids around 3-4 days ago. Pt has felt increasing weakness and lethargy with decreased appetite as well. She has tried nebulizers and inhalers for her sob without relief of her symptoms. Patient is unable to lie down flat due to compression fractures and difficulty breathing. Denies headache, fever, chills, vomiting, cough, chest pain, palpitations, peripheral edema and abdominal pain. No sick contacts. No recent travel. She ambulates with a cane. Patient has a history of spasmodic dysphonia which she was previously getting botox shots for but had an episode of aspiration so she stopped the injections.    In ED, patient was hypotensive with BP of 88/53, improved to 106/60. Remainder of vitals were within normal range. CBC  unremarkable, BMP significant for hyponatremia. ProBNP was 25,370. RVP negative. CT chest ordered and pending. Preliminary read of EKG revealed afib with ventricular rate of 85. CXR showed small right pleural effusion, questionable infiltrate. CT chest revealed small right greater than left pleural effusions. Scattered patchy right upper lobe pulmonary infiltrates new from the prior study. Emphysema. Stable peripherally calcified lesion in the spleen. Patient given Rocephin and zithromax in ED. (2018 17:33)    	  INTERVAL HPI/OVERNIGHT EVENTS: Patient seen and examined at bedside. This morning, O2 sat decreased to 89.    MEDICATIONS  (STANDING):  ALBUTerol    0.083% 2.5 milliGRAM(s) Nebulizer every 8 hours  apixaban 2.5 milliGRAM(s) Oral every 12 hours  buDESOnide   0.5 milliGRAM(s) Respule 0.5 milliGRAM(s) Inhalation two times a day  diltiazem    Tablet 60 milliGRAM(s) Oral four times a day  furosemide   Injectable 40 milliGRAM(s) IV Push two times a day  metoprolol tartrate 25 milliGRAM(s) Oral two times a day  mirtazapine 7.5 milliGRAM(s) Oral at bedtime  pantoprazole    Tablet 40 milliGRAM(s) Oral before breakfast  piperacillin/tazobactam IVPB. 3.375 Gram(s) IV Intermittent every 8 hours  predniSONE   Tablet 20 milliGRAM(s) Oral two times a day  tiotropium 2.5 MICROgram(s)/olodaterol 2.5 MICROgram(s) Inhaler 2 Puff(s) Inhalation daily    MEDICATIONS  (PRN):  acetaminophen   Tablet. 650 milliGRAM(s) Oral every 6 hours PRN Mild Pain (1 - 3)  ALBUTerol    90 MICROgram(s) HFA Inhaler 2 Puff(s) Inhalation every 6 hours PRN Bronchospasm  lidocaine   Patch 1 Patch Transdermal every 24 hours PRN right shoulder pain  morphine  - Injectable 0.25 milliGRAM(s) IV Push every 3 hours PRN resp distress, dyspnea, pain,  Allergies    No Known Allergies    Intolerances    PAST MEDICAL & SURGICAL HISTORY:  CHF (congestive heart failure)  Compression fracture: Aug 2015  Carbon monoxide poisonin  Pulmonary emphysema, unspecified emphysema type  Secondary hypertension  Atrial fibrillation, unspecified type  History of arthroscopy of knee  History of appendectomy:   History of right shoulder replacement:     Home Medications:  calcitonin nasal 200 intl units/inh spray: 1 spray(s) nasal in one nostril everyday, alternating each nostril.  (2018 21:25)  Eliquis 2.5 mg oral tablet: 1 tab(s) orally 2 times a day (2018 21:25)  furosemide 20 mg oral tablet: 1 tab(s) orally 2 times a day (2018 21:25)  losartan 50 mg oral tablet: 1 tab(s) orally once a day (2018 16:47)  metoprolol succinate 100 mg oral tablet, extended release: 1 tab(s) orally once a day (2018 21:25)  omeprazole 20 mg oral delayed release capsule: 1 cap(s) orally 2 times a day (2018 21:25)  Praluent Pen 75 mg/mL subcutaneous solution: subcutaneous 2 times a month (2018 21:25)  ProAir HFA 90 mcg/inh inhalation aerosol: Inhale 1 to 2 puffs every 4 to 6 hours as needed (2018 21:25)  Stiolto Respimat 2.5 mcg-2.5 mcg/inh inhalation aerosol: 2 puff(s) inhaled every 24 hours (2018 21:25)    CONSTITUTIONAL: admits generalized weakness which is improving since time of admission, no fevers or chills  EYES/ENT: No visual changes;  No vertigo or throat pain   NECK: admits neck discomfort, denies stiffness  RESPIRATORY: admits to shortness of breath this morning  CARDIOVASCULAR: admits to SOB  GASTROINTESTINAL: No abdominal pain. No nausea, vomiting, or hematemesis; No diarrhea or constipation. No melena or hematochezia.  GENITOURINARY: No dysuria, frequency or hematuria  NEUROLOGICAL: No numbness or weakness  MSK: Back pain  SKIN: No itching or rash    Vital Signs Last 24 Hrs  T(C): 36.4 (2018 04:54), Max: 36.8 (2018 11:05)  T(F): 97.6 (2018 04:54), Max: 98.2 (2018 11:05)  HR: 81 (2018 04:54) (70 - 91)  BP: 109/72 (2018 04:54) (102/63 - 115/66)  RR: 18 (2018 04:54) (16 - 18)  SpO2: 100% (2018 04:54) (76% - 100%)    Physical Exam  General: in mild respiratory distress on high flow oxygen  HEENT: NCAT, PERRLA, EOMI bl, moist mucous membranes   Neck: Supple, nontender, no mass  Neurology: A&Ox4, nonfocal, CN II-XII grossly intact, sensation intact  Respiratory: Decreased breath sounds in bilateral bases, increase work of breathing, now on High flow oxygen  CV: tachycardic, irregular rhythm. No murmurs  Abdominal: Soft, NT, ND +BSx4  Extremities: No C/C/E, + peripheral pulses  MSK: Normal ROM, no joint erythema or warmth, no joint swelling   Skin: warm, dry, normal color, no rash or abnormal lesions    LABS:                               10.2   9.1   )-----------( 348      ( 2018 07:13 )             31.0     04-    132<L>  |  97  |  42<H>  ----------------------------<  168<H>  5.2   |  26  |  1.50<H>    Ca    8.7      2018 07:13    TPro  6.0  /  Alb  3.0<L>  /  TBili  0.6  /  DBili  x   /  AST  14<L>  /  ALT  19  /  AlkPhos  98  04-23      Radiology  XR CHEST PORTABLE URGENT 1V                            PROCEDURE DATE:  2018          INTERPRETATION:  CHF, follow-up.    AP chest. Prior 2018.    Very low lung volumes. No change heart mediastinum. Bibasilar   interstitial edema small hazy effusions consistent with history of CHF.   Question superimposed airspace infiltrate at the right base. Recommend   clinical correlation close follow-up. Remainder study unchanged.    Impression: As above        EXAM:  CT CHEST                            PROCEDURE DATE:  2018          INTERPRETATION:  CLINICAL INFORMATION:  Shortness of breath    PROCEDURE:  Using multislice helical CT, thin sections were obtained from   the thoracic inlet through the lung bases.    COMPARISON: Chest x-ray of earlier in the day and CT chest of 2018    FINDINGS:      There is no significant axillary, hilar, or mediastinal lymphadenopathy.   There is no pericardial effusion. There are small free-flowing pleural   effusions right greater than left There is atherosclerotic calcification   of the aorta and tracheobronchial calcification. There is coronary artery   calcification.    There is emphysema. There is bibasilar atelectasis. There are patchy   areas of infiltrate in the right upper lobe.    The osseous structures demonstrate osteopenia and degenerative change.    The upper abdomen is remarkable for extensive vascular calcifications   with rounded peripherally calcified lesion again identified in the spleen   there are probable small gallstones in the gallbladder.    IMPRESSION: Small right greater than left pleural effusions  Scattered patchy right upper lobe pulmonary infiltrates new from the   prior study  Emphysema  Stable peripherally calcified lesion in the spleen HPI:  83F w/pmh of COPD not on home 02, HTN, HLD, Afib on Eliquis, CHF, multiple compression fractures and ascending penetrating aortic ulcer presenting with sob for 3-4 days. States that she has been having increasing sob for the past few days. States that she also has nausea and regurgitated clear fluids around 3-4 days ago. Pt has felt increasing weakness and lethargy with decreased appetite as well. She has tried nebulizers and inhalers for her sob without relief of her symptoms. Patient is unable to lie down flat due to compression fractures and difficulty breathing. Denies headache, fever, chills, vomiting, cough, chest pain, palpitations, peripheral edema and abdominal pain. No sick contacts. No recent travel. She ambulates with a cane. Patient has a history of spasmodic dysphonia which she was previously getting botox shots for but had an episode of aspiration so she stopped the injections.    In ED, patient was hypotensive with BP of 88/53, improved to 106/60. Remainder of vitals were within normal range. CBC  unremarkable, BMP significant for hyponatremia. ProBNP was 25,370. RVP negative. CT chest ordered and pending. Preliminary read of EKG revealed afib with ventricular rate of 85. CXR showed small right pleural effusion, questionable infiltrate. CT chest revealed small right greater than left pleural effusions. Scattered patchy right upper lobe pulmonary infiltrates new from the prior study. Emphysema. Stable peripherally calcified lesion in the spleen. Patient given Rocephin and zithromax in ED. (2018 17:33)    	  INTERVAL HPI/OVERNIGHT EVENTS: Patient seen and examined at bedside. This morning, O2 sat decreased to 80s while patient was on high flow oxygen, she was switched to BIPAP, which improved the oxygen. She was later transitioned back to high flow.     MEDICATIONS  (STANDING):  ALBUTerol    0.083% 2.5 milliGRAM(s) Nebulizer every 8 hours  apixaban 2.5 milliGRAM(s) Oral every 12 hours  buDESOnide   0.5 milliGRAM(s) Respule 0.5 milliGRAM(s) Inhalation two times a day  diltiazem    Tablet 60 milliGRAM(s) Oral four times a day  furosemide   Injectable 40 milliGRAM(s) IV Push two times a day  metoprolol tartrate 25 milliGRAM(s) Oral two times a day  mirtazapine 7.5 milliGRAM(s) Oral at bedtime  pantoprazole    Tablet 40 milliGRAM(s) Oral before breakfast  piperacillin/tazobactam IVPB. 3.375 Gram(s) IV Intermittent every 8 hours  predniSONE   Tablet 20 milliGRAM(s) Oral two times a day  tiotropium 2.5 MICROgram(s)/olodaterol 2.5 MICROgram(s) Inhaler 2 Puff(s) Inhalation daily    MEDICATIONS  (PRN):  acetaminophen   Tablet. 650 milliGRAM(s) Oral every 6 hours PRN Mild Pain (1 - 3)  ALBUTerol    90 MICROgram(s) HFA Inhaler 2 Puff(s) Inhalation every 6 hours PRN Bronchospasm  lidocaine   Patch 1 Patch Transdermal every 24 hours PRN right shoulder pain  morphine  - Injectable 0.25 milliGRAM(s) IV Push every 3 hours PRN resp distress, dyspnea, pain,  Allergies    No Known Allergies    Intolerances    PAST MEDICAL & SURGICAL HISTORY:  CHF (congestive heart failure)  Compression fracture: Aug 2015  Carbon monoxide poisonin  Pulmonary emphysema, unspecified emphysema type  Secondary hypertension  Atrial fibrillation, unspecified type  History of arthroscopy of knee  History of appendectomy:   History of right shoulder replacement:     Home Medications:  calcitonin nasal 200 intl units/inh spray: 1 spray(s) nasal in one nostril everyday, alternating each nostril.  (2018 21:25)  Eliquis 2.5 mg oral tablet: 1 tab(s) orally 2 times a day (2018 21:25)  furosemide 20 mg oral tablet: 1 tab(s) orally 2 times a day (2018 21:25)  losartan 50 mg oral tablet: 1 tab(s) orally once a day (2018 16:47)  metoprolol succinate 100 mg oral tablet, extended release: 1 tab(s) orally once a day (2018 21:25)  omeprazole 20 mg oral delayed release capsule: 1 cap(s) orally 2 times a day (2018 21:25)  Praluent Pen 75 mg/mL subcutaneous solution: subcutaneous 2 times a month (2018 21:25)  ProAir HFA 90 mcg/inh inhalation aerosol: Inhale 1 to 2 puffs every 4 to 6 hours as needed (2018 21:25)  Stiolto Respimat 2.5 mcg-2.5 mcg/inh inhalation aerosol: 2 puff(s) inhaled every 24 hours (2018 21:25)    CONSTITUTIONAL: admits generalized weakness, no fevers or chills  EYES/ENT: No visual changes;  No vertigo or throat pain   NECK: admits neck discomfort, denies stiffness  RESPIRATORY: admits to shortness of breath this morning, but denies SOB on examination.  CARDIOVASCULAR: admits to SOB  GASTROINTESTINAL: No abdominal pain. No nausea, vomiting, or hematemesis; No diarrhea or constipation. No melena or hematochezia.  GENITOURINARY: No dysuria, frequency or hematuria  NEUROLOGICAL: No numbness or weakness  MSK: Back pain  SKIN: No itching or rash    Vital Signs Last 24 Hrs  T(C): 36.4 (2018 04:54), Max: 36.8 (2018 11:05)  T(F): 97.6 (2018 04:54), Max: 98.2 (2018 11:05)  HR: 81 (2018 04:54) (70 - 91)  BP: 109/72 (2018 04:54) (102/63 - 115/66)  RR: 18 (2018 04:54) (16 - 18)  SpO2: 100% (2018 04:54) (76% - 100%)    Physical Exam  General: in no respiratory distress on high flow oxygen  HEENT: NCAT, PERRLA, EOMI bl, moist mucous membranes   Neck: Supple, nontender, no mass  Neurology: A&Ox4, nonfocal, CN II-XII grossly intact, sensation intact  Respiratory: Decreased breath sounds in bilateral bases, increase work of breathing, on High flow oxygen  CV: tachycardic, irregular rhythm. No murmurs  Abdominal: Soft, NT, ND +BSx4  Extremities: No C/C/E, + peripheral pulses  MSK: Normal ROM, no joint erythema or warmth, no joint swelling   Skin: warm, dry, normal color, no rash or abnormal lesions    LABS:                               10.2   9.1   )-----------( 348      ( 2018 07:13 )             31.0     04-    132<L>  |  97  |  42<H>  ----------------------------<  168<H>  5.2   |  26  |  1.50<H>    Ca    8.7      2018 07:13    TPro  6.0  /  Alb  3.0<L>  /  TBili  0.6  /  DBili  x   /  AST  14<L>  /  ALT  19  /  AlkPhos  98  04-      Radiology  XR CHEST PORTABLE URGENT 1V                            PROCEDURE DATE:  2018          INTERPRETATION:  CHF, follow-up.    AP chest. Prior 2018.    Very low lung volumes. No change heart mediastinum. Bibasilar   interstitial edema small hazy effusions consistent with history of CHF.   Question superimposed airspace infiltrate at the right base. Recommend   clinical correlation close follow-up. Remainder study unchanged.    Impression: As above        EXAM:  CT CHEST                            PROCEDURE DATE:  2018          INTERPRETATION:  CLINICAL INFORMATION:  Shortness of breath    PROCEDURE:  Using multislice helical CT, thin sections were obtained from   the thoracic inlet through the lung bases.    COMPARISON: Chest x-ray of earlier in the day and CT chest of 2018    FINDINGS:      There is no significant axillary, hilar, or mediastinal lymphadenopathy.   There is no pericardial effusion. There are small free-flowing pleural   effusions right greater than left There is atherosclerotic calcification   of the aorta and tracheobronchial calcification. There is coronary artery   calcification.    There is emphysema. There is bibasilar atelectasis. There are patchy   areas of infiltrate in the right upper lobe.    The osseous structures demonstrate osteopenia and degenerative change.    The upper abdomen is remarkable for extensive vascular calcifications   with rounded peripherally calcified lesion again identified in the spleen   there are probable small gallstones in the gallbladder.    IMPRESSION: Small right greater than left pleural effusions  Scattered patchy right upper lobe pulmonary infiltrates new from the   prior study  Emphysema  Stable peripherally calcified lesion in the spleen

## 2018-04-26 NOTE — PROGRESS NOTE ADULT - PROBLEM SELECTOR PLAN 2
-Plan as above  -Patient will need to be evaluated for home O2 if symptoms don't improve but this is likely due to increased pulm vascular congestion.  -Echo performed similar to prior  -Continue strict I and O

## 2018-04-27 LAB
ANION GAP SERPL CALC-SCNC: 8 MMOL/L — SIGNIFICANT CHANGE UP (ref 5–17)
BUN SERPL-MCNC: 31 MG/DL — HIGH (ref 7–23)
CALCIUM SERPL-MCNC: 8.1 MG/DL — LOW (ref 8.5–10.1)
CHLORIDE SERPL-SCNC: 100 MMOL/L — SIGNIFICANT CHANGE UP (ref 96–108)
CO2 SERPL-SCNC: 34 MMOL/L — HIGH (ref 22–31)
CREAT SERPL-MCNC: 1.2 MG/DL — SIGNIFICANT CHANGE UP (ref 0.5–1.3)
GLUCOSE SERPL-MCNC: 124 MG/DL — HIGH (ref 70–99)
HCT VFR BLD CALC: 32 % — LOW (ref 34.5–45)
HGB BLD-MCNC: 10.4 G/DL — LOW (ref 11.5–15.5)
MCHC RBC-ENTMCNC: 32.5 GM/DL — SIGNIFICANT CHANGE UP (ref 32–36)
MCHC RBC-ENTMCNC: 34.2 PG — HIGH (ref 27–34)
MCV RBC AUTO: 105.4 FL — HIGH (ref 80–100)
PLATELET # BLD AUTO: 295 K/UL — SIGNIFICANT CHANGE UP (ref 150–400)
POTASSIUM SERPL-MCNC: 3.8 MMOL/L — SIGNIFICANT CHANGE UP (ref 3.5–5.3)
POTASSIUM SERPL-SCNC: 3.8 MMOL/L — SIGNIFICANT CHANGE UP (ref 3.5–5.3)
RBC # BLD: 3.04 M/UL — LOW (ref 3.8–5.2)
RBC # FLD: 14.7 % — HIGH (ref 10.3–14.5)
SODIUM SERPL-SCNC: 142 MMOL/L — SIGNIFICANT CHANGE UP (ref 135–145)
WBC # BLD: 14.5 K/UL — HIGH (ref 3.8–10.5)
WBC # FLD AUTO: 14.5 K/UL — HIGH (ref 3.8–10.5)

## 2018-04-27 PROCEDURE — 99233 SBSQ HOSP IP/OBS HIGH 50: CPT | Mod: GC

## 2018-04-27 PROCEDURE — 99232 SBSQ HOSP IP/OBS MODERATE 35: CPT

## 2018-04-27 RX ADMIN — ALBUTEROL 2.5 MILLIGRAM(S): 90 AEROSOL, METERED ORAL at 07:28

## 2018-04-27 RX ADMIN — Medication 25 MILLIGRAM(S): at 06:18

## 2018-04-27 RX ADMIN — Medication 0.5 MILLIGRAM(S): at 19:08

## 2018-04-27 RX ADMIN — Medication 0.5 MILLIGRAM(S): at 07:28

## 2018-04-27 RX ADMIN — APIXABAN 2.5 MILLIGRAM(S): 2.5 TABLET, FILM COATED ORAL at 06:18

## 2018-04-27 RX ADMIN — ALBUTEROL 2.5 MILLIGRAM(S): 90 AEROSOL, METERED ORAL at 13:53

## 2018-04-27 RX ADMIN — APIXABAN 2.5 MILLIGRAM(S): 2.5 TABLET, FILM COATED ORAL at 18:09

## 2018-04-27 RX ADMIN — Medication 25 MILLIGRAM(S): at 18:09

## 2018-04-27 RX ADMIN — PIPERACILLIN AND TAZOBACTAM 25 GRAM(S): 4; .5 INJECTION, POWDER, LYOPHILIZED, FOR SOLUTION INTRAVENOUS at 21:54

## 2018-04-27 RX ADMIN — ALBUTEROL 2.5 MILLIGRAM(S): 90 AEROSOL, METERED ORAL at 19:08

## 2018-04-27 RX ADMIN — TIOTROPIUM BROMIDE AND OLODATEROL 2 PUFF(S): 3.124; 2.736 SPRAY, METERED RESPIRATORY (INHALATION) at 06:18

## 2018-04-27 RX ADMIN — PIPERACILLIN AND TAZOBACTAM 25 GRAM(S): 4; .5 INJECTION, POWDER, LYOPHILIZED, FOR SOLUTION INTRAVENOUS at 14:16

## 2018-04-27 RX ADMIN — Medication 40 MILLIGRAM(S): at 06:18

## 2018-04-27 RX ADMIN — PIPERACILLIN AND TAZOBACTAM 25 GRAM(S): 4; .5 INJECTION, POWDER, LYOPHILIZED, FOR SOLUTION INTRAVENOUS at 06:18

## 2018-04-27 RX ADMIN — Medication 20 MILLIGRAM(S): at 12:33

## 2018-04-27 RX ADMIN — PANTOPRAZOLE SODIUM 40 MILLIGRAM(S): 20 TABLET, DELAYED RELEASE ORAL at 06:18

## 2018-04-27 RX ADMIN — Medication 40 MILLIGRAM(S): at 18:09

## 2018-04-27 NOTE — PROGRESS NOTE ADULT - PROBLEM SELECTOR PLAN 6
-Patient fluctuates between normal cr and DENISE. Unclear if patient has CKD at baseline or has frequent DENISE.  Cr stable at 1.5  Restarted on Lasix  Monitor Cr for improvement -Patient fluctuates between normal cr and DENISE. Unclear if patient has CKD at baseline or has frequent DENISE.  Cr normal at 1.2  Restarted on Lasix  Monitor

## 2018-04-27 NOTE — PROGRESS NOTE ADULT - PROBLEM SELECTOR PLAN 7
Hgb dropped from 12 to 10.5, now   Likely secondary to chronic disease  No evidence of bleed  iron panel showed elevated ferritin, rest of panel normal, normal b12 and folate  Monitor h/h daily

## 2018-04-27 NOTE — PROGRESS NOTE ADULT - PROBLEM SELECTOR PLAN 1
-Patient continues to desaturate likely secondary to fluid overload, chronic aspiration, and COPD, is currently on high flow oxygen and BIPAP at night   -BNP>30,000, increased from prior  -Continue on Lasix 40 IV BID: per cardio recs  -Continue zosyn as aspiration is suspected  -Monitor kidney function while on lasix  -Continue with albuterol, tiotropium, budesonide, duonebs  -Speech and Swallow evaluation recommends dysphagia 3 with thin liquids  -PT recommends subacute rehab

## 2018-04-27 NOTE — PROGRESS NOTE ADULT - ASSESSMENT
83F w/pmh of COPD not on home 02, HTN, HLD, Afib on Eliquis, CHF, multiple compression fractures and ascending penetrating aortic ulcer presenting with sob.    Likely multifactorial 2/2 CHF exacerbation vs. COPD vs. aspiration pneumonia.  No clear evidence of acute ischemia,    - Still on HiFlow.  No events overnight.    - Follow Pulm recs.  Continue bronchodilators and steroids  - She seems to need diuresis. Her exam does not indicate it but her increased need for supplemental O2, elevated BNP, CXR with no improvement in her vascular congestion and the responsiveness of her sodium level after restarting lasix probably indicates volume overload.   - Cont Lasix 40 IV bid.  Please continue to maintain strict I/Os, monitor daily weights, Cr, and K.   - No acute ischemia  - Continue Lopressor 25mg BID and Cardizem 60mg 4x daily. BP soft per flowsheet but has been receiving her doses with rate controlled AF on tele.      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  Repeat tte from yesterday is similar   - Pt has hx of chronic afib on Eliquis.  CHADsVASc score 5.  H& H remains stable Continue Eliquis 2.5 mg BID ( pt. has normal renal function, she met criteria for 5 bid, however. Continue to monitor CBC)  - Monitor and replete lytes, keep K>4, Mg>2  - Cardiovascular workup will depend on clinical course, all other workup per primary team  - Will continue to follow-up   - Patient is a DNR. Overall prognosis is poor

## 2018-04-27 NOTE — PROGRESS NOTE ADULT - SUBJECTIVE AND OBJECTIVE BOX
HPI:  83F w/pmh of COPD not on home 02, HTN, HLD, Afib on Eliquis, CHF, multiple compression fractures and ascending penetrating aortic ulcer presenting with sob for 3-4 days. States that she has been having increasing sob for the past few days. States that she also has nausea and regurgitated clear fluids around 3-4 days ago. Pt has felt increasing weakness and lethargy with decreased appetite as well. She has tried nebulizers and inhalers for her sob without relief of her symptoms. Patient is unable to lie down flat due to compression fractures and difficulty breathing. Denies headache, fever, chills, vomiting, cough, chest pain, palpitations, peripheral edema and abdominal pain. No sick contacts. No recent travel. She ambulates with a cane. Patient has a history of spasmodic dysphonia which she was previously getting botox shots for but had an episode of aspiration so she stopped the injections.    In ED, patient was hypotensive with BP of 88/53, improved to 106/60. Remainder of vitals were within normal range. CBC  unremarkable, BMP significant for hyponatremia. ProBNP was 25,370. RVP negative. CT chest ordered and pending. Preliminary read of EKG revealed afib with ventricular rate of 85. CXR showed small right pleural effusion, questionable infiltrate. CT chest revealed small right greater than left pleural effusions. Scattered patchy right upper lobe pulmonary infiltrates new from the prior study. Emphysema. Stable peripherally calcified lesion in the spleen. Patient given Rocephin and zithromax in ED. (2018 17:33)    	  INTERVAL HPI/OVERNIGHT EVENTS: Patient seen and examined at bedside.     MEDICATIONS  (STANDING):  ALBUTerol    0.083% 2.5 milliGRAM(s) Nebulizer every 8 hours  apixaban 2.5 milliGRAM(s) Oral every 12 hours  buDESOnide   0.5 milliGRAM(s) Respule 0.5 milliGRAM(s) Inhalation two times a day  diltiazem    Tablet 60 milliGRAM(s) Oral four times a day  furosemide   Injectable 40 milliGRAM(s) IV Push two times a day  methylPREDNISolone sodium succinate Injectable 20 milliGRAM(s) IV Push daily  metoprolol tartrate 25 milliGRAM(s) Oral two times a day  mirtazapine 7.5 milliGRAM(s) Oral at bedtime  pantoprazole    Tablet 40 milliGRAM(s) Oral before breakfast  piperacillin/tazobactam IVPB. 3.375 Gram(s) IV Intermittent every 8 hours  tiotropium 2.5 MICROgram(s)/olodaterol 2.5 MICROgram(s) Inhaler 2 Puff(s) Inhalation daily    MEDICATIONS  (PRN):  acetaminophen   Tablet. 650 milliGRAM(s) Oral every 6 hours PRN Mild Pain (1 - 3)  lidocaine   Patch 1 Patch Transdermal every 24 hours PRN right shoulder pain  morphine  - Injectable 0.25 milliGRAM(s) IV Push every 3 hours PRN resp distress, dyspnea, pain,    No Known Allergies    Intolerances    PAST MEDICAL & SURGICAL HISTORY:  CHF (congestive heart failure)  Compression fracture: Aug 2015  Carbon monoxide poisonin  Pulmonary emphysema, unspecified emphysema type  Secondary hypertension  Atrial fibrillation, unspecified type  History of arthroscopy of knee  History of appendectomy:   History of right shoulder replacement:     Home Medications:  calcitonin nasal 200 intl units/inh spray: 1 spray(s) nasal in one nostril everyday, alternating each nostril.  (2018 21:25)  Eliquis 2.5 mg oral tablet: 1 tab(s) orally 2 times a day (2018 21:25)  furosemide 20 mg oral tablet: 1 tab(s) orally 2 times a day (2018 21:25)  losartan 50 mg oral tablet: 1 tab(s) orally once a day (2018 16:47)  metoprolol succinate 100 mg oral tablet, extended release: 1 tab(s) orally once a day (2018 21:25)  omeprazole 20 mg oral delayed release capsule: 1 cap(s) orally 2 times a day (2018 21:25)  Praluent Pen 75 mg/mL subcutaneous solution: subcutaneous 2 times a month (2018 21:25)  ProAir HFA 90 mcg/inh inhalation aerosol: Inhale 1 to 2 puffs every 4 to 6 hours as needed (2018 21:25)  Stiolto Respimat 2.5 mcg-2.5 mcg/inh inhalation aerosol: 2 puff(s) inhaled every 24 hours (2018 21:25)    CONSTITUTIONAL: admits generalized weakness, no fevers or chills  EYES/ENT: No visual changes;  No vertigo or throat pain   NECK: admits neck discomfort, denies stiffness  RESPIRATORY: admits to shortness of breath this morning, but denies SOB on examination.  CARDIOVASCULAR: admits to SOB  GASTROINTESTINAL: No abdominal pain. No nausea, vomiting, or hematemesis; No diarrhea or constipation. No melena or hematochezia.  GENITOURINARY: No dysuria, frequency or hematuria  NEUROLOGICAL: No numbness or weakness  MSK: Back pain  SKIN: No itching or rash    Vital Signs Last 24 Hrs  T(C): 36.5 (2018 05:06), Max: 37 (2018 11:55)  T(F): 97.7 (2018 05:06), Max: 98.6 (2018 11:55)  HR: 87 (2018 05:06) (78 - 95)  BP: 112/75 (2018 05:06) (104/65 - 112/75)  RR: 18 (2018 05:06) (16 - 18)  SpO2: 100% (2018 05:06) (95% - 100%)    Physical Exam  General: in no respiratory distress on high flow oxygen  HEENT: NCAT, PERRLA, EOMI bl, moist mucous membranes   Neck: Supple, nontender, no mass  Neurology: A&Ox4, nonfocal, CN II-XII grossly intact, sensation intact  Respiratory: Decreased breath sounds in bilateral bases, increase work of breathing, on High flow oxygen  CV: tachycardic, irregular rhythm. No murmurs  Abdominal: Soft, NT, ND +BSx4  Extremities: No C/C/E, + peripheral pulses  MSK: Normal ROM, no joint erythema or warmth, no joint swelling   Skin: warm, dry, normal color, no rash or abnormal lesions    LABS:                          Radiology  XR CHEST PORTABLE URGENT 1V                            PROCEDURE DATE:  2018          INTERPRETATION:  CHF, follow-up.    AP chest. Prior 2018.    Very low lung volumes. No change heart mediastinum. Bibasilar   interstitial edema small hazy effusions consistent with history of CHF.   Question superimposed airspace infiltrate at the right base. Recommend   clinical correlation close follow-up. Remainder study unchanged.    Impression: As above        EXAM:  CT CHEST                            PROCEDURE DATE:  2018          INTERPRETATION:  CLINICAL INFORMATION:  Shortness of breath    PROCEDURE:  Using multislice helical CT, thin sections were obtained from   the thoracic inlet through the lung bases.    COMPARISON: Chest x-ray of earlier in the day and CT chest of 2018    FINDINGS:      There is no significant axillary, hilar, or mediastinal lymphadenopathy.   There is no pericardial effusion. There are small free-flowing pleural   effusions right greater than left There is atherosclerotic calcification   of the aorta and tracheobronchial calcification. There is coronary artery   calcification.    There is emphysema. There is bibasilar atelectasis. There are patchy   areas of infiltrate in the right upper lobe.    The osseous structures demonstrate osteopenia and degenerative change.    The upper abdomen is remarkable for extensive vascular calcifications   with rounded peripherally calcified lesion again identified in the spleen   there are probable small gallstones in the gallbladder.    IMPRESSION: Small right greater than left pleural effusions  Scattered patchy right upper lobe pulmonary infiltrates new from the   prior study  Emphysema  Stable peripherally calcified lesion in the spleen HPI:  83F w/pmh of COPD not on home 02, HTN, HLD, Afib on Eliquis, CHF, multiple compression fractures and ascending penetrating aortic ulcer presenting with sob for 3-4 days. States that she has been having increasing sob for the past few days. States that she also has nausea and regurgitated clear fluids around 3-4 days ago. Pt has felt increasing weakness and lethargy with decreased appetite as well. She has tried nebulizers and inhalers for her sob without relief of her symptoms. Patient is unable to lie down flat due to compression fractures and difficulty breathing. Denies headache, fever, chills, vomiting, cough, chest pain, palpitations, peripheral edema and abdominal pain. No sick contacts. No recent travel. She ambulates with a cane. Patient has a history of spasmodic dysphonia which she was previously getting botox shots for but had an episode of aspiration so she stopped the injections.    In ED, patient was hypotensive with BP of 88/53, improved to 106/60. Remainder of vitals were within normal range. CBC  unremarkable, BMP significant for hyponatremia. ProBNP was 25,370. RVP negative. CT chest ordered and pending. Preliminary read of EKG revealed afib with ventricular rate of 85. CXR showed small right pleural effusion, questionable infiltrate. CT chest revealed small right greater than left pleural effusions. Scattered patchy right upper lobe pulmonary infiltrates new from the prior study. Emphysema. Stable peripherally calcified lesion in the spleen. Patient given Rocephin and zithromax in ED. (2018 17:33)    	  INTERVAL HPI/OVERNIGHT EVENTS: Patient seen and examined at bedside. Patient was not hypoxic overnight, remained on high flow throughout the night.     MEDICATIONS  (STANDING):  ALBUTerol    0.083% 2.5 milliGRAM(s) Nebulizer every 8 hours  apixaban 2.5 milliGRAM(s) Oral every 12 hours  buDESOnide   0.5 milliGRAM(s) Respule 0.5 milliGRAM(s) Inhalation two times a day  diltiazem    Tablet 60 milliGRAM(s) Oral four times a day  furosemide   Injectable 40 milliGRAM(s) IV Push two times a day  methylPREDNISolone sodium succinate Injectable 20 milliGRAM(s) IV Push daily  metoprolol tartrate 25 milliGRAM(s) Oral two times a day  mirtazapine 7.5 milliGRAM(s) Oral at bedtime  pantoprazole    Tablet 40 milliGRAM(s) Oral before breakfast  piperacillin/tazobactam IVPB. 3.375 Gram(s) IV Intermittent every 8 hours  tiotropium 2.5 MICROgram(s)/olodaterol 2.5 MICROgram(s) Inhaler 2 Puff(s) Inhalation daily    MEDICATIONS  (PRN):  acetaminophen   Tablet. 650 milliGRAM(s) Oral every 6 hours PRN Mild Pain (1 - 3)  lidocaine   Patch 1 Patch Transdermal every 24 hours PRN right shoulder pain  morphine  - Injectable 0.25 milliGRAM(s) IV Push every 3 hours PRN resp distress, dyspnea, pain,    No Known Allergies    Intolerances    PAST MEDICAL & SURGICAL HISTORY:  CHF (congestive heart failure)  Compression fracture: Aug 2015  Carbon monoxide poisonin  Pulmonary emphysema, unspecified emphysema type  Secondary hypertension  Atrial fibrillation, unspecified type  History of arthroscopy of knee  History of appendectomy:   History of right shoulder replacement:     Home Medications:  calcitonin nasal 200 intl units/inh spray: 1 spray(s) nasal in one nostril everyday, alternating each nostril.  (2018 21:25)  Eliquis 2.5 mg oral tablet: 1 tab(s) orally 2 times a day (2018 21:25)  furosemide 20 mg oral tablet: 1 tab(s) orally 2 times a day (2018 21:25)  losartan 50 mg oral tablet: 1 tab(s) orally once a day (2018 16:47)  metoprolol succinate 100 mg oral tablet, extended release: 1 tab(s) orally once a day (2018 21:25)  omeprazole 20 mg oral delayed release capsule: 1 cap(s) orally 2 times a day (2018 21:25)  Praluent Pen 75 mg/mL subcutaneous solution: subcutaneous 2 times a month (2018 21:25)  ProAir HFA 90 mcg/inh inhalation aerosol: Inhale 1 to 2 puffs every 4 to 6 hours as needed (2018 21:25)  Stiolto Respimat 2.5 mcg-2.5 mcg/inh inhalation aerosol: 2 puff(s) inhaled every 24 hours (2018 21:25)    CONSTITUTIONAL: admits generalized weakness, no fevers or chills  EYES/ENT: No visual changes;  No vertigo or throat pain   NECK: admits neck discomfort, denies stiffness  RESPIRATORY: admits to shortness of breath this morning, but denies SOB on examination.  CARDIOVASCULAR: admits to SOB  GASTROINTESTINAL: No abdominal pain. No nausea, vomiting, or hematemesis; No diarrhea or constipation. No melena or hematochezia.  GENITOURINARY: No dysuria, frequency or hematuria  NEUROLOGICAL: No numbness or weakness  MSK: Back pain  SKIN: No itching or rash    Vital Signs Last 24 Hrs  T(C): 36.5 (2018 05:06), Max: 37 (2018 11:55)  T(F): 97.7 (2018 05:06), Max: 98.6 (2018 11:55)  HR: 87 (2018 05:06) (78 - 95)  BP: 112/75 (2018 05:06) (104/65 - 112/75)  RR: 18 (2018 05:06) (16 - 18)  SpO2: 100% (2018 05:06) (95% - 100%)    Physical Exam  General: in no respiratory distress on high flow oxygen  HEENT: NCAT, PERRLA, EOMI bl, moist mucous membranes   Neck: Supple, nontender, no mass  Neurology: A&Ox4, nonfocal, CN II-XII grossly intact, sensation intact  Respiratory: Decreased breath sounds in bilateral bases, increase work of breathing, on High flow oxygen  CV: tachycardic, irregular rhythm. No murmurs  Abdominal: Soft, NT, ND +BSx4  Extremities: No C/C/E, + peripheral pulses  MSK: Normal ROM, no joint erythema or warmth, no joint swelling   Skin: warm, dry, normal color, no rash or abnormal lesions    LABS:                        10.4   14.5  )-----------( 295      ( 2018 06:09 )             32.0     -    142  |  100  |  31<H>  ----------------------------<  124<H>  3.8   |  34<H>  |  1.20    Ca    8.1<L>      2018 06:09             Radiology  XR CHEST PORTABLE URGENT 1V                            PROCEDURE DATE:  2018          INTERPRETATION:  CHF, follow-up.    AP chest. Prior 2018.    Very low lung volumes. No change heart mediastinum. Bibasilar   interstitial edema small hazy effusions consistent with history of CHF.   Question superimposed airspace infiltrate at the right base. Recommend   clinical correlation close follow-up. Remainder study unchanged.    Impression: As above        EXAM:  CT CHEST                            PROCEDURE DATE:  2018          INTERPRETATION:  CLINICAL INFORMATION:  Shortness of breath    PROCEDURE:  Using multislice helical CT, thin sections were obtained from   the thoracic inlet through the lung bases.    COMPARISON: Chest x-ray of earlier in the day and CT chest of 2018    FINDINGS:      There is no significant axillary, hilar, or mediastinal lymphadenopathy.   There is no pericardial effusion. There are small free-flowing pleural   effusions right greater than left There is atherosclerotic calcification   of the aorta and tracheobronchial calcification. There is coronary artery   calcification.    There is emphysema. There is bibasilar atelectasis. There are patchy   areas of infiltrate in the right upper lobe.    The osseous structures demonstrate osteopenia and degenerative change.    The upper abdomen is remarkable for extensive vascular calcifications   with rounded peripherally calcified lesion again identified in the spleen   there are probable small gallstones in the gallbladder.    IMPRESSION: Small right greater than left pleural effusions  Scattered patchy right upper lobe pulmonary infiltrates new from the   prior study  Emphysema  Stable peripherally calcified lesion in the spleen

## 2018-04-27 NOTE — PROGRESS NOTE ADULT - SUBJECTIVE AND OBJECTIVE BOX
Date/Time Patient Seen:  		  Referring MD:   Data Reviewed	       Patient is a 83y old  Female who presents with a chief complaint of shortness of breath (2018 14:05)  in bed  seen and examined  vs and meds reviewed    on High Flow NC  on Steroids  on LASIX  on NEBS      Subjective/HPI     PAST MEDICAL & SURGICAL HISTORY:  CHF (congestive heart failure)  Compression fracture: Aug 2015  Carbon monoxide poisonin  Pulmonary emphysema, unspecified emphysema type  Secondary hypertension  Atrial fibrillation, unspecified type  History of arthroscopy of knee  History of appendectomy:   History of right shoulder replacement:   S/P appy        Medication list         MEDICATIONS  (STANDING):  ALBUTerol    0.083% 2.5 milliGRAM(s) Nebulizer every 8 hours  apixaban 2.5 milliGRAM(s) Oral every 12 hours  buDESOnide   0.5 milliGRAM(s) Respule 0.5 milliGRAM(s) Inhalation two times a day  diltiazem    Tablet 60 milliGRAM(s) Oral four times a day  furosemide   Injectable 40 milliGRAM(s) IV Push two times a day  methylPREDNISolone sodium succinate Injectable 20 milliGRAM(s) IV Push daily  metoprolol tartrate 25 milliGRAM(s) Oral two times a day  mirtazapine 7.5 milliGRAM(s) Oral at bedtime  pantoprazole    Tablet 40 milliGRAM(s) Oral before breakfast  piperacillin/tazobactam IVPB. 3.375 Gram(s) IV Intermittent every 8 hours  tiotropium 2.5 MICROgram(s)/olodaterol 2.5 MICROgram(s) Inhaler 2 Puff(s) Inhalation daily    MEDICATIONS  (PRN):  acetaminophen   Tablet. 650 milliGRAM(s) Oral every 6 hours PRN Mild Pain (1 - 3)  lidocaine   Patch 1 Patch Transdermal every 24 hours PRN right shoulder pain  morphine  - Injectable 0.25 milliGRAM(s) IV Push every 3 hours PRN resp distress, dyspnea, pain,         Vitals log        ICU Vital Signs Last 24 Hrs  T(C): 36.5 (2018 05:06), Max: 37 (2018 11:55)  T(F): 97.7 (2018 05:06), Max: 98.6 (2018 11:55)  HR: 87 (2018 05:06) (78 - 95)  BP: 112/75 (2018 05:06) (104/65 - 112/75)  BP(mean): --  ABP: --  ABP(mean): --  RR: 18 (2018 05:06) (16 - 18)  SpO2: 100% (2018 05:06) (95% - 100%)           Input and Output:  I&O's Detail    2018 07:  -  2018 06:07  --------------------------------------------------------  IN:    Solution: 100 mL  Total IN: 100 mL    OUT:    Voided: 550 mL  Total OUT: 550 mL    Total NET: -450 mL          Lab Data                        10.5   12.8  )-----------( 315      ( 2018 07:09 )             31.4     04-26    137  |  97  |  40<H>  ----------------------------<  144<H>  3.8   |  30  |  1.50<H>    Ca    8.3<L>      2018 07:09      ABG - ( 2018 15:52 )  pH, Arterial: 7.45  pH, Blood: x     /  pCO2: 39    /  pO2: 53    / HCO3: 27    / Base Excess: 3.4   /  SaO2: 91                      Review of Systems	      Objective     Physical Examination  frail  weak  lung dec BS  abd soft        Pertinent Lab findings & Imaging      Elpidio:  NO   Adequate UO     I&O's Detail    2018 07:  -  2018 06:07  --------------------------------------------------------  IN:    Solution: 100 mL  Total IN: 100 mL    OUT:    Voided: 550 mL  Total OUT: 550 mL    Total NET: -450 mL               Discussed with:     Cultures:	        Radiology

## 2018-04-27 NOTE — PROGRESS NOTE ADULT - PROBLEM SELECTOR PLAN 1
resp distress  frail and weak  multifactorial  copd and chf  cont LASIX, I and O, replete lytes, cvs regimen and BP control  cont NEBS and Systemic Steroids, will taper and cont in IV form  on low dose Morphine for pain and distress  on High Flow NC - keep sat > 88 pct, off BIPAP at present and is tolerating current o2 delivery mode  prognosis poor  pt is on emp ABX for possible lower resp tract infection  pt is DNR DNI  discussed with Daughter at the BS  will follow

## 2018-04-27 NOTE — PROGRESS NOTE ADULT - PROBLEM SELECTOR PLAN 5
ABG consistent hypoxia  Assess need for home O2  Continue tiotroprium, albuterol, budesonide  Continue on prednisone 20 BID

## 2018-04-27 NOTE — PROGRESS NOTE ADULT - PROBLEM SELECTOR PLAN 4
-Suspected aspiration pneumonia, ABG consistent with hypoxia, mild leukocytosis  -CT revealed small right greater than left pleural effusions. Scattered patchy right upper lobe pulmonary infiltrates new from the prior study.  -Patient has been desaturating  -Procalcitonin elavated  -Continue on zosyn

## 2018-04-27 NOTE — PROGRESS NOTE ADULT - SUBJECTIVE AND OBJECTIVE BOX
[facility] -- Daniella Kenyon, Nola, Ghanshyam, Jhonatan Quintero Savella  Office # [phone]      Follow Up:  AF, HF, MR, resp failure    Subjective/Observations: Patient seen and examined. Events noted. Resting  in bed. He is on high flow. Her dyspnea is mildly improved today. No complaints of chest pain,   or palpitations reported. No signs of orthopnea or PND.       REVIEW OF SYSTEMS: All other review of systems is negative unless indicated above    PAST MEDICAL & SURGICAL HISTORY:  CHF (congestive heart failure)  Compression fracture: Aug 2015  Carbon monoxide poisonin  Pulmonary emphysema, unspecified emphysema type  Secondary hypertension  Atrial fibrillation, unspecified type  History of arthroscopy of knee  History of appendectomy:   History of right shoulder replacement:       MEDICATIONS  (STANDING):  ALBUTerol    0.083% 2.5 milliGRAM(s) Nebulizer every 8 hours  apixaban 2.5 milliGRAM(s) Oral every 12 hours  buDESOnide   0.5 milliGRAM(s) Respule 0.5 milliGRAM(s) Inhalation two times a day  diltiazem    Tablet 60 milliGRAM(s) Oral four times a day  furosemide   Injectable 40 milliGRAM(s) IV Push two times a day  methylPREDNISolone sodium succinate Injectable 20 milliGRAM(s) IV Push daily  metoprolol tartrate 25 milliGRAM(s) Oral two times a day  pantoprazole    Tablet 40 milliGRAM(s) Oral before breakfast  piperacillin/tazobactam IVPB. 3.375 Gram(s) IV Intermittent every 8 hours  tiotropium 2.5 MICROgram(s)/olodaterol 2.5 MICROgram(s) Inhaler 2 Puff(s) Inhalation daily    MEDICATIONS  (PRN):  acetaminophen   Tablet. 650 milliGRAM(s) Oral every 6 hours PRN Mild Pain (1 - 3)  lidocaine   Patch 1 Patch Transdermal every 24 hours PRN right shoulder pain  morphine  - Injectable 0.25 milliGRAM(s) IV Push every 3 hours PRN resp distress, dyspnea, pain,      Allergies    No Known Allergies    Intolerances            Vital Signs Last 24 Hrs  T(C): 36.4 (2018 11:54), Max: 36.6 (2018 20:08)  T(F): 97.6 (2018 11:54), Max: 97.9 (2018 20:08)  HR: 85 (2018 13:56) (64 - 95)  BP: 99/64 (2018 11:54) (95/59 - 112/75)  BP(mean): --  RR: 17 (2018 11:54) (17 - 18)  SpO2: 99% (2018 13:56) (96% - 100%)    I&O's Summary    2018 07:  -  2018 07:00  --------------------------------------------------------  IN: 150 mL / OUT: 550 mL / NET: -400 mL    2018 07:01  -  2018 15:14  --------------------------------------------------------  IN: 300 mL / OUT: 300 mL / NET: 0 mL          PHYSICAL EXAM:  TELE: AF rate controlled.   Constitutional: NAD, awake and alert   HEENT: Moist Mucous Membranes, Anicteric, high flow  Pulmonary: Decreased breath sounds b/l. No rales, crackles or wheeze appreciated. poor air entry  Cardiovascular: IRRR, S1 and S2, 2/6SM  Gastrointestinal: Bowel Sounds present, soft, nontender.   Lymph: No peripheral edema. No lymphadenopathy.  Skin: No visible rashes or ulcers.  Psych:  Mood & affect appropriate    LABS: All Labs Reviewed:                        10.4   14.5  )-----------( 295      ( 2018 06:09 )             32.0                         10.5   12.8  )-----------( 315      ( 2018 07:09 )             31.4                         10.2   9.1   )-----------( 348      ( 2018 07:13 )             31.0     2018 06:09    142    |  100    |  31     ----------------------------<  124    3.8     |  34     |  1.20   2018 07:09    137    |  97     |  40     ----------------------------<  144    3.8     |  30     |  1.50   2018 07:13    132    |  97     |  42     ----------------------------<  168    5.2     |  26     |  1.50     Ca    8.1        2018 06:09  Ca    8.3        2018 07:09  Ca    8.7        2018 07:13

## 2018-04-27 NOTE — PROGRESS NOTE ADULT - PROBLEM SELECTOR PLAN 3
No RVR reported overnight on tele.   Holding parameters were decreased for cardizem and metoprolol as patient was not receiving medications because BP was low. If BP continues to be low, will consider switching to digoxin, continue cardizem 60 QID and metoprolol for now  -[id number] score of 5, continue anticoagulation with Eliquis 2.5 BID, she meets criteria for 5 bid, though continue 2.5 for now given her drop in Hb). Watch Hb

## 2018-04-28 LAB
ANION GAP SERPL CALC-SCNC: 9 MMOL/L — SIGNIFICANT CHANGE UP (ref 5–17)
BUN SERPL-MCNC: 30 MG/DL — HIGH (ref 7–23)
CALCIUM SERPL-MCNC: 7.6 MG/DL — LOW (ref 8.5–10.1)
CHLORIDE SERPL-SCNC: 99 MMOL/L — SIGNIFICANT CHANGE UP (ref 96–108)
CO2 SERPL-SCNC: 35 MMOL/L — HIGH (ref 22–31)
CREAT SERPL-MCNC: 1.2 MG/DL — SIGNIFICANT CHANGE UP (ref 0.5–1.3)
GLUCOSE SERPL-MCNC: 124 MG/DL — HIGH (ref 70–99)
HCT VFR BLD CALC: 30.5 % — LOW (ref 34.5–45)
HGB BLD-MCNC: 11 G/DL — LOW (ref 11.5–15.5)
MCHC RBC-ENTMCNC: 36 GM/DL — SIGNIFICANT CHANGE UP (ref 32–36)
MCHC RBC-ENTMCNC: 37.8 PG — HIGH (ref 27–34)
MCV RBC AUTO: 104.9 FL — HIGH (ref 80–100)
PLATELET # BLD AUTO: 290 K/UL — SIGNIFICANT CHANGE UP (ref 150–400)
POTASSIUM SERPL-MCNC: 3.5 MMOL/L — SIGNIFICANT CHANGE UP (ref 3.5–5.3)
POTASSIUM SERPL-SCNC: 3.5 MMOL/L — SIGNIFICANT CHANGE UP (ref 3.5–5.3)
RBC # BLD: 2.9 M/UL — LOW (ref 3.8–5.2)
RBC # FLD: 14.7 % — HIGH (ref 10.3–14.5)
SODIUM SERPL-SCNC: 143 MMOL/L — SIGNIFICANT CHANGE UP (ref 135–145)
WBC # BLD: 14.9 K/UL — HIGH (ref 3.8–10.5)
WBC # FLD AUTO: 14.9 K/UL — HIGH (ref 3.8–10.5)

## 2018-04-28 PROCEDURE — 99232 SBSQ HOSP IP/OBS MODERATE 35: CPT

## 2018-04-28 PROCEDURE — 99233 SBSQ HOSP IP/OBS HIGH 50: CPT

## 2018-04-28 RX ORDER — PANTOPRAZOLE SODIUM 20 MG/1
40 TABLET, DELAYED RELEASE ORAL
Qty: 0 | Refills: 0 | Status: DISCONTINUED | OUTPATIENT
Start: 2018-04-28 | End: 2018-05-01

## 2018-04-28 RX ADMIN — Medication 40 MILLIGRAM(S): at 05:51

## 2018-04-28 RX ADMIN — APIXABAN 2.5 MILLIGRAM(S): 2.5 TABLET, FILM COATED ORAL at 17:24

## 2018-04-28 RX ADMIN — Medication 40 MILLIGRAM(S): at 17:24

## 2018-04-28 RX ADMIN — PANTOPRAZOLE SODIUM 40 MILLIGRAM(S): 20 TABLET, DELAYED RELEASE ORAL at 06:54

## 2018-04-28 RX ADMIN — APIXABAN 2.5 MILLIGRAM(S): 2.5 TABLET, FILM COATED ORAL at 06:46

## 2018-04-28 RX ADMIN — PIPERACILLIN AND TAZOBACTAM 25 GRAM(S): 4; .5 INJECTION, POWDER, LYOPHILIZED, FOR SOLUTION INTRAVENOUS at 06:02

## 2018-04-28 RX ADMIN — Medication 0.5 MILLIGRAM(S): at 19:48

## 2018-04-28 RX ADMIN — Medication 20 MILLIGRAM(S): at 05:49

## 2018-04-28 RX ADMIN — Medication 0.5 MILLIGRAM(S): at 07:54

## 2018-04-28 RX ADMIN — PIPERACILLIN AND TAZOBACTAM 25 GRAM(S): 4; .5 INJECTION, POWDER, LYOPHILIZED, FOR SOLUTION INTRAVENOUS at 14:09

## 2018-04-28 RX ADMIN — Medication 25 MILLIGRAM(S): at 17:27

## 2018-04-28 RX ADMIN — ALBUTEROL 2.5 MILLIGRAM(S): 90 AEROSOL, METERED ORAL at 19:48

## 2018-04-28 RX ADMIN — ALBUTEROL 2.5 MILLIGRAM(S): 90 AEROSOL, METERED ORAL at 16:23

## 2018-04-28 RX ADMIN — Medication 650 MILLIGRAM(S): at 23:46

## 2018-04-28 RX ADMIN — PIPERACILLIN AND TAZOBACTAM 25 GRAM(S): 4; .5 INJECTION, POWDER, LYOPHILIZED, FOR SOLUTION INTRAVENOUS at 21:44

## 2018-04-28 RX ADMIN — ALBUTEROL 2.5 MILLIGRAM(S): 90 AEROSOL, METERED ORAL at 07:54

## 2018-04-28 RX ADMIN — Medication 25 MILLIGRAM(S): at 06:46

## 2018-04-28 RX ADMIN — TIOTROPIUM BROMIDE AND OLODATEROL 2 PUFF(S): 3.124; 2.736 SPRAY, METERED RESPIRATORY (INHALATION) at 07:23

## 2018-04-28 NOTE — PROGRESS NOTE ADULT - PROBLEM SELECTOR PLAN 1
multifactorial etiology for dyspnea and resp distress  on lasix  on steroids  on nebs  on abx  on High Flow NC  keep sat > 88 pct  oral and skin care  asp prec  HOB elev  assist with ADL  pt is DNR DNI  prognosis poor  spoke with family yesterday about goals of care and going forward...  they want to see how the patient fares through the weekend and then reassess all options, including and not limited to comfort measures and poss inpatient hospice referral  will follow

## 2018-04-28 NOTE — PROGRESS NOTE ADULT - PROBLEM SELECTOR PLAN 7
stable h/h  Likely secondary to chronic disease  No evidence of bleed  iron panel showed elevated ferritin, rest of panel normal, normal b12 and folate  Monitor h/h daily

## 2018-04-28 NOTE — PROGRESS NOTE ADULT - PROBLEM SELECTOR PLAN 8
Patient has history of urinary retention for past 2 weeks  She was here on April 9th, CT stone hunt revealed bilateral non obstructing nephrolithiasis, UA was negative. She's had no urinary symptoms  Patient now urinating without issues  If continues, consider a urology consult

## 2018-04-28 NOTE — PROGRESS NOTE ADULT - PROBLEM SELECTOR PLAN 2
-ECHO 3/2016 showed moderate ischemic myopathy, moderate-severe MR, severe dilated left atrium, normal LV, EF 67%.  Repeat tte from yesterday is similar   -Patient will need to be evaluated for home O2 if symptoms don't improve but this is likely due to increased pulm vascular congestion.  -Continue strict I and O

## 2018-04-28 NOTE — PROGRESS NOTE ADULT - SUBJECTIVE AND OBJECTIVE BOX
83F w/pmh of COPD not on home 02, HTN, HLD, Afib on Eliquis, CHF, multiple compression fractures and ascending penetrating aortic ulcer presenting with sob for 3-4 days, admitted for CHF exacerbation with underyling copd. rAPID Rapid response called because patient desat. to 60s and was in afib with RVR.    INTERVAL HPI: Pt seen and examined bedside, with daughter. has no complaints. Patient was not hypoxic overnight, remained on high flow throughout the night.   OVERNIGHT EVENTS:  T(F): 97.7 (04-28-18 @ 11:45), Max: 98 (04-27-18 @ 23:30)  HR: 76 (04-28-18 @ 11:45) (76 - 94)  BP: 113/68 (04-28-18 @ 11:45) (98/70 - 113/68)  RR: 16 (04-28-18 @ 11:45) (16 - 18)  SpO2: 92% (04-28-18 @ 11:45) (92% - 100%)  Wt(kg): --  I&O's Summary    27 Apr 2018 07:01  -  28 Apr 2018 07:00  --------------------------------------------------------  IN: 300 mL / OUT: 300 mL / NET: 0 mL        REVIEW OF SYSTEM:    Constitutional: No fever, chills, admits generalized weakness, no fevers or chills  Neuro: No headache, numbness, weakness  Resp: No cough, wheezing, shortness of breath  CVS: No chest pain, palpitations, leg swelling  GI: No abdominal pain, nausea, vomiting, diarrhea   : No dysuria, frequency, incontinence  Skin: No itching, burning, rashes, or lesions   Msk: No joint pain or swelling  Psych: No depression, anxiety, mood swings          PHYSICAL EXAM:  GENERAL: NAD, well-developed  HEAD:  Atraumatic, Normocephalic  EYES: EOMI, PERRLA, conjunctiva and sclera clear  ENMT: No tonsillar erythema, exudates, or enlargement; Moist mucous membranes,   NECK: Supple, No JVD, Normal thyroid  HEART: Regular rate and rhythm; No murmurs, rubs, or gallops  RESPIRATORY: Decreased breath sounds in bilateral bases, increase work of breathing, on High flow oxygen  ABDOMEN: Soft, Nontender, Nondistended; Bowel sounds present  NEUROLOGY: A&Ox3, nonfocal, moving all extremities.  EXTREMITIES:  2+ Peripheral Pulses, No clubbing, cyanosis, or edema  SKIN: warm, dry, normal color, no rash or abnormal lesions        LABS:                        11.0   14.9  )-----------( 290      ( 28 Apr 2018 04:36 )             30.5     04-28    143  |  99  |  30<H>  ----------------------------<  124<H>  3.5   |  35<H>  |  1.20    Ca    7.6<L>      28 Apr 2018 04:36          CAPILLARY BLOOD GLUCOSE                  MEDICATIONS  (STANDING):  ALBUTerol    0.083% 2.5 milliGRAM(s) Nebulizer every 8 hours  apixaban 2.5 milliGRAM(s) Oral every 12 hours  buDESOnide   0.5 milliGRAM(s) Respule 0.5 milliGRAM(s) Inhalation two times a day  diltiazem    Tablet 60 milliGRAM(s) Oral four times a day  furosemide   Injectable 40 milliGRAM(s) IV Push two times a day  methylPREDNISolone sodium succinate Injectable 20 milliGRAM(s) IV Push daily  metoprolol tartrate 25 milliGRAM(s) Oral two times a day  pantoprazole   Suspension 40 milliGRAM(s) Oral before breakfast  piperacillin/tazobactam IVPB. 3.375 Gram(s) IV Intermittent every 8 hours  tiotropium 2.5 MICROgram(s)/olodaterol 2.5 MICROgram(s) Inhaler 2 Puff(s) Inhalation daily    MEDICATIONS  (PRN):  acetaminophen   Tablet. 650 milliGRAM(s) Oral every 6 hours PRN Mild Pain (1 - 3)  lidocaine   Patch 1 Patch Transdermal every 24 hours PRN right shoulder pain  morphine  - Injectable 0.25 milliGRAM(s) IV Push every 3 hours PRN resp distress, dyspnea, pain,

## 2018-04-28 NOTE — PROGRESS NOTE ADULT - PROBLEM SELECTOR PLAN 1
-Patient continues to desaturate likely secondary to fluid overload, chronic aspiration, and COPD, is currently on high flow oxygen and no BIPAP at night.  C/W Spiriva and duonebs, steroids and antibx   -Speech and Swallow evaluation recommends dysphagia 3 with thin liquids  -PT recommends subacute rehab

## 2018-04-28 NOTE — PROGRESS NOTE ADULT - SUBJECTIVE AND OBJECTIVE BOX
[facility] -- Daniella Kenyon, Nola, Ghanshyam, Jhonatan Quintero Savella  Office # [phone]      Follow Up:  AF, HF, MR, resp failure    Subjective/Observations:  Patient seen and examined.  Resting  in bed in no acute distress. She cont ot be  on high flow nasal cannula. Her dyspnea is improved. No complaints of chest pain,  or palpitations reported. No signs of orthopnea or PND. Daughter at bedside     REVIEW OF SYSTEMS: All other review of systems is negative unless indicated above    PAST MEDICAL & SURGICAL HISTORY:  CHF (congestive heart failure)  Compression fracture: Aug 2015  Carbon monoxide poisonin  Pulmonary emphysema, unspecified emphysema type  Secondary hypertension  Atrial fibrillation, unspecified type  History of arthroscopy of knee  History of appendectomy:   History of right shoulder replacement:       MEDICATIONS  (STANDING):  ALBUTerol    0.083% 2.5 milliGRAM(s) Nebulizer every 8 hours  apixaban 2.5 milliGRAM(s) Oral every 12 hours  buDESOnide   0.5 milliGRAM(s) Respule 0.5 milliGRAM(s) Inhalation two times a day  diltiazem    Tablet 60 milliGRAM(s) Oral four times a day  furosemide   Injectable 40 milliGRAM(s) IV Push two times a day  methylPREDNISolone sodium succinate Injectable 20 milliGRAM(s) IV Push daily  metoprolol tartrate 25 milliGRAM(s) Oral two times a day  pantoprazole   Suspension 40 milliGRAM(s) Oral before breakfast  piperacillin/tazobactam IVPB. 3.375 Gram(s) IV Intermittent every 8 hours  tiotropium 2.5 MICROgram(s)/olodaterol 2.5 MICROgram(s) Inhaler 2 Puff(s) Inhalation daily    MEDICATIONS  (PRN):  acetaminophen   Tablet. 650 milliGRAM(s) Oral every 6 hours PRN Mild Pain (1 - 3)  lidocaine   Patch 1 Patch Transdermal every 24 hours PRN right shoulder pain  morphine  - Injectable 0.25 milliGRAM(s) IV Push every 3 hours PRN resp distress, dyspnea, pain,      Allergies    No Known Allergies    Intolerances            Vital Signs Last 24 Hrs  T(C): 36.4 (2018 07:42), Max: 36.7 (2018 23:30)  T(F): 97.5 (2018 07:42), Max: 98 (2018 23:30)  HR: 82 (2018 07:54) (81 - 94)  BP: 106/73 (2018 07:42) (98/70 - 110/72)  BP(mean): --  RR: 16 (2018 07:42) (16 - 18)  SpO2: 99% (2018 07:54) (97% - 100%)    I&O's Summary    2018 07:01  -  2018 07:00  --------------------------------------------------------  IN: 300 mL / OUT: 300 mL / NET: 0 mL          PHYSICAL EXAM:  TELE: Afib 90's   Constitutional: NAD, awake and alert, cachectic   HEENT: Moist Mucous Membranes, Anicteric  Pulmonary: Non-labored,  +  wheezing  Cardiovascular: Regular, S1 and S2, + systolic murmur   Gastrointestinal: Bowel Sounds present, soft, nontender.   Lymph: No peripheral edema. No lymphadenopathy.  Skin: No visible rashes or ulcers.  Psych:  Mood & affect appropriate    LABS: All Labs Reviewed:                        11.0   14.9  )-----------( 290      ( 2018 04:36 )             30.5                         10.4   14.5  )-----------( 295      ( 2018 06:09 )             32.0                         10.5   12.8  )-----------( 315      ( 2018 07:09 )             31.4     2018 04:36    143    |  99     |  30     ----------------------------<  124    3.5     |  35     |  1.20   2018 06:09    142    |  100    |  31     ----------------------------<  124    3.8     |  34     |  1.20   2018 07:09    137    |  97     |  40     ----------------------------<  144    3.8     |  30     |  1.50     Ca    7.6        2018 04:36  Ca    8.1        2018 06:09  Ca    8.3        2018 07:09

## 2018-04-28 NOTE — PROGRESS NOTE ADULT - ASSESSMENT
83F w/pmh of COPD not on home 02, HTN, HLD, Afib on Eliquis, CHF, multiple compression fractures and ascending penetrating aortic ulcer presenting with sob.    Likely multifactorial 2/2 CHF exacerbation vs. COPD vs. aspiration pneumonia.  No clear evidence of acute ischemia,    - Still on HiFlow.  No events overnight.    - Follow Pulm recs.  Continue bronchodilators and steroids  - She seems to need diuresis. NO fluid overload on exam but her increased need for supplemental O2, elevated BNP, CXR with no improvement in her vascular congestion and the responsiveness of her sodium level after restarting lasix probably indicates volume overload.   - Cont Lasix 40 IV bid.  Please continue to maintain strict I/Os, monitor daily weights, Cr, and K.   - No acute ischemia  - Continue Lopressor and Cardizem. BP soft per flowsheet but has been receiving her doses with rate controlled AF on tele.      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  Repeat tte from yesterday is similar   - Pt has hx of chronic afib on Eliquis.  CHADsVASc score 5.  H& H remains stable Continue Eliquis 2.5 mg BID ( pt. has normal renal function, she met criteria for 5 bid, however. Continue to monitor CBC)  - Monitor and replete lytes, keep K>4, Mg>2  - Cardiovascular workup will depend on clinical course, all other workup per primary team  - Will continue to follow-up   - Patient is a DNR. Overall prognosis is poor    Kim Rosen NP  Cardiology 83F w/pmh of COPD not on home 02, HTN, HLD, Afib on Eliquis, CHF, multiple compression fractures and ascending penetrating aortic ulcer presenting with sob.    Likely multifactorial 2/2 CHF exacerbation vs. COPD vs. aspiration pneumonia.  No clear evidence of acute ischemia,    - Still on HiFlow.  No events overnight.    - Follow Pulm recs.  Continue bronchodilators and steroids  - She seems to need diuresis. NO fluid overload on exam but her increased need for supplemental O2, elevated BNP, CXR with no improvement in her vascular congestion and the responsiveness of her sodium level after restarting lasix probably indicates volume overload.   - Cont Lasix 40 IV bid.  Please continue to maintain strict I/Os, monitor daily weights, Cr, and K. Tolerating for now.  - No acute ischemia  - Continue Lopressor and Cardizem. BP soft per flowsheet but has been receiving her doses with rate controlled AF on tele.      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  Repeat tte from yesterday is similar   - Pt has hx of chronic afib on Eliquis.  CHADsVASc score 5.  H& H remains stable Continue Eliquis 2.5 mg BID ( pt. has normal renal function, she met criteria for 5 bid, however. Continue to monitor CBC)  - Monitor and replete lytes, keep K>4, Mg>2  - Cardiovascular workup will depend on clinical course, all other workup per primary team  - Will continue to follow-up   - Patient is a DNR. Overall prognosis is poor    Kim Rosen NP  Cardiology

## 2018-04-28 NOTE — PROGRESS NOTE ADULT - PROBLEM SELECTOR PLAN 4
-Suspected aspiration pneumonia, ABG consistent with hypoxia, mild leukocytosis  -CT revealed small right greater than left pleural effusions. Scattered patchy right upper lobe pulmonary infiltrates new from the prior study.  -Patient has been desaturating, Procalcitonin elevated  -Continue on zosyn

## 2018-04-28 NOTE — PROGRESS NOTE ADULT - SUBJECTIVE AND OBJECTIVE BOX
Date/Time Patient Seen:  		  Referring MD:   Data Reviewed	       Patient is a 83y old  Female who presents with a chief complaint of shortness of breath (2018 14:05)  in bed  seen and examined  vs and meds reviewed  on High Flow NC        Subjective/HPI     PAST MEDICAL & SURGICAL HISTORY:  CHF (congestive heart failure)  Compression fracture: Aug 2015  Carbon monoxide poisonin  Pulmonary emphysema, unspecified emphysema type  Secondary hypertension  Atrial fibrillation, unspecified type  History of arthroscopy of knee  History of appendectomy:   History of right shoulder replacement:   S/P appy        Medication list         MEDICATIONS  (STANDING):  ALBUTerol    0.083% 2.5 milliGRAM(s) Nebulizer every 8 hours  apixaban 2.5 milliGRAM(s) Oral every 12 hours  buDESOnide   0.5 milliGRAM(s) Respule 0.5 milliGRAM(s) Inhalation two times a day  diltiazem    Tablet 60 milliGRAM(s) Oral four times a day  furosemide   Injectable 40 milliGRAM(s) IV Push two times a day  methylPREDNISolone sodium succinate Injectable 20 milliGRAM(s) IV Push daily  metoprolol tartrate 25 milliGRAM(s) Oral two times a day  pantoprazole   Suspension 40 milliGRAM(s) Oral before breakfast  piperacillin/tazobactam IVPB. 3.375 Gram(s) IV Intermittent every 8 hours  tiotropium 2.5 MICROgram(s)/olodaterol 2.5 MICROgram(s) Inhaler 2 Puff(s) Inhalation daily    MEDICATIONS  (PRN):  acetaminophen   Tablet. 650 milliGRAM(s) Oral every 6 hours PRN Mild Pain (1 - 3)  lidocaine   Patch 1 Patch Transdermal every 24 hours PRN right shoulder pain  morphine  - Injectable 0.25 milliGRAM(s) IV Push every 3 hours PRN resp distress, dyspnea, pain,         Vitals log        ICU Vital Signs Last 24 Hrs  T(C): 36.4 (2018 05:22), Max: 36.7 (2018 23:30)  T(F): 97.5 (2018 05:22), Max: 98 (2018 23:30)  HR: 86 (2018 05:22) (64 - 94)  BP: 98/70 (2018 05:22) (95/59 - 110/72)  BP(mean): --  ABP: --  ABP(mean): --  RR: 17 (2018 05:22) (17 - 18)  SpO2: 100% (2018 05:22) (97% - 100%)           Input and Output:  I&O's Detail    2018 07:  -  2018 07:00  --------------------------------------------------------  IN:    Solution: 150 mL  Total IN: 150 mL    OUT:    Voided: 550 mL  Total OUT: 550 mL    Total NET: -400 mL      :  -  2018 06:33  --------------------------------------------------------  IN:    Oral Fluid: 300 mL  Total IN: 300 mL    OUT:    Voided: 300 mL  Total OUT: 300 mL    Total NET: 0 mL          Lab Data                        11.0   14.9  )-----------( 290      ( 2018 04:36 )             30.5     04-28    143  |  99  |  30<H>  ----------------------------<  124<H>  3.5   |  35<H>  |  1.20    Ca    7.6<L>      2018 04:36              Review of Systems	      Objective     Physical Examination    head at  heart s1s2  lung dec BS  abd soft      Pertinent Lab findings & Imaging      Elpidio:  NO   Adequate UO     I&O's Detail    2018 07:  -  2018 07:00  --------------------------------------------------------  IN:    Solution: 150 mL  Total IN: 150 mL    OUT:    Voided: 550 mL  Total OUT: 550 mL    Total NET: -400 mL      2018 07:  -  2018 06:33  --------------------------------------------------------  IN:    Oral Fluid: 300 mL  Total IN: 300 mL    OUT:    Voided: 300 mL  Total OUT: 300 mL    Total NET: 0 mL               Discussed with:     Cultures:	        Radiology

## 2018-04-29 LAB
ANION GAP SERPL CALC-SCNC: 12 MMOL/L — SIGNIFICANT CHANGE UP (ref 5–17)
BUN SERPL-MCNC: 33 MG/DL — HIGH (ref 7–23)
CALCIUM SERPL-MCNC: 8.5 MG/DL — SIGNIFICANT CHANGE UP (ref 8.5–10.1)
CHLORIDE SERPL-SCNC: 97 MMOL/L — SIGNIFICANT CHANGE UP (ref 96–108)
CO2 SERPL-SCNC: 33 MMOL/L — HIGH (ref 22–31)
CREAT SERPL-MCNC: 1.1 MG/DL — SIGNIFICANT CHANGE UP (ref 0.5–1.3)
GLUCOSE SERPL-MCNC: 188 MG/DL — HIGH (ref 70–99)
HCT VFR BLD CALC: 35.7 % — SIGNIFICANT CHANGE UP (ref 34.5–45)
HGB BLD-MCNC: 11.9 G/DL — SIGNIFICANT CHANGE UP (ref 11.5–15.5)
MCHC RBC-ENTMCNC: 33.4 GM/DL — SIGNIFICANT CHANGE UP (ref 32–36)
MCHC RBC-ENTMCNC: 34.9 PG — HIGH (ref 27–34)
MCV RBC AUTO: 104.5 FL — HIGH (ref 80–100)
PLATELET # BLD AUTO: 374 K/UL — SIGNIFICANT CHANGE UP (ref 150–400)
POTASSIUM SERPL-MCNC: 3.2 MMOL/L — LOW (ref 3.5–5.3)
POTASSIUM SERPL-SCNC: 3.2 MMOL/L — LOW (ref 3.5–5.3)
RBC # BLD: 3.42 M/UL — LOW (ref 3.8–5.2)
RBC # FLD: 14.7 % — HIGH (ref 10.3–14.5)
SODIUM SERPL-SCNC: 142 MMOL/L — SIGNIFICANT CHANGE UP (ref 135–145)
WBC # BLD: 23 K/UL — HIGH (ref 3.8–10.5)
WBC # FLD AUTO: 23 K/UL — HIGH (ref 3.8–10.5)

## 2018-04-29 PROCEDURE — 99233 SBSQ HOSP IP/OBS HIGH 50: CPT

## 2018-04-29 RX ORDER — POTASSIUM CHLORIDE 20 MEQ
40 PACKET (EA) ORAL ONCE
Qty: 0 | Refills: 0 | Status: COMPLETED | OUTPATIENT
Start: 2018-04-29 | End: 2018-04-29

## 2018-04-29 RX ORDER — ONDANSETRON 8 MG/1
4 TABLET, FILM COATED ORAL ONCE
Qty: 0 | Refills: 0 | Status: COMPLETED | OUTPATIENT
Start: 2018-04-29 | End: 2018-04-29

## 2018-04-29 RX ORDER — MORPHINE SULFATE 50 MG/1
0.25 CAPSULE, EXTENDED RELEASE ORAL ONCE
Qty: 0 | Refills: 0 | Status: DISCONTINUED | OUTPATIENT
Start: 2018-04-29 | End: 2018-04-29

## 2018-04-29 RX ORDER — POTASSIUM CHLORIDE 20 MEQ
20 PACKET (EA) ORAL
Qty: 0 | Refills: 0 | Status: DISCONTINUED | OUTPATIENT
Start: 2018-04-29 | End: 2018-04-29

## 2018-04-29 RX ADMIN — Medication 0.5 MILLIGRAM(S): at 07:59

## 2018-04-29 RX ADMIN — Medication 0.5 MILLIGRAM(S): at 20:03

## 2018-04-29 RX ADMIN — TIOTROPIUM BROMIDE AND OLODATEROL 2 PUFF(S): 3.124; 2.736 SPRAY, METERED RESPIRATORY (INHALATION) at 06:53

## 2018-04-29 RX ADMIN — PIPERACILLIN AND TAZOBACTAM 25 GRAM(S): 4; .5 INJECTION, POWDER, LYOPHILIZED, FOR SOLUTION INTRAVENOUS at 22:55

## 2018-04-29 RX ADMIN — Medication 40 MILLIEQUIVALENT(S): at 11:00

## 2018-04-29 RX ADMIN — Medication 20 MILLIGRAM(S): at 05:19

## 2018-04-29 RX ADMIN — Medication 25 MILLIGRAM(S): at 05:20

## 2018-04-29 RX ADMIN — Medication 650 MILLIGRAM(S): at 13:40

## 2018-04-29 RX ADMIN — Medication 25 MILLIGRAM(S): at 17:24

## 2018-04-29 RX ADMIN — PIPERACILLIN AND TAZOBACTAM 25 GRAM(S): 4; .5 INJECTION, POWDER, LYOPHILIZED, FOR SOLUTION INTRAVENOUS at 05:19

## 2018-04-29 RX ADMIN — APIXABAN 2.5 MILLIGRAM(S): 2.5 TABLET, FILM COATED ORAL at 05:20

## 2018-04-29 RX ADMIN — Medication 650 MILLIGRAM(S): at 13:01

## 2018-04-29 RX ADMIN — Medication 40 MILLIGRAM(S): at 17:24

## 2018-04-29 RX ADMIN — PIPERACILLIN AND TAZOBACTAM 25 GRAM(S): 4; .5 INJECTION, POWDER, LYOPHILIZED, FOR SOLUTION INTRAVENOUS at 13:00

## 2018-04-29 RX ADMIN — ALBUTEROL 2.5 MILLIGRAM(S): 90 AEROSOL, METERED ORAL at 14:44

## 2018-04-29 RX ADMIN — PANTOPRAZOLE SODIUM 40 MILLIGRAM(S): 20 TABLET, DELAYED RELEASE ORAL at 05:20

## 2018-04-29 RX ADMIN — Medication 40 MILLIGRAM(S): at 05:19

## 2018-04-29 RX ADMIN — Medication 650 MILLIGRAM(S): at 00:13

## 2018-04-29 RX ADMIN — MORPHINE SULFATE 0.25 MILLIGRAM(S): 50 CAPSULE, EXTENDED RELEASE ORAL at 22:51

## 2018-04-29 RX ADMIN — MORPHINE SULFATE 0.25 MILLIGRAM(S): 50 CAPSULE, EXTENDED RELEASE ORAL at 06:50

## 2018-04-29 RX ADMIN — ALBUTEROL 2.5 MILLIGRAM(S): 90 AEROSOL, METERED ORAL at 20:03

## 2018-04-29 RX ADMIN — ONDANSETRON 4 MILLIGRAM(S): 8 TABLET, FILM COATED ORAL at 18:31

## 2018-04-29 RX ADMIN — MORPHINE SULFATE 0.25 MILLIGRAM(S): 50 CAPSULE, EXTENDED RELEASE ORAL at 05:49

## 2018-04-29 RX ADMIN — ALBUTEROL 2.5 MILLIGRAM(S): 90 AEROSOL, METERED ORAL at 07:59

## 2018-04-29 RX ADMIN — APIXABAN 2.5 MILLIGRAM(S): 2.5 TABLET, FILM COATED ORAL at 17:24

## 2018-04-29 NOTE — PROGRESS NOTE ADULT - PROBLEM SELECTOR PLAN 3
No RVR reported overnight on tele.   Holding parameters were decreased for cardizem and metoprolol as patient was not receiving medications because BP was low.  rate controlled with cardizem 60 QID and metoprolol for now  -[id number] score of 5, continue anticoagulation with Eliquis 2.5 BID, she meets criteria for 5 bid, though continue 2.5 for now given her drop in Hb).   Watch Hb

## 2018-04-29 NOTE — PROGRESS NOTE ADULT - SUBJECTIVE AND OBJECTIVE BOX
[facility] -- Daniella Kenyon, Ghanshyam Vaca, Jhonatan Quintero Savella  Office # [phone]      Follow Up:  AF, HF, MR, resp failure    Subjective/Observations:  Pt seen and examined.  Lying flat with hi-flow O2 in place with NAD resting comfortably.  Awakens easily continues to report dyspnea with bouts of decreased oxygen saturation as per daughter.   No orthopnea noted.        REVIEW OF SYSTEMS: All other review of systems is negative unless indicated above    PAST MEDICAL & SURGICAL HISTORY:  CHF (congestive heart failure)  Compression fracture: Aug 2015  Carbon monoxide poisonin  Pulmonary emphysema, unspecified emphysema type  Secondary hypertension  Atrial fibrillation, unspecified type  History of arthroscopy of knee  History of appendectomy:   History of right shoulder replacement:       MEDICATIONS  (STANDING):  ALBUTerol    0.083% 2.5 milliGRAM(s) Nebulizer every 8 hours  apixaban 2.5 milliGRAM(s) Oral every 12 hours  buDESOnide   0.5 milliGRAM(s) Respule 0.5 milliGRAM(s) Inhalation two times a day  diltiazem    Tablet 60 milliGRAM(s) Oral four times a day  furosemide   Injectable 40 milliGRAM(s) IV Push two times a day  methylPREDNISolone sodium succinate Injectable 20 milliGRAM(s) IV Push daily  metoprolol tartrate 25 milliGRAM(s) Oral two times a day  pantoprazole   Suspension 40 milliGRAM(s) Oral before breakfast  piperacillin/tazobactam IVPB. 3.375 Gram(s) IV Intermittent every 8 hours  tiotropium 2.5 MICROgram(s)/olodaterol 2.5 MICROgram(s) Inhaler 2 Puff(s) Inhalation daily    MEDICATIONS  (PRN):  acetaminophen   Tablet. 650 milliGRAM(s) Oral every 6 hours PRN Mild Pain (1 - 3)  lidocaine   Patch 1 Patch Transdermal every 24 hours PRN right shoulder pain      Allergies    No Known Allergies    Intolerances            Vital Signs Last 24 Hrs  T(C): 36.2 (2018 08:00), Max: 36.8 (2018 21:10)  T(F): 97.2 (2018 08:00), Max: 98.2 (2018 21:10)  HR: 71 (2018 08:00) (71 - 97)  BP: 112/72 (2018 08:00) (104/90 - 128/87)  BP(mean): --  RR: 19 (2018 08:00) (16 - 22)  SpO2: 90% (2018 08:00) (89% - 95%)    I&O's Summary    2018 07:01  -  2018 07:00  --------------------------------------------------------  IN: 0 mL / OUT: 550 mL / NET: -550 mL          PHYSICAL EXAM:  TELE: AF 90s no events other desat on pulse ox to 85%  Constitutional: NAD, awakens easily, frail, cachectic  HEENT: Moist Mucous Membranes, Anicteric  Pulmonary: Non-labored, breath sounds diminished bilaterally in bases with scattered wheezing.  Cardiovascular: irregular, S1 and S2, No murmurs, rubs, gallops or clicks  Gastrointestinal: Bowel Sounds present, soft, nontender.   Lymph: No peripheral edema. No lymphadenopathy.  Skin: No visible rashes or ulcers.  Psych:  Mood & affect flat    LABS: All Labs Reviewed:                        11.9   23.0  )-----------( 374      ( 2018 06:31 )             35.7                         11.0   14.9  )-----------( 290      ( 2018 04:36 )             30.5                         10.4   14.5  )-----------( 295      ( 2018 06:09 )             32.0     2018 06:31    142    |  97     |  33     ----------------------------<  188    3.2     |  33     |  1.10   2018 04:36    143    |  99     |  30     ----------------------------<  124    3.5     |  35     |  1.20   2018 06:09    142    |  100    |  31     ----------------------------<  124    3.8     |  34     |  1.20     Ca    8.5        2018 06:31  Ca    7.6        2018 04:36  Ca    8.1        2018 06:09    < from: TTE Echo Doppler w/o Cont (18 @ 10:50) >   EXAM:  ECHO TTE W/O CON COMP W/DOPPLR         PROCEDURE DATE:  2018        INTERPRETATION:  INDICATION: Mitral valve disease    Blood Pressure 103/64    Height 152     Weight 46       BSA 1.36    Dimensions:    LA 4.3       Normal Values: 2.0 - 4.0 cm    Ao 3.1        Normal Values: 2.0 - 3.8 cm  SEPTUM 0.7       Normal Values: 0.6 - 1.2 cm  PWT 0.7       Normal Values: 0.6 - 1.1 cm  LVIDd 3.9         Normal Values: 3.0 - 5.6 cm  LVIDs 2.1         Normal Values: 1.8 - 4.0 cm    Derived Variables:  LVMI     g/m2  RWT      Fractional Short      Ejection Fraction 65    Doppler Peak v. AoV=   (m/sec)    OBSERVATIONS:    Mitral Valve: MAC, moderate to severe MR., may be underestimated. There   is superior displacement of the posterior mitral valve leaflet with   systole, without bindu prolapse.  Aortic Valve/Aorta: Calcified trileaflet aortic valve.  Tricuspid Valve: normal with moderate TR.  Pulmonic Valve: normal  Left Atrium: Enlarged  Right Atrium: Enlarged  Left Ventricle: normalLV size and systolic function, estimated LVEF of   65%.  Right Ventricle: The right ventricle is dilated. Systolic function cannot   be accurately assessed.  Pericardium/Pleura: Right pleural effusion, no significant pericardial   effusion.  Pulmonary/RV Pressure: estimated PA systolic pressure of 53 mmHg assuming   an RA pressure of 10 mmHg.  LV Diastolic Function: Indeterminate    Normal left ventricular size and systolic function, estimated LVEF of   65%. The right ventricle appears dilated with indeterminate systolic   function. Diastolic function is indeterminate. I atrial enlargement. The   aortic root is normal in size. Mitral annular calcification. There is   superior displacement of the posterior mitral valve leaflet with systole,   without bindu prolapse. There is at least moderate to severe MR. The   aortic valve is calcified without stenosis or insufficiency. Moderate TR.   Estimated PA systolic pressure is     53 mm Hg, assuming an RA pressure   of 10 mmHg. No significant pericardial effusion. Right pleural effusion                  ANDIE VALLEJO M.D., ATTENDING CARDIOLOGIST  This document has been electronically signed. 2018  9:01AM    < end of copied text >    < from: Xray Chest 1 View- PORTABLE-Urgent (.18 @ 08:04) >  EXAM:  XR CHEST PORTABLE URGENT 1V                            PROCEDURE DATE:  2018          INTERPRETATION:  Clinical information: Respiratory distress. Evaluate for   aspiration or pulmonary edema.    Technique: Frontal view of the chest.    Comparison: Prior chest x-ray examination from 2018.    Findings: Pulmonary edema and/or vascular congestion appears unchanged.   Small bilateral pleural effusions appear unchanged. The heart size is at   the upper limits of normal. There are mild multilevel degenerative   changes of the thoracic spine. A right-sided shoulder replacement is   again noted.    A rim calcified rounded structure within the left upper quadrant of the   abdomen appears unchanged.    IMPRESSION: As above, no interval change.                YING GUAN M.D., ATTENDING RADIOLOGIST  This document has been electronically signed. 2018  9:00AM    < end of copied text >

## 2018-04-29 NOTE — PROGRESS NOTE ADULT - ASSESSMENT
83F w/pmh of COPD not on home 02, HTN, HLD, Afib on Eliquis, CHF, multiple compression fractures and ascending penetrating aortic ulcer presenting with sob.    Likely multifactorial 2/2 CHF exacerbation vs. COPD vs. aspiration pneumonia.  No clear evidence of acute ischemia,    - Still on HiFlow.  No events overnight continues with hypoxemia and dyspnea.    - Follow Pulm recs.  Continue bronchodilators and steroids  - She seems to need diuresis. NO fluid overload on exam but her increased need for supplemental O2, elevated BNP, CXR with no improvement in her vascular congestion and the responsiveness of her sodium level after restarting lasix probably indicates volume overload.  Creatinine improving sodium and bicarb now rising.  Monitor closely.  May consider changing to PO.    - Cont Lasix 40 IV bid.  Please continue to maintain strict I/Os, monitor daily weights, Cr, and K. Tolerating for now.  Creatinine improving sodium and bicarb now rising.  Monitor closely.  May consider changing to PO.  - No acute ischemia  - Continue Lopressor and Cardizem. BP soft per flowsheet but has been receiving her doses with rate controlled AF on tele.      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  Repeat tte from yesterday is similar   - Pt has hx of chronic afib on Eliquis.  CHADsVASc score 5.  H& H remains stable Continue Eliquis 2.5 mg BID ( pt. has normal renal function, she met criteria for 5 bid, however. Continue to monitor CBC)  - Monitor and replete lytes, keep K>4, Mg>2  - Cardiovascular workup will depend on clinical course, all other workup per primary team  - Will continue to follow-up   - Patient is a DNR. Overall prognosis is poor    Madiha Da Silva, ELVA-C  Cardiology 83F w/pmh of COPD not on home 02, HTN, HLD, Afib on Eliquis, CHF, multiple compression fractures and ascending penetrating aortic ulcer presenting with sob.    Likely multifactorial 2/2 CHF exacerbation vs. COPD vs. aspiration pneumonia.  No clear evidence of acute ischemia,    - Still on HiFlow.  No events overnight continues with hypoxemia and dyspnea. Maybe getting too anxious. Anxiolytic would be helpful.    - Follow Pulm recs.  Continue bronchodilators and steroids  - She seems to need diuresis. NO fluid overload on exam but her increased need for supplemental O2, elevated BNP, CXR with no improvement in her vascular congestion and the responsiveness of her sodium level after restarting lasix probably indicates volume overload.  Creatinine improving sodium and bicarb now rising.  Monitor closely.  May consider changing to PO.    - Cont Lasix 40 IV bid.  Please continue to maintain strict I/Os, monitor daily weights, Cr, and K. Tolerating for now.  Creatinine improving sodium and bicarb now rising.  Monitor closely.  May consider changing to PO.  - No acute ischemia  - Continue Lopressor and Cardizem. BP soft per flowsheet but has been receiving her doses with rate controlled AF on tele.      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  Repeat tte from yesterday is similar   - Pt has hx of chronic afib on Eliquis.  CHADsVASc score 5.  H& H remains stable Continue Eliquis 2.5 mg BID ( pt. has normal renal function, she met criteria for 5 bid, however. Continue to monitor CBC)  - Monitor and replete lytes, keep K>4, Mg>2  - Cardiovascular workup will depend on clinical course, all other workup per primary team  - Formal speech and swallow  - Will continue to follow-up   - Patient is a DNR. Overall prognosis is poor. [facility] meeting bartolo Da Silva NP-C  Cardiology

## 2018-04-29 NOTE — PROGRESS NOTE ADULT - SUBJECTIVE AND OBJECTIVE BOX
83F w/pmh of COPD not on home 02, HTN, HLD, Afib on Eliquis, CHF, multiple compression fractures and ascending penetrating aortic ulcer presenting with sob for 3-4 days, admitted for CHF exacerbation with underyling copd. rAPID Rapid response called because patient desat. to 60s and was in afib with RVR.    INTERVAL HPI: Pt seen and examined bedside, with daughter. c/o dysnea inspite of HF O2 80%. Patient was  hypoxic overnight, remained on high flow throughout the night.   OVERNIGHT EVENTS:  Vital Signs Last 24 Hrs  T(C): 36.2 (29 Apr 2018 08:00), Max: 36.8 (28 Apr 2018 21:10)  T(F): 97.2 (29 Apr 2018 08:00), Max: 98.2 (28 Apr 2018 21:10)  HR: 71 (29 Apr 2018 08:00) (71 - 97)  BP: 112/72 (29 Apr 2018 08:00) (104/90 - 128/87)  BP(mean): --  RR: 19 (29 Apr 2018 08:00) (19 - 22)  SpO2: 90% (29 Apr 2018 08:00) (89% - 95%)    REVIEW OF SYSTEM:    Constitutional: No fever, chills, admits generalized weakness, no fevers or chills  Neuro: No headache, numbness, weakness  Resp: no cough, wheezing, + shortness of breath  CVS: No chest pain, palpitations, leg swelling  GI: No abdominal pain, nausea, vomiting, diarrhea   : No dysuria, frequency, incontinence  Skin: No itching, burning, rashes, or lesions   Msk: No joint pain or swelling  Psych: No depression, anxiety, mood swings          PHYSICAL EXAM:  GENERAL: NAD, well-developed  HEAD:  Atraumatic, Normocephalic  EYES: EOMI, PERRLA, conjunctiva and sclera clear  ENMT: No tonsillar erythema, exudates, or enlargement; Moist mucous membranes,   NECK: Supple, No JVD, Normal thyroid  HEART: Regular rate and rhythm; No murmurs, rubs, or gallops  RESPIRATORY: Decreased breath sounds in bilateral bases, increase work of breathing, on High flow oxygen  ABDOMEN: Soft, Nontender, Nondistended; Bowel sounds present  NEUROLOGY: A&Ox3, nonfocal, moving all extremities.  EXTREMITIES:  2+ Peripheral Pulses, No clubbing, cyanosis, or edema  SKIN: warm, dry, normal color, no rash or abnormal lesions        LABS:                        11.9   23.0  )-----------( 374      ( 29 Apr 2018 06:31 )             35.7     29 Apr 2018 06:31    142    |  97     |  33     ----------------------------<  188    3.2     |  33     |  1.10     Ca    8.5        29 Apr 2018 06:31  Mg     2.1       29 Apr 2018 06:31          CAPILLARY BLOOD GLUCOSE        UCx       RADIOLOGY & ADDITIONAL TESTS:              MEDICATIONS  (STANDING):  ALBUTerol    0.083% 2.5 milliGRAM(s) Nebulizer every 8 hours  apixaban 2.5 milliGRAM(s) Oral every 12 hours  buDESOnide   0.5 milliGRAM(s) Respule 0.5 milliGRAM(s) Inhalation two times a day  diltiazem    Tablet 60 milliGRAM(s) Oral four times a day  furosemide   Injectable 40 milliGRAM(s) IV Push two times a day  methylPREDNISolone sodium succinate Injectable 20 milliGRAM(s) IV Push daily  metoprolol tartrate 25 milliGRAM(s) Oral two times a day  pantoprazole   Suspension 40 milliGRAM(s) Oral before breakfast  piperacillin/tazobactam IVPB. 3.375 Gram(s) IV Intermittent every 8 hours  tiotropium 2.5 MICROgram(s)/olodaterol 2.5 MICROgram(s) Inhaler 2 Puff(s) Inhalation daily    MEDICATIONS  (PRN):  acetaminophen   Tablet. 650 milliGRAM(s) Oral every 6 hours PRN Mild Pain (1 - 3)  lidocaine   Patch 1 Patch Transdermal every 24 hours PRN right shoulder pain  morphine  - Injectable 0.25 milliGRAM(s) IV Push every 3 hours PRN resp distress, dyspnea, pain,

## 2018-04-29 NOTE — CHART NOTE - NSCHARTNOTEFT_GEN_A_CORE
Called by RN for SpO2 of 85% on high flow NC. Patient seen and examined with RT and daughter at bedside. She stated she felt "nausea", and had difficulty eating today, "felt like food got caught in her throat". Denied chest pain, palpitations, pain with swallowing. RT increased high flow to 90% with improvement of SpO2.     Vital Signs Last 24 Hrs  T(C): 36.6 (29 Apr 2018 16:53), Max: 36.8 (28 Apr 2018 21:10)  T(F): 97.8 (29 Apr 2018 16:53), Max: 98.2 (28 Apr 2018 21:10)  HR: 99 (29 Apr 2018 16:53) (71 - 99)  BP: 134/74 (29 Apr 2018 16:53) (112/72 - 134/74)  BP(mean): --  RR: 17 (29 Apr 2018 16:53) (17 - 20)  SpO2: 90% (29 Apr 2018 16:53) (89% - 93%)    PE:  GENERAL: elderly frail  female in no acute distress. She had kidney bean bucket with small volume of emesis on her lap.  HEAD: NC/AT  CARDIO: S1S2, RRR  PULM: clear to auscultation, no wheezing, rales. Patient on high flow 90% with SpO2 now at goal.     A/P: 83F w/pmh of COPD not on home 02, HTN, HLD, Afib on Eliquis, CHF, multiple compression fractures and ascending penetrating aortic ulcer presenting with sob for 3-4 days, admitted for CHF exacerbation with underyling copd. rAPID Rapid response called because patient desat. to 60s and was in afib with RVR.  - Hypoxia 2/2 acute respiratory failure from COPD and hypoxia from positional changes. Continue with high flow.   - Zofran x1 ordered for nausea. REcommend placing patient on Bipap if she continues to be hypoxic and is no longer nauseus.   - Morphine 0.25 x1 prn for anxiety as she received it this morning and she stated it helped. Daughter states she was taken off Xanax because it "took her down too much".     Dann RAM PGY-1

## 2018-04-29 NOTE — PROGRESS NOTE ADULT - PROBLEM SELECTOR PLAN 1
-Patient continues to desaturate likely secondary to fluid overload, chronic aspiration, and COPD, is currently on high flow oxygen.  C/W Spiriva and duonebs, steroids and antibx.  received morphine this am for resp distress.   -Speech and Swallow evaluation recommends dysphagia 3 with thin liquids.  Prognosis guarded and daughter is not ready for hospice care consult.  -PT recommends subacute rehab

## 2018-04-29 NOTE — PROGRESS NOTE ADULT - PROBLEM SELECTOR PLAN 1
oral and skin care   chest pt  assist with ADL  on High Flow NC, keep sat > 86 pct, prognosis guarded, pt is DNR DNI  will give Morphine 0.25 mg IVP this am for resp distress  on lasix  on nebs  on systemic steroids  on emp ABX  no real improvement  discussed with family goals of care, they are willing to explore hospice and other options  will cont supportive medical regimen and care

## 2018-04-29 NOTE — PROGRESS NOTE ADULT - PROBLEM SELECTOR PLAN 2
-ECHO 3/2016 showed moderate ischemic myopathy, moderate-severe MR, severe dilated left atrium, normal LV, EF 67%.  Repeat tte from yesterday is similar   C/W lasix and PO Kcl for hypokalemia  -Continue strict I and O

## 2018-04-29 NOTE — PROGRESS NOTE ADULT - SUBJECTIVE AND OBJECTIVE BOX
Date/Time Patient Seen:  		  Referring MD:   Data Reviewed	       Patient is a 83y old  Female who presents with a chief complaint of shortness of breath (2018 14:05)  in bed  seen and examined  vs and meds reviewed  pt is awake, verbal, alert, c.o. Dyspnea and noted to have hypoxemia        Subjective/HPI     PAST MEDICAL & SURGICAL HISTORY:  CHF (congestive heart failure)  Compression fracture: Aug 2015  Carbon monoxide poisonin  Pulmonary emphysema, unspecified emphysema type  Secondary hypertension  Atrial fibrillation, unspecified type  History of arthroscopy of knee  History of appendectomy:   History of right shoulder replacement:   S/P appy        Medication list         MEDICATIONS  (STANDING):  ALBUTerol    0.083% 2.5 milliGRAM(s) Nebulizer every 8 hours  apixaban 2.5 milliGRAM(s) Oral every 12 hours  buDESOnide   0.5 milliGRAM(s) Respule 0.5 milliGRAM(s) Inhalation two times a day  diltiazem    Tablet 60 milliGRAM(s) Oral four times a day  furosemide   Injectable 40 milliGRAM(s) IV Push two times a day  methylPREDNISolone sodium succinate Injectable 20 milliGRAM(s) IV Push daily  metoprolol tartrate 25 milliGRAM(s) Oral two times a day  pantoprazole   Suspension 40 milliGRAM(s) Oral before breakfast  piperacillin/tazobactam IVPB. 3.375 Gram(s) IV Intermittent every 8 hours  tiotropium 2.5 MICROgram(s)/olodaterol 2.5 MICROgram(s) Inhaler 2 Puff(s) Inhalation daily    MEDICATIONS  (PRN):  acetaminophen   Tablet. 650 milliGRAM(s) Oral every 6 hours PRN Mild Pain (1 - 3)  lidocaine   Patch 1 Patch Transdermal every 24 hours PRN right shoulder pain         Vitals log        ICU Vital Signs Last 24 Hrs  T(C): 36.3 (2018 05:06), Max: 36.8 (2018 21:10)  T(F): 97.3 (2018 05:06), Max: 98.2 (2018 21:10)  HR: 97 (2018 05:06) (76 - 97)  BP: 128/87 (2018 05:06) (102/70 - 128/87)  BP(mean): --  ABP: --  ABP(mean): --  RR: 20 (2018 05:06) (16 - 22)  SpO2: 89% (2018 05:06) (89% - 100%)           Input and Output:  I&O's Detail    2018 07:  -  2018 07:00  --------------------------------------------------------  IN:    Oral Fluid: 300 mL  Total IN: 300 mL    OUT:    Voided: 300 mL  Total OUT: 300 mL    Total NET: 0 mL        -  2018 06:35  --------------------------------------------------------  IN:  Total IN: 0 mL    OUT:    Voided: 550 mL  Total OUT: 550 mL    Total NET: -550 mL          Lab Data                        11.0   14.9  )-----------( 290      ( 2018 04:36 )             30.5     04-28    143  |  99  |  30<H>  ----------------------------<  124<H>  3.5   |  35<H>  |  1.20    Ca    7.6<L>      2018 04:36              Review of Systems	      Objective     Physical Examination    head at  heart s1s2  lung dec BS  abd soft  cn grossly int      Pertinent Lab findings & Imaging      Elpidio:  NO   Adequate UO     I&O's Detail    2018 07:  -  2018 07:00  --------------------------------------------------------  IN:    Oral Fluid: 300 mL  Total IN: 300 mL    OUT:    Voided: 300 mL  Total OUT: 300 mL    Total NET: 0 mL      2018 07:  -  2018 06:35  --------------------------------------------------------  IN:  Total IN: 0 mL    OUT:    Voided: 550 mL  Total OUT: 550 mL    Total NET: -550 mL               Discussed with:     Cultures:	        Radiology

## 2018-04-30 VITALS
SYSTOLIC BLOOD PRESSURE: 138 MMHG | OXYGEN SATURATION: 81 % | HEART RATE: 123 BPM | DIASTOLIC BLOOD PRESSURE: 81 MMHG | RESPIRATION RATE: 20 BRPM | TEMPERATURE: 98 F

## 2018-04-30 LAB
ANION GAP SERPL CALC-SCNC: 8 MMOL/L — SIGNIFICANT CHANGE UP (ref 5–17)
BUN SERPL-MCNC: 43 MG/DL — HIGH (ref 7–23)
CALCIUM SERPL-MCNC: 8.7 MG/DL — SIGNIFICANT CHANGE UP (ref 8.5–10.1)
CHLORIDE SERPL-SCNC: 101 MMOL/L — SIGNIFICANT CHANGE UP (ref 96–108)
CO2 SERPL-SCNC: 36 MMOL/L — HIGH (ref 22–31)
CREAT SERPL-MCNC: 1.4 MG/DL — HIGH (ref 0.5–1.3)
GLUCOSE SERPL-MCNC: 149 MG/DL — HIGH (ref 70–99)
HCT VFR BLD CALC: 37 % — SIGNIFICANT CHANGE UP (ref 34.5–45)
HGB BLD-MCNC: 11.8 G/DL — SIGNIFICANT CHANGE UP (ref 11.5–15.5)
MCHC RBC-ENTMCNC: 32 GM/DL — SIGNIFICANT CHANGE UP (ref 32–36)
MCHC RBC-ENTMCNC: 33.5 PG — SIGNIFICANT CHANGE UP (ref 27–34)
MCV RBC AUTO: 104.7 FL — HIGH (ref 80–100)
PLATELET # BLD AUTO: 322 K/UL — SIGNIFICANT CHANGE UP (ref 150–400)
POTASSIUM SERPL-MCNC: 4.5 MMOL/L — SIGNIFICANT CHANGE UP (ref 3.5–5.3)
POTASSIUM SERPL-SCNC: 4.5 MMOL/L — SIGNIFICANT CHANGE UP (ref 3.5–5.3)
RBC # BLD: 3.54 M/UL — LOW (ref 3.8–5.2)
RBC # FLD: 14.7 % — HIGH (ref 10.3–14.5)
SODIUM SERPL-SCNC: 145 MMOL/L — SIGNIFICANT CHANGE UP (ref 135–145)
WBC # BLD: 21.7 K/UL — HIGH (ref 3.8–10.5)
WBC # FLD AUTO: 21.7 K/UL — HIGH (ref 3.8–10.5)

## 2018-04-30 PROCEDURE — 99497 ADVNCD CARE PLAN 30 MIN: CPT | Mod: 25

## 2018-04-30 PROCEDURE — 99233 SBSQ HOSP IP/OBS HIGH 50: CPT

## 2018-04-30 PROCEDURE — 71045 X-RAY EXAM CHEST 1 VIEW: CPT | Mod: 26

## 2018-04-30 PROCEDURE — 99233 SBSQ HOSP IP/OBS HIGH 50: CPT | Mod: GC

## 2018-04-30 RX ORDER — BENZOCAINE 10 %
1 GEL (GRAM) MUCOUS MEMBRANE
Qty: 0 | Refills: 0 | Status: DISCONTINUED | OUTPATIENT
Start: 2018-04-30 | End: 2018-05-01

## 2018-04-30 RX ORDER — HYDROMORPHONE HYDROCHLORIDE 2 MG/ML
1 INJECTION INTRAMUSCULAR; INTRAVENOUS; SUBCUTANEOUS
Qty: 0 | Refills: 0 | Status: DISCONTINUED | OUTPATIENT
Start: 2018-04-30 | End: 2018-05-01

## 2018-04-30 RX ORDER — ONDANSETRON 8 MG/1
4 TABLET, FILM COATED ORAL ONCE
Qty: 0 | Refills: 0 | Status: COMPLETED | OUTPATIENT
Start: 2018-04-30 | End: 2018-04-30

## 2018-04-30 RX ORDER — ATROPINE SULFATE 1 %
2 DROPS OPHTHALMIC (EYE)
Qty: 0 | Refills: 0 | Status: DISCONTINUED | OUTPATIENT
Start: 2018-04-30 | End: 2018-05-01

## 2018-04-30 RX ORDER — NYSTATIN 500MM UNIT
500000 POWDER (EA) MISCELLANEOUS
Qty: 0 | Refills: 0 | Status: DISCONTINUED | OUTPATIENT
Start: 2018-04-30 | End: 2018-05-01

## 2018-04-30 RX ADMIN — ALBUTEROL 2.5 MILLIGRAM(S): 90 AEROSOL, METERED ORAL at 20:32

## 2018-04-30 RX ADMIN — ONDANSETRON 4 MILLIGRAM(S): 8 TABLET, FILM COATED ORAL at 01:03

## 2018-04-30 RX ADMIN — PIPERACILLIN AND TAZOBACTAM 25 GRAM(S): 4; .5 INJECTION, POWDER, LYOPHILIZED, FOR SOLUTION INTRAVENOUS at 13:28

## 2018-04-30 RX ADMIN — Medication 40 MILLIGRAM(S): at 17:10

## 2018-04-30 RX ADMIN — ALBUTEROL 2.5 MILLIGRAM(S): 90 AEROSOL, METERED ORAL at 08:18

## 2018-04-30 RX ADMIN — Medication 0.5 MILLIGRAM(S): at 08:18

## 2018-04-30 RX ADMIN — HYDROMORPHONE HYDROCHLORIDE 1 MILLIGRAM(S): 2 INJECTION INTRAMUSCULAR; INTRAVENOUS; SUBCUTANEOUS at 22:38

## 2018-04-30 RX ADMIN — HYDROMORPHONE HYDROCHLORIDE 1 MILLIGRAM(S): 2 INJECTION INTRAMUSCULAR; INTRAVENOUS; SUBCUTANEOUS at 20:16

## 2018-04-30 RX ADMIN — Medication 500000 UNIT(S): at 17:09

## 2018-04-30 RX ADMIN — Medication 0.5 MILLIGRAM(S): at 20:31

## 2018-04-30 RX ADMIN — MORPHINE SULFATE 0.25 MILLIGRAM(S): 50 CAPSULE, EXTENDED RELEASE ORAL at 00:31

## 2018-04-30 RX ADMIN — ALBUTEROL 2.5 MILLIGRAM(S): 90 AEROSOL, METERED ORAL at 16:02

## 2018-04-30 RX ADMIN — APIXABAN 2.5 MILLIGRAM(S): 2.5 TABLET, FILM COATED ORAL at 17:10

## 2018-04-30 RX ADMIN — PIPERACILLIN AND TAZOBACTAM 25 GRAM(S): 4; .5 INJECTION, POWDER, LYOPHILIZED, FOR SOLUTION INTRAVENOUS at 06:43

## 2018-04-30 RX ADMIN — Medication 25 MILLIGRAM(S): at 17:09

## 2018-04-30 RX ADMIN — Medication 20 MILLIGRAM(S): at 06:42

## 2018-04-30 RX ADMIN — PIPERACILLIN AND TAZOBACTAM 25 GRAM(S): 4; .5 INJECTION, POWDER, LYOPHILIZED, FOR SOLUTION INTRAVENOUS at 21:17

## 2018-04-30 RX ADMIN — HYDROMORPHONE HYDROCHLORIDE 1 MILLIGRAM(S): 2 INJECTION INTRAMUSCULAR; INTRAVENOUS; SUBCUTANEOUS at 18:22

## 2018-04-30 RX ADMIN — Medication 40 MILLIGRAM(S): at 06:42

## 2018-04-30 NOTE — CHART NOTE - NSCHARTNOTEFT_GEN_A_CORE
Resident Rapid Response Note    Patient is a 83y old  Female who presents with a chief complaint of shortness of breath (20 Apr 2018 14:05)      Rapid response was called at on this 83y Female patient for SOB and desaturation to 82-83 percent on nonrebreather. 83F w/pmh of COPD not on home 02, HTN, HLD, Afib on Eliquis, CHF, multiple compression fractures and ascending penetrating aortic ulcer presenting with sob for 3-4 days, admitted for CHF exacerbation with underyling copd. RR called because patient desatting to low 80s. Patient had been on highflow NC desatted to 84, felt nauseous. IV Zofran ordered/given and patient placed on nonrebreather, which she had been on all day. O2 sat initially sarah to 87 percent, but then patient felt increasingly SOB O2 sat fell further and RR called.    Patient was seen and examined at the bedside by the rapid response team and primary team. Dr. Reyes at bedside.    Rapid Response Vital Signs:  BP: 141/67  HR: 97   RR: 32  SpO2: 88% on BIPAP  Temp: 97.6  FS: 196    Vital Signs Last 24 Hrs  T(C): 36.5 (30 Apr 2018 01:57), Max: 36.6 (29 Apr 2018 16:53)  T(F): 97.7 (30 Apr 2018 01:57), Max: 97.8 (29 Apr 2018 16:53)  HR: 97 (30 Apr 2018 01:57) (66 - 100)  BP: 124/76 (30 Apr 2018 01:57) (112/72 - 134/74)  BP(mean): --  RR: 20 (30 Apr 2018 01:57) (17 - 24)  SpO2: 97% (30 Apr 2018 01:57) (86% - 97%)    Physical Exam:  General: In moderate respiratory distress  HEENT: NCAT  Neck: JVD 2 cm   Neurology: A&Ox4, nonfocal, CN II-XII grossly intact, sensation intact  Respiratory: decreased breath sounds, worse on at left base  CV: tachy, +S1/S2, no murmurs, rubs or gallops  Abdominal: Soft, NT, ND +BSx4  Extremities: No C/C/E, + peripheral pulses  MS: no joint erythema or warmth, no joint swelling   Skin: +PVD     LABS:                        11.9   23.0  )-----------( 374      ( 29 Apr 2018 06:31 )             35.7     29 Apr 2018 06:31    142    |  97     |  33     ----------------------------<  188    3.2     |  33     |  1.10     Ca    8.5        29 Apr 2018 06:31  Mg     2.1       29 Apr 2018 06:31          CAPILLARY BLOOD GLUCOSE      POCT Blood Glucose.: 196 mg/dL (30 Apr 2018 01:42)        RADIOLOGY & ADDITIONAL TESTS:  CXR: awaiting official read. Blunting costophrenic angle left lung base. Vascular congestion     Assessment/Plan:  83F w/pmh of COPD not on home 02, HTN, HLD, Afib on Eliquis, CHF, multiple compression fractures and ascending penetrating aortic ulcer presenting with sob for 3-4 days, admitted for CHF exacerbation with underyling copd. RR called for increasing SOB and desatting to low 80s on nonrebreather.  Patient placed on BIPAP with improvement in O2 sat to mid 90s  CXR ordered, showed increased blunting of left costophrenic angle and vascular congestion    -Will keep patient on BIPAP, until AM. Recommend BIPAP QHS  -Daughter requesting patient be placed back on Remeron, because patient feels very anxious. Will discuss with day team  -plan discussed with Dr. Reyesm attending hospitalist    -Will continue to follow, RN to call if any changes.    Dr. Rodriguez  PGY-1 [phone]

## 2018-04-30 NOTE — PROGRESS NOTE ADULT - PROBLEM SELECTOR PLAN 10
DVT ppx: On Eliquis  Compression fractures: T7, 12, L1: Pain control with Tylenol  Protonix for GERD
DVT ppx: On Eliquis  Compression fractures: T7, 12, L1: Pain control with Tylenol  Protonix for GERD
DVT ppx: On Eliquis  Compression fractures: T7, 12, L1: Pain control with Tylenol
DVT ppx: On Eliquis  Compression fractures: T7, 12, L1: Pain control with Tylenol  Protonix for GERD
DVT ppx: On Eliquis  Compression fractures: T7, 12, L1: Pain control with Tylenol
DVT ppx: On Eliquis  Compression fractures: T7, 12, L1: Pain control with Tylenol
DVT ppx: On Eliquis  Compression fractures: T7, 12, L1: Pain control with Tylenol  Protonix for GERD
DVT ppx: On Eliquis  Compression fractures: T7, 12, L1: Pain control with Tylenol

## 2018-04-30 NOTE — PROGRESS NOTE ADULT - PROBLEM SELECTOR PROBLEM 9
HLD (hyperlipidemia)
Secondary hypertension
HLD (hyperlipidemia)
Secondary hypertension

## 2018-04-30 NOTE — CHART NOTE - NSCHARTNOTEFT_GEN_A_CORE
Rapid Reponse 2 hour follow up Rapid Reponse 2 hour follow up.    2 hour follow up for RR called on patient for hypoxia and SOB. Patient satting well on BIPAP (, satting at 97 percent). Does not like wearing BIPAP but expressed understanding that she must wear it for the rest of the night. Complained of sore throat, requesting lozenge.    VITAL SIGNS (Last 24 hrs):  T(C): 36.5 (18 @ 01:57), Max: 36.6 (18 @ 16:53)  HR: 77 (18 @ 02:07) (66 - 100)  BP: 124/76 (18 @ 01:57) (112/72 - 134/74)  RR: 20 (18 @ 01:57) (17 - 24)  SpO2: 94% (18 @ 02:07) (86% - 97%)    Daily Weight in k.2 (2018 05:06)    I&O's Summary    2018 07:  -  2018 07:00  --------------------------------------------------------  IN: 0 mL / OUT: 550 mL / NET: -550 mL    2018 07:01  -  2018 04:17  --------------------------------------------------------  IN: 0 mL / OUT: 800 mL / NET: -800 mL    Physical Exam:  General: No Acute Distress on BIPAP  Neurology: AA&Ox4,  Respiratory: Decreased breath sounds in LLL  CV: tachycardic. No murmurs, gallops, rubs  Abdominal: Soft, Non-Tender, Non-Distended +Bowel Soundsx4  Extremities: No Clubbing, cyanosis or edema, + peripheral pulses  Musculoskeletal: Normal Range of motion, no joint erythema or warmth, no joint swelling   Skin: +PVD skin changes    A/P 83F w/pmh of COPD not on home 02, HTN, HLD, Afib on Eliquis, CHF, multiple compression fractures and ascending penetrating aortic ulcer presenting with sob for 3-4 days, admitted for CHF exacerbation with underyling copd. Following up rapid for hypoxia and SOB.  Continue BIPAP 14/8 for remainder of night  May consider high flow NC during day

## 2018-04-30 NOTE — PROGRESS NOTE ADULT - SUBJECTIVE AND OBJECTIVE BOX
[facility] - Daniella Kenyon, Nola, Ghanshyam, Leon, Sally Israel  Office Number:  [phone]    Had RRT last night for hypoxia.  Now on BIPAP    Telemetry:  Afib, 100-120    MEDICATIONS  (STANDING):  ALBUTerol    0.083% 2.5 milliGRAM(s) Nebulizer every 8 hours  apixaban 2.5 milliGRAM(s) Oral every 12 hours  buDESOnide   0.5 milliGRAM(s) Respule 0.5 milliGRAM(s) Inhalation two times a day  diltiazem    Tablet 60 milliGRAM(s) Oral four times a day  furosemide   Injectable 40 milliGRAM(s) IV Push two times a day  methylPREDNISolone sodium succinate Injectable 20 milliGRAM(s) IV Push daily  metoprolol tartrate 25 milliGRAM(s) Oral two times a day  pantoprazole   Suspension 40 milliGRAM(s) Oral before breakfast  piperacillin/tazobactam IVPB. 3.375 Gram(s) IV Intermittent every 8 hours  tiotropium 2.5 MICROgram(s)/olodaterol 2.5 MICROgram(s) Inhaler 2 Puff(s) Inhalation daily    MEDICATIONS  (PRN):  acetaminophen   Tablet. 650 milliGRAM(s) Oral every 6 hours PRN Mild Pain (1 - 3)  lidocaine   Patch 1 Patch Transdermal every 24 hours PRN right shoulder pain      Allergies    No Known Allergies          Vital Signs Last 24 Hrs  T(C): 36.3 (30 Apr 2018 05:01), Max: 36.6 (29 Apr 2018 16:53)  T(F): 97.4 (30 Apr 2018 05:01), Max: 97.8 (29 Apr 2018 16:53)  HR: 66 (30 Apr 2018 05:40) (66 - 100)  BP: 109/67 (30 Apr 2018 05:01) (109/67 - 134/74)  BP(mean): --  RR: 18 (30 Apr 2018 05:01) (17 - 24)  SpO2: 92% (30 Apr 2018 05:40) (86% - 99%)    I&O's Summary    29 Apr 2018 07:01  -  30 Apr 2018 07:00  --------------------------------------------------------  IN: 0 mL / OUT: 800 mL / NET: -800 mL        ON EXAM:    General: mild respiratory distress.  ON BIPAP  HEENT: Mucous membranes are dry, anicteric  Lungs: Labored breathing with coarse bs b/l  Cardiovascular: Irregular, tachycardia S1 and S2, no murmurs, rubs, or gallops  Gastrointestinal: Bowel Sounds present, soft, nontender.   Lymph: No peripheral edema. No lymphadenopathy.  Skin: No rashes or ulcers  Psych:  Anxious    LABS: All Labs Reviewed:                        11.8   21.7  )-----------( 322      ( 30 Apr 2018 06:15 )             37.0                         11.9   23.0  )-----------( 374      ( 29 Apr 2018 06:31 )             35.7                         11.0   14.9  )-----------( 290      ( 28 Apr 2018 04:36 )             30.5     30 Apr 2018 06:15    145    |  101    |  43     ----------------------------<  149    4.5     |  36     |  1.40   29 Apr 2018 06:31    142    |  97     |  33     ----------------------------<  188    3.2     |  33     |  1.10   28 Apr 2018 04:36    143    |  99     |  30     ----------------------------<  124    3.5     |  35     |  1.20     Ca    8.7        30 Apr 2018 06:15  Ca    8.5        29 Apr 2018 06:31  Ca    7.6        28 Apr 2018 04:36  Mg     2.1       29 Apr 2018 06:31            Blood Culture:

## 2018-04-30 NOTE — PROGRESS NOTE ADULT - ASSESSMENT
83F with PMHx of COPD not on home O2, HTN, HLD, Afib on Eliquis, CHF, multiple compression fractures and ascending penetrating aortic ulcer, admitted with COPD exacerbation, PNA, acute exacerbation of chf, zeke. Pt now s/p Rapid Response for acute hypoxemic respiratory failure, hypokalemia    -Pain management.  -Consider anxiolytics   -Rate control. Continue Cardizem/BB/apixaban. Monitor HD's  -Improved respirations with bipap. Continue Bipap support. Pt is DNR/DNI  -Nebs/steroids  -NPO on bipap. PPI  -Monitor UOP/lytes. Continue diureses. Replete K+  -Off abx, consider restarting. Monitor wbc/temp. F/U cultures  -DVT ppx  -Supportive care  -Had lengthy discussion with Pt's daughter regarding clinical status. Given pt's CODE status, no additional benefit of ICU admission. Daughter initially confused about that, but later came to understand why. Also explained that if the pt required pressors then ICU would benefit her. She understood and was in agreement. Pt remains DNR/DNI.

## 2018-04-30 NOTE — PROGRESS NOTE ADULT - ASSESSMENT
83F w/pmh of COPD not on home 02, HTN, HLD, Afib on Eliquis, CHF, multiple compression fractures and ascending penetrating aortic ulcer presenting with sob for 3-4 days, admitted for CHF exacerbation with underyling copd. Several Rapid Responses have been called because patient desaturates. Raid was also called because patient converted to afib with RVR. 83F w/pmh of COPD not on home 02, HTN, HLD, Afib on Eliquis, CHF, multiple compression fractures and ascending penetrating aortic ulcer presenting with sob for 3-4 days, admitted for CHF exacerbation with underyling copd. Several Rapid Responses have been called because patient desaturates. RRwas also called because patient converted to afib with RVR.

## 2018-04-30 NOTE — PROGRESS NOTE ADULT - PROBLEM SELECTOR PLAN 2
-ECHO 3/2016 showed moderate ischemic myopathy, moderate-severe MR, severe dilated left atrium, normal LV, EF 67%.  Repeat tte show EF 65%  C/W lasix and PO Kcl for hypokalemia  -Continue strict I and O

## 2018-04-30 NOTE — CHART NOTE - NSCHARTNOTEFT_GEN_A_CORE
Assessment: patient seen being evaluated by speech swallow rx dysphagia 3 soft thin liquid diet. patient was changed to NPO on BIPAP after rapid response. per RN meeting with family today awaiting decision on hospice per MD note. poor prognosis per MD note.     Factors impacting intake: [ ] none [ ] nausea  [ ] vomiting [ ] diarrhea [ ] constipation  [ ]chewing problems [ ] swallowing issues  [ ] other:     Diet Presciption: Diet, Dysphagia 3 Soft-Thin Liquids (04-26-18 @ 16:59)    Intake: NPO currently     Current Weight: 4/30 wt 88.1# same as admit stated weight    Pertinent Medications: MEDICATIONS  (STANDING):  ALBUTerol    0.083% 2.5 milliGRAM(s) Nebulizer every 8 hours  apixaban 2.5 milliGRAM(s) Oral every 12 hours  benzocaine 20% Spray 1 Spray(s) Topical four times a day  buDESOnide   0.5 milliGRAM(s) Respule 0.5 milliGRAM(s) Inhalation two times a day  diltiazem    Tablet 60 milliGRAM(s) Oral four times a day  furosemide   Injectable 40 milliGRAM(s) IV Push two times a day  methylPREDNISolone sodium succinate Injectable 20 milliGRAM(s) IV Push daily  metoprolol tartrate 25 milliGRAM(s) Oral two times a day  pantoprazole   Suspension 40 milliGRAM(s) Oral before breakfast  piperacillin/tazobactam IVPB. 3.375 Gram(s) IV Intermittent every 8 hours  tiotropium 2.5 MICROgram(s)/olodaterol 2.5 MICROgram(s) Inhaler 2 Puff(s) Inhalation daily      Pertinent Labs: 04-30 Na145 mmol/L Glu 149 mg/dL<H> K+ 4.5 mmol/L Cr  1.40 mg/dL<H> BUN 43 mg/dL<H> 04-29 [id number] 6.2 %<H>     CAPILLARY BLOOD GLUCOSE      POCT Blood Glucose.: 196 mg/dL (30 Apr 2018 01:42)    Skin: kvng 17 nopressure injury    Estimated Needs:   [x ] no change since previous assessment  [ ] recalculated:     Previous Nutrition Diagnosis:   [ ] Inadequate Energy Intake [ ]Inadequate Oral Intake [ ] Excessive Energy Intake   [ ] Underweight [ ] Increased Nutrient Needs [ ] Overweight/Obesity   [ ] Altered GI Function [ ] Unintended Weight Loss [ ] Food & Nutrition Related Knowledge Deficit [ x] Malnutrition     Nutrition Diagnosis is [x ] ongoing  [ ] resolved [ ] not applicable     New Nutrition Diagnosis: [x ] not applicable       Interventions:   Recommend  [ ] Change Diet To:  [ ] Nutrition Supplement  [ ] Nutrition Support  [ x] Other: continue diet as per speech rx with ensure clear supplement daily    Monitoring and Evaluation:   [x ] PO intake [ x ] Tolerance to diet prescription [ x ] weights [ x ] labs[ x ] follow up per protocol  [x ] other: follow diet tolerance and family decision on further care plan. Assessment: patient seen being evaluated by speech swallow rx dysphagia 3 soft thin liquid diet. patient was changed to NPO on BIPAP after rapid response. per RN meeting with family today awaiting decision on hospice per MD note. poor prognosis per MD note. per speech therapist and RN possible swallowing issue secondary to thrush.     Factors impacting intake: [ ] none [ ] nausea  [ ] vomiting [ ] diarrhea [ ] constipation  [ ]chewing problems [ ] swallowing issues  [ ] other:     Diet Presciption: Diet, Dysphagia 3 Soft-Thin Liquids (04-26-18 @ 16:59)    Intake: NPO currently     Current Weight: 4/30 wt 88.1# same as admit stated weight    Pertinent Medications: MEDICATIONS  (STANDING):  ALBUTerol    0.083% 2.5 milliGRAM(s) Nebulizer every 8 hours  apixaban 2.5 milliGRAM(s) Oral every 12 hours  benzocaine 20% Spray 1 Spray(s) Topical four times a day  buDESOnide   0.5 milliGRAM(s) Respule 0.5 milliGRAM(s) Inhalation two times a day  diltiazem    Tablet 60 milliGRAM(s) Oral four times a day  furosemide   Injectable 40 milliGRAM(s) IV Push two times a day  methylPREDNISolone sodium succinate Injectable 20 milliGRAM(s) IV Push daily  metoprolol tartrate 25 milliGRAM(s) Oral two times a day  pantoprazole   Suspension 40 milliGRAM(s) Oral before breakfast  piperacillin/tazobactam IVPB. 3.375 Gram(s) IV Intermittent every 8 hours  tiotropium 2.5 MICROgram(s)/olodaterol 2.5 MICROgram(s) Inhaler 2 Puff(s) Inhalation daily      Pertinent Labs: 04-30 Na145 mmol/L Glu 149 mg/dL<H> K+ 4.5 mmol/L Cr  1.40 mg/dL<H> BUN 43 mg/dL<H> 04-29 [id number] 6.2 %<H>     CAPILLARY BLOOD GLUCOSE      POCT Blood Glucose.: 196 mg/dL (30 Apr 2018 01:42)    Skin: kvng 17 no pressure injury    Estimated Needs:   [x ] no change since previous assessment  [ ] recalculated:     Previous Nutrition Diagnosis:   [ ] Inadequate Energy Intake [ ]Inadequate Oral Intake [ ] Excessive Energy Intake   [ ] Underweight [ ] Increased Nutrient Needs [ ] Overweight/Obesity   [ ] Altered GI Function [ ] Unintended Weight Loss [ ] Food & Nutrition Related Knowledge Deficit [ x] Malnutrition     Nutrition Diagnosis is [x ] ongoing  [ ] resolved [ ] not applicable     New Nutrition Diagnosis: [x ] not applicable       Interventions:   Recommend  [ ] Change Diet To:  [ ] Nutrition Supplement  [ ] Nutrition Support  [ x] Other: continue diet as per speech rx with ensure clear supplement daily    Monitoring and Evaluation:   [x ] PO intake [ x ] Tolerance to diet prescription [ x ] weights [ x ] labs[ x ] follow up per protocol  [x ] other: follow diet tolerance and family decision on further care plan. follow for possible addition of treatment for thrush.

## 2018-04-30 NOTE — PROGRESS NOTE ADULT - PROBLEM SELECTOR PLAN 1
-Patient continues to desaturate likely secondary to fluid overload, chronic aspiration, and COPD, is currently on high flow oxygen.  C/W Spiriva and duonebs, steroids and antibx.  received morphine   -Speech and Swallow evaluation recommends dysphagia 3 with thin liquids.  Prognosis guarded and daughter is not ready for hospice care consult.  -PT recommends subacute rehab -Patient continues to desaturate likely secondary to fluid overload, chronic aspiration, and COPD, was placed on BIPAP after RR was called for hypoxia  C/W Spiriva and duonebs, steroids and antibx.  received morphine   -Reevaluation from Speech and Swallow pending  -Prognosis guarded,  family wishes to have family meeting and discuss goals of care in more detail before deciding on hospice    -PT recommends subacute rehab -Patient continues to desaturate likely secondary to fluid overload, chronic aspiration, and COPD, was placed on BIPAP after RR was called for hypoxia  C/W Spiriva and duonebs, steroids and antibx.  received morphine   -Reevaluation from Speech and Swallow pending  -Prognosis guarded,  family wishes to have family meeting and discuss goals of care in more detail before deciding on hospice  -PT recommends subacute rehab -Patient continues to desaturate likely secondary to fluid overload, chronic aspiration, and COPD, was placed on BIPAP after RR was called for hypoxia  C/W Spiriva and duonebs, steroids and antibx. HF O2. Pt refusing for BIPAP.  received morphine   -Reevaluation from Speech and Swallow pending  -Prognosis guarded,  family wishes to have family meeting and discuss goals of care in more detail before deciding on hospice  -PT recommends subacute rehab

## 2018-04-30 NOTE — PROGRESS NOTE ADULT - PROBLEM SELECTOR PROBLEM 1
Respiratory distress
Acute respiratory failure with hypoxia
Acute systolic congestive heart failure
Respiratory distress
Respiratory distress
Acute respiratory failure with hypoxia
Acute respiratory failure with hypoxia
Acute systolic congestive heart failure
Acute respiratory failure with hypoxia
Acute respiratory failure with hypoxia

## 2018-04-30 NOTE — PROGRESS NOTE ADULT - PROBLEM SELECTOR PROBLEM 2
Acute systolic congestive heart failure
Acute systolic congestive heart failure
Pneumonia
Acute systolic congestive heart failure
Pneumonia
Acute systolic congestive heart failure
Acute systolic congestive heart failure

## 2018-04-30 NOTE — PROGRESS NOTE ADULT - ASSESSMENT
83F w/pmh of COPD not on home 02, HTN, HLD, Afib on Eliquis, CHF, multiple compression fractures and ascending penetrating aortic ulcer presenting with sob.    Likely multifactorial 2/2 CHF exacerbation vs. COPD vs. aspiration pneumonia.  No clear evidence of acute ischemia,    - Now on BIPAP after an RRT for hypoxia.  Maybe getting too anxious. Anxiolytic would be helpful.    - Follow Pulm recs.  Continue bronchodilators and steroids  - She has been diuresed, and her creatinine is now trending up and she has DENISE.  I would stop her IV Lasix for now and follow her creatinine.    - Please continue to maintain strict I/Os, monitor daily weights, Cr, and K.   - No acute ischemia  - Continue Lopressor and Cardizem. BP soft per flowsheet but has been receiving her doses  - ECHO 3/2016 showed moderate ischemic myopathy, moderate-severe MR, severe dilated left atrium, normal LV, EF 67%.  Repeat tte from yesterday is similar   - Pt has hx of chronic afib on Eliquis.  CHADsVASc score 5.  H& H remains stable Continue Eliquis 2.5 mg BID   - Monitor and replete lytes, keep K>4, Mg>2  - Cardiovascular workup will depend on clinical course, all other workup per primary team  - Formal speech and swallow pending  - Will continue to follow-up   - Patient is a DNR. Overall prognosis is poor. [facility] meeting later today    Madiha Da Silva NP-C  Cardiology

## 2018-04-30 NOTE — PROGRESS NOTE ADULT - PROBLEM SELECTOR PLAN 4
-Suspected aspiration pneumonia, ABG consistent with hypoxia, mild leukocytosis  -CT revealed small right greater than left pleural effusions. Scattered patchy right upper lobe pulmonary infiltrates new from the prior study.  -Patient has been desaturating, Procalcitonin elevated  -On Zosyn day 5 (Started 4/25)

## 2018-04-30 NOTE — PROGRESS NOTE ADULT - PROBLEM SELECTOR PLAN 1
emphysema, copd, resp distress, pulm HTN and diastolic HF,   oral and skin care  I and O  BIPAP and alternate with High Flow NC  supportive regimen and care  NEBS and Solumedrol and Pulmicort  on LASIX  on AC - Eliquis  overall prognosis is very POOR  pt is DNR DNI  daughter and rest of family are ambivalent about future direction of care, I discussed with them options, Hospice, cont current regimen and or SNF with comfort measures  they wish to have a family meeting and discuss goals of care in more detail  pt has been in the hospital for > 9 days and is not showing signs of improvement

## 2018-04-30 NOTE — PROGRESS NOTE ADULT - SUBJECTIVE AND OBJECTIVE BOX
Date/Time Patient Seen:  		  Referring MD:   Data Reviewed	       Patient is a 83y old  Female who presents with a chief complaint of shortness of breath (2018 14:05)  in bed  seen and examined  vs and meds reviewed  on BIPAP  overnight events noted  daughter at the BS      Subjective/HPI     PAST MEDICAL & SURGICAL HISTORY:  CHF (congestive heart failure)  Compression fracture: Aug 2015  Carbon monoxide poisonin  Pulmonary emphysema, unspecified emphysema type  Secondary hypertension  Atrial fibrillation, unspecified type  History of arthroscopy of knee  History of appendectomy:   History of right shoulder replacement:   S/P appy        Medication list         MEDICATIONS  (STANDING):  ALBUTerol    0.083% 2.5 milliGRAM(s) Nebulizer every 8 hours  apixaban 2.5 milliGRAM(s) Oral every 12 hours  buDESOnide   0.5 milliGRAM(s) Respule 0.5 milliGRAM(s) Inhalation two times a day  diltiazem    Tablet 60 milliGRAM(s) Oral four times a day  furosemide   Injectable 40 milliGRAM(s) IV Push two times a day  methylPREDNISolone sodium succinate Injectable 20 milliGRAM(s) IV Push daily  metoprolol tartrate 25 milliGRAM(s) Oral two times a day  pantoprazole   Suspension 40 milliGRAM(s) Oral before breakfast  piperacillin/tazobactam IVPB. 3.375 Gram(s) IV Intermittent every 8 hours  tiotropium 2.5 MICROgram(s)/olodaterol 2.5 MICROgram(s) Inhaler 2 Puff(s) Inhalation daily    MEDICATIONS  (PRN):  acetaminophen   Tablet. 650 milliGRAM(s) Oral every 6 hours PRN Mild Pain (1 - 3)  lidocaine   Patch 1 Patch Transdermal every 24 hours PRN right shoulder pain         Vitals log        ICU Vital Signs Last 24 Hrs  T(C): 36.3 (2018 05:01), Max: 36.6 (2018 16:53)  T(F): 97.4 (2018 05:01), Max: 97.8 (2018 16:53)  HR: 66 (2018 05:40) (66 - 100)  BP: 109/67 (2018 05:01) (109/67 - 134/74)  BP(mean): --  ABP: --  ABP(mean): --  RR: 18 (2018 05:01) (17 - 24)  SpO2: 92% (2018 05:40) (86% - 99%)           Input and Output:  I&O's Detail    2018 07:  -  2018 07:00  --------------------------------------------------------  IN:  Total IN: 0 mL    OUT:    Voided: 550 mL  Total OUT: 550 mL    Total NET: -550 mL      2018 07:  -  2018 06:32  --------------------------------------------------------  IN:  Total IN: 0 mL    OUT:    Voided: 800 mL  Total OUT: 800 mL    Total NET: -800 mL          Lab Data                        11.9   23.0  )-----------( 374      ( 2018 06:31 )             35.7     04-29    142  |  97  |  33<H>  ----------------------------<  188<H>  3.2<L>   |  33<H>  |  1.10    Ca    8.5      2018 06:31  Mg     2.1     04-29              Review of Systems	      Objective     Physical Examination    head at  heart s1s2  lung dec BS  abd soft      Pertinent Lab findings & Imaging      Elpidio:  NO   Adequate UO     I&O's Detail    2018 07:  -  2018 07:00  --------------------------------------------------------  IN:  Total IN: 0 mL    OUT:    Voided: 550 mL  Total OUT: 550 mL    Total NET: -550 mL      2018 07:  -  2018 06:32  --------------------------------------------------------  IN:  Total IN: 0 mL    OUT:    Voided: 800 mL  Total OUT: 800 mL    Total NET: -800 mL               Discussed with:     Cultures:	        Radiology

## 2018-04-30 NOTE — PROGRESS NOTE ADULT - NSHPATTENDINGPLANDISCUSS_GEN_ALL_CORE
daughter, Pt, and RN
daughter, Pt, and RN
pt, RN, SW, CM, residency team
Family, SW, Resident and palliative care.
pt, RN, SW, CM, residency team

## 2018-04-30 NOTE — PROGRESS NOTE ADULT - PROVIDER SPECIALTY LIST ADULT
Cardiology
Critical Care
Critical Care
Hospitalist
Pulmonology
Hospitalist
Cardiology
Hospitalist
Pulmonology
Hospitalist

## 2018-04-30 NOTE — PROGRESS NOTE ADULT - SUBJECTIVE AND OBJECTIVE BOX
83F w/pmh of COPD not on home 02, HTN, HLD, Afib on Eliquis, CHF, multiple compression fractures and ascending penetrating aortic ulcer presenting with sob for 3-4 days, admitted for CHF exacerbation with underyling copd. rAPID Rapid response called because patient desat. to 60s and was in afib with RVR.    INTERVAL HPI: 83F w/pmh of COPD not on home 02, HTN, HLD, Afib on Eliquis, CHF, multiple compression fractures and ascending penetrating aortic ulcer presenting with sob for 3-4 days. States that she has been having increasing sob for the past few days. States that she also has nausea and regurgitated clear fluids around 3-4 days ago. Pt has felt increasing weakness and lethargy with decreased appetite as well. She has tried nebulizers and inhalers for her sob without relief of her symptoms. Patient is unable to lie down flat due to compression fractures and difficulty breathing. Denies headache, fever, chills, vomiting, cough, chest pain, palpitations, peripheral edema and abdominal pain. No sick contacts. No recent travel. She ambulates with a cane. Patient has a history of spasmodic dysphonia which she was previously getting botox shots for but had an episode of aspiration so she stopped the injections.    In ED, patient was hypotensive with BP of 88/53, improved to 106/60. Remainder of vitals were within normal range. CBC  unremarkable, BMP significant for hyponatremia. ProBNP was 25,370. RVP negative. CT chest ordered and pending. Preliminary read of EKG revealed afib with ventricular rate of 85. CXR showed small right pleural effusion, questionable infiltrate. CT chest revealed small right greater than left pleural effusions. Scattered patchy right upper lobe pulmonary infiltrates new from the prior study. Emphysema. Stable peripherally calcified lesion in the spleen. Patient given Rocephin and zithromax in ED. (2018 17:33)      OVERNIGHT EVENTS: Rapid Response was called earlier this morning because patient desatted to 80s while on on high flow. Patient stated she felt nauseous and short of breath. She was placed on nonrebreather and given Zofran. O2 sat only improved to 87% on nonrebreather and patient was placed on BIPAP. Patient seen and examined at bedside.     MEDICATIONS  (STANDING):  ALBUTerol    0.083% 2.5 milliGRAM(s) Nebulizer every 8 hours  apixaban 2.5 milliGRAM(s) Oral every 12 hours  buDESOnide   0.5 milliGRAM(s) Respule 0.5 milliGRAM(s) Inhalation two times a day  diltiazem    Tablet 60 milliGRAM(s) Oral four times a day  furosemide   Injectable 40 milliGRAM(s) IV Push two times a day  methylPREDNISolone sodium succinate Injectable 20 milliGRAM(s) IV Push daily  metoprolol tartrate 25 milliGRAM(s) Oral two times a day  pantoprazole   Suspension 40 milliGRAM(s) Oral before breakfast  piperacillin/tazobactam IVPB. 3.375 Gram(s) IV Intermittent every 8 hours  tiotropium 2.5 MICROgram(s)/olodaterol 2.5 MICROgram(s) Inhaler 2 Puff(s) Inhalation daily    MEDICATIONS  (PRN):  acetaminophen   Tablet. 650 milliGRAM(s) Oral every 6 hours PRN Mild Pain (1 - 3)  lidocaine   Patch 1 Patch Transdermal every 24 hours PRN right shoulder pain    Home Medications:  calcitonin nasal 200 intl units/inh spray: 1 spray(s) nasal in one nostril everyday, alternating each nostril.  (2018 21:25)  Eliquis 2.5 mg oral tablet: 1 tab(s) orally 2 times a day (2018 21:25)  furosemide 20 mg oral tablet: 1 tab(s) orally 2 times a day (2018 21:25)  losartan 50 mg oral tablet: 1 tab(s) orally once a day (2018 16:47)  metoprolol succinate 100 mg oral tablet, extended release: 1 tab(s) orally once a day (2018 21:25)  omeprazole 20 mg oral delayed release capsule: 1 cap(s) orally 2 times a day (2018 21:25)  Praluent Pen 75 mg/mL subcutaneous solution: subcutaneous 2 times a month (2018 21:25)  ProAir HFA 90 mcg/inh inhalation aerosol: Inhale 1 to 2 puffs every 4 to 6 hours as needed (2018 21:25)  Stiolto Respimat 2.5 mcg-2.5 mcg/inh inhalation aerosol: 2 puff(s) inhaled every 24 hours (2018 21:25)    PAST MEDICAL & SURGICAL HISTORY:  CHF (congestive heart failure)  Compression fracture: Aug 2015  Carbon monoxide poisonin  Pulmonary emphysema, unspecified emphysema type  Secondary hypertension  Atrial fibrillation, unspecified type  History of arthroscopy of knee  History of appendectomy:   History of right shoulder replacement:     Vital Signs Last 24 Hrs  T(C): 36.3 (2018 05:01), Max: 36.6 (2018 16:53)  T(F): 97.4 (2018 05:01), Max: 97.8 (2018 16:53)  HR: 66 (2018 05:40) (66 - 100)  BP: 109/67 (2018 05:01) (109/67 - 134/74)  RR: 18 (2018 05:01) (17 - 24)  SpO2: 92% (2018 05:40) (86% - 99%)    REVIEW OF SYSTEM:    PHYSICAL EXAM:        LABS:                        11.9   23.0  )-----------( 374      ( 2018 06:31 )             35.7     2018 06:31    142    |  97     |  33     ----------------------------<  188    3.2     |  33     |  1.10     Ca    8.5        2018 06:31  Mg     2.1       2018 06:31          CAPILLARY BLOOD GLUCOSE        UCx       RADIOLOGY & ADDITIONAL TESTS: 83F w/pmh of COPD not on home 02, HTN, HLD, Afib on Eliquis, CHF, multiple compression fractures and ascending penetrating aortic ulcer presenting with sob for 3-4 days, admitted for CHF exacerbation with underyling copd. rAPID Rapid response called because patient desat. to 60s and was in afib with RVR.    INTERVAL HPI: 83F w/pmh of COPD not on home 02, HTN, HLD, Afib on Eliquis, CHF, multiple compression fractures and ascending penetrating aortic ulcer presenting with sob for 3-4 days. States that she has been having increasing sob for the past few days. States that she also has nausea and regurgitated clear fluids around 3-4 days ago. Pt has felt increasing weakness and lethargy with decreased appetite as well. She has tried nebulizers and inhalers for her sob without relief of her symptoms. Patient is unable to lie down flat due to compression fractures and difficulty breathing. Denies headache, fever, chills, vomiting, cough, chest pain, palpitations, peripheral edema and abdominal pain. No sick contacts. No recent travel. She ambulates with a cane. Patient has a history of spasmodic dysphonia which she was previously getting botox shots for but had an episode of aspiration so she stopped the injections.    In ED, patient was hypotensive with BP of 88/53, improved to 106/60. Remainder of vitals were within normal range. CBC  unremarkable, BMP significant for hyponatremia. ProBNP was 25,370. RVP negative. CT chest ordered and pending. Preliminary read of EKG revealed afib with ventricular rate of 85. CXR showed small right pleural effusion, questionable infiltrate. CT chest revealed small right greater than left pleural effusions. Scattered patchy right upper lobe pulmonary infiltrates new from the prior study. Emphysema. Stable peripherally calcified lesion in the spleen. Patient given Rocephin and zithromax in ED. (2018 17:33)      OVERNIGHT EVENTS: Rapid Response was called earlier this morning because patient desatted to 80s while on on high flow. Patient stated she felt nauseous and short of breath. She was placed on nonrebreather and given Zofran. O2 sat only improved to 87% on nonrebreather and patient was placed on BIPAP. Patient seen and examined at bedside. Patient     MEDICATIONS  (STANDING):  ALBUTerol    0.083% 2.5 milliGRAM(s) Nebulizer every 8 hours  apixaban 2.5 milliGRAM(s) Oral every 12 hours  buDESOnide   0.5 milliGRAM(s) Respule 0.5 milliGRAM(s) Inhalation two times a day  diltiazem    Tablet 60 milliGRAM(s) Oral four times a day  furosemide   Injectable 40 milliGRAM(s) IV Push two times a day  methylPREDNISolone sodium succinate Injectable 20 milliGRAM(s) IV Push daily  metoprolol tartrate 25 milliGRAM(s) Oral two times a day  pantoprazole   Suspension 40 milliGRAM(s) Oral before breakfast  piperacillin/tazobactam IVPB. 3.375 Gram(s) IV Intermittent every 8 hours  tiotropium 2.5 MICROgram(s)/olodaterol 2.5 MICROgram(s) Inhaler 2 Puff(s) Inhalation daily    MEDICATIONS  (PRN):  acetaminophen   Tablet. 650 milliGRAM(s) Oral every 6 hours PRN Mild Pain (1 - 3)  lidocaine   Patch 1 Patch Transdermal every 24 hours PRN right shoulder pain    Home Medications:  calcitonin nasal 200 intl units/inh spray: 1 spray(s) nasal in one nostril everyday, alternating each nostril.  (2018 21:25)  Eliquis 2.5 mg oral tablet: 1 tab(s) orally 2 times a day (2018 21:25)  furosemide 20 mg oral tablet: 1 tab(s) orally 2 times a day (2018 21:25)  losartan 50 mg oral tablet: 1 tab(s) orally once a day (2018 16:47)  metoprolol succinate 100 mg oral tablet, extended release: 1 tab(s) orally once a day (2018 21:25)  omeprazole 20 mg oral delayed release capsule: 1 cap(s) orally 2 times a day (2018 21:25)  Praluent Pen 75 mg/mL subcutaneous solution: subcutaneous 2 times a month (2018 21:25)  ProAir HFA 90 mcg/inh inhalation aerosol: Inhale 1 to 2 puffs every 4 to 6 hours as needed (2018 21:25)  Stiolto Respimat 2.5 mcg-2.5 mcg/inh inhalation aerosol: 2 puff(s) inhaled every 24 hours (2018 21:25)    PAST MEDICAL & SURGICAL HISTORY:  CHF (congestive heart failure)  Compression fracture: Aug 2015  Carbon monoxide poisonin  Pulmonary emphysema, unspecified emphysema type  Secondary hypertension  Atrial fibrillation, unspecified type  History of arthroscopy of knee  History of appendectomy:   History of right shoulder replacement:     Vital Signs Last 24 Hrs  T(C): 36.3 (2018 05:01), Max: 36.6 (2018 16:53)  T(F): 97.4 (2018 05:01), Max: 97.8 (2018 16:53)  HR: 66 (2018 05:40) (66 - 100)  BP: 109/67 (2018 05:01) (109/67 - 134/74)  RR: 18 (2018 05:01) (17 - 24)  SpO2: 92% (2018 05:40) (86% - 99%)    REVIEW OF SYSTEM:      PHYSICAL EXAM:        LABS:                        11.9   23.0  )-----------( 374      ( 2018 06:31 )             35.7     2018 06:31    142    |  97     |  33     ----------------------------<  188    3.2     |  33     |  1.10     Ca    8.5        2018 06:31  Mg     2.1       2018 06:31          CAPILLARY BLOOD GLUCOSE        UCx       RADIOLOGY & ADDITIONAL TESTS: 83F w/pmh of COPD not on home 02, HTN, HLD, Afib on Eliquis, CHF, multiple compression fractures and ascending penetrating aortic ulcer presenting with sob for 3-4 days, admitted for CHF exacerbation with underyling copd. rAPID Rapid response called because patient desat. to 60s and was in afib with RVR.    INTERVAL HPI: 83F w/pmh of COPD not on home 02, HTN, HLD, Afib on Eliquis, CHF, multiple compression fractures and ascending penetrating aortic ulcer presenting with sob for 3-4 days. States that she has been having increasing sob for the past few days. States that she also has nausea and regurgitated clear fluids around 3-4 days ago. Pt has felt increasing weakness and lethargy with decreased appetite as well. She has tried nebulizers and inhalers for her sob without relief of her symptoms. Patient is unable to lie down flat due to compression fractures and difficulty breathing. Denies headache, fever, chills, vomiting, cough, chest pain, palpitations, peripheral edema and abdominal pain. No sick contacts. No recent travel. She ambulates with a cane. Patient has a history of spasmodic dysphonia which she was previously getting botox shots for but had an episode of aspiration so she stopped the injections.    In ED, patient was hypotensive with BP of 88/53, improved to 106/60. Remainder of vitals were within normal range. CBC  unremarkable, BMP significant for hyponatremia. ProBNP was 25,370. RVP negative. CT chest ordered and pending. Preliminary read of EKG revealed afib with ventricular rate of 85. CXR showed small right pleural effusion, questionable infiltrate. CT chest revealed small right greater than left pleural effusions. Scattered patchy right upper lobe pulmonary infiltrates new from the prior study. Emphysema. Stable peripherally calcified lesion in the spleen. Patient given Rocephin and zithromax in ED. (2018 17:33)      OVERNIGHT EVENTS: Rapid Response was called earlier this morning because patient desatted to 80s while on on high flow. Patient stated she felt nauseous and short of breath. She was placed on nonrebreather and given Zofran. O2 sat only improved to 87% on nonrebreather and patient was placed on BIPAP. Patient seen and examined at bedside. Patient says the breathing is better but she has a sore throat. Patient also complains that she does not want to be on BIPAP.    MEDICATIONS  (STANDING):  ALBUTerol    0.083% 2.5 milliGRAM(s) Nebulizer every 8 hours  apixaban 2.5 milliGRAM(s) Oral every 12 hours  buDESOnide   0.5 milliGRAM(s) Respule 0.5 milliGRAM(s) Inhalation two times a day  diltiazem    Tablet 60 milliGRAM(s) Oral four times a day  furosemide   Injectable 40 milliGRAM(s) IV Push two times a day  methylPREDNISolone sodium succinate Injectable 20 milliGRAM(s) IV Push daily  metoprolol tartrate 25 milliGRAM(s) Oral two times a day  pantoprazole   Suspension 40 milliGRAM(s) Oral before breakfast  piperacillin/tazobactam IVPB. 3.375 Gram(s) IV Intermittent every 8 hours  tiotropium 2.5 MICROgram(s)/olodaterol 2.5 MICROgram(s) Inhaler 2 Puff(s) Inhalation daily    MEDICATIONS  (PRN):  acetaminophen   Tablet. 650 milliGRAM(s) Oral every 6 hours PRN Mild Pain (1 - 3)  lidocaine   Patch 1 Patch Transdermal every 24 hours PRN right shoulder pain    Home Medications:  calcitonin nasal 200 intl units/inh spray: 1 spray(s) nasal in one nostril everyday, alternating each nostril.  (2018 21:25)  Eliquis 2.5 mg oral tablet: 1 tab(s) orally 2 times a day (2018 21:25)  furosemide 20 mg oral tablet: 1 tab(s) orally 2 times a day (2018 21:25)  losartan 50 mg oral tablet: 1 tab(s) orally once a day (2018 16:47)  metoprolol succinate 100 mg oral tablet, extended release: 1 tab(s) orally once a day (2018 21:25)  omeprazole 20 mg oral delayed release capsule: 1 cap(s) orally 2 times a day (2018 21:25)  Praluent Pen 75 mg/mL subcutaneous solution: subcutaneous 2 times a month (2018 21:25)  ProAir HFA 90 mcg/inh inhalation aerosol: Inhale 1 to 2 puffs every 4 to 6 hours as needed (2018 21:25)  Stiolto Respimat 2.5 mcg-2.5 mcg/inh inhalation aerosol: 2 puff(s) inhaled every 24 hours (2018 21:25)    PAST MEDICAL & SURGICAL HISTORY:  CHF (congestive heart failure)  Compression fracture: Aug 2015  Carbon monoxide poisonin  Pulmonary emphysema, unspecified emphysema type  Secondary hypertension  Atrial fibrillation, unspecified type  History of arthroscopy of knee  History of appendectomy:   History of right shoulder replacement:     Vital Signs Last 24 Hrs  T(C): 36.3 (2018 05:01), Max: 36.6 (2018 16:53)  T(F): 97.4 (2018 05:01), Max: 97.8 (2018 16:53)  HR: 66 (2018 05:40) (66 - 100)  BP: 109/67 (2018 05:01) (109/67 - 134/74)  RR: 18 (2018 05:01) (17 - 24)  SpO2: 92% (2018 05:40) (86% - 99%)    REVIEW OF SYSTEM:  CONSTITUTIONAL: admits generalized weakness, no fevers or chills  EYES/ENT: No visual changes;  No vertigo or throat pain   NECK: admits neck discomfort, denies stiffness  RESPIRATORY: admits to shortness of breath this morning, but denies SOB on examination.  CARDIOVASCULAR: admits to SOB  GASTROINTESTINAL: No abdominal pain. No nausea, vomiting, or hematemesis; No diarrhea or constipation. No melena or hematochezia.  GENITOURINARY: No dysuria, frequency or hematuria  NEUROLOGICAL: No numbness or weakness  MSK: Back pain    PHYSICAL EXAM:  General: in no respiratory distress on high flow oxygen  HEENT: NCAT, PERRLA, EOMI bl, moist mucous membranes   Neck: Supple, nontender, no mass  Neurology: A&Ox4, nonfocal, CN II-XII grossly intact, sensation intact  Respiratory: Decreased breath sounds in bilateral bases, no increase work of breathing, on High flow oxygen  CV: tachycardic, irregular rhythm. No murmurs  Abdominal: Soft, NT, ND +BSx4  Extremities: No C/C/E, + peripheral pulses  MSK: Normal ROM, no joint erythema or warmth, no joint swelling   Skin: warm, dry, normal color, no rash or abnormal lesions      LABS:                                      11.8   21.7  )-----------( 322      ( 2018 06:15 )             37.0     04-30    145  |  101  |  43<H>  ----------------------------<  149<H>  4.5   |  36<H>  |  1.40<H>    Ca    8.7      2018 06:15  Mg     2.1     04-29 83F w/pmh of COPD not on home 02, HTN, HLD, Afib on Eliquis, CHF, multiple compression fractures and ascending penetrating aortic ulcer presenting with sob for 3-4 days, admitted for CHF exacerbation with underyling copd. rAPID Rapid response called because patient desat. to 60s and was in afib with RVR.    INTERVAL HPI: 83F w/pmh of COPD not on home 02, HTN, HLD, Afib on Eliquis, CHF, multiple compression fractures and ascending penetrating aortic ulcer presenting with sob for 3-4 days. States that she has been having increasing sob for the past few days. States that she also has nausea and regurgitated clear fluids around 3-4 days ago. Pt has felt increasing weakness and lethargy with decreased appetite as well. She has tried nebulizers and inhalers for her sob without relief of her symptoms. Patient is unable to lie down flat due to compression fractures and difficulty breathing. Denies headache, fever, chills, vomiting, cough, chest pain, palpitations, peripheral edema and abdominal pain. No sick contacts. No recent travel. She ambulates with a cane. Patient has a history of spasmodic dysphonia which she was previously getting botox shots for but had an episode of aspiration so she stopped the injections.    OVERNIGHT EVENTS: Rapid Response was called earlier this morning because patient desatted to 80s while on on high flow. Patient stated she felt nauseous and short of breath. She was placed on nonrebreather and given Zofran. O2 sat only improved to 87% on nonrebreather and patient was placed on BIPAP. Patient seen and examined at bedside. Patient says the breathing is better but she has a sore throat. Patient also complains that she does not want to be on BIPAP.    MEDICATIONS  (STANDING):  ALBUTerol    0.083% 2.5 milliGRAM(s) Nebulizer every 8 hours  apixaban 2.5 milliGRAM(s) Oral every 12 hours  buDESOnide   0.5 milliGRAM(s) Respule 0.5 milliGRAM(s) Inhalation two times a day  diltiazem    Tablet 60 milliGRAM(s) Oral four times a day  furosemide   Injectable 40 milliGRAM(s) IV Push two times a day  methylPREDNISolone sodium succinate Injectable 20 milliGRAM(s) IV Push daily  metoprolol tartrate 25 milliGRAM(s) Oral two times a day  pantoprazole   Suspension 40 milliGRAM(s) Oral before breakfast  piperacillin/tazobactam IVPB. 3.375 Gram(s) IV Intermittent every 8 hours  tiotropium 2.5 MICROgram(s)/olodaterol 2.5 MICROgram(s) Inhaler 2 Puff(s) Inhalation daily    MEDICATIONS  (PRN):  acetaminophen   Tablet. 650 milliGRAM(s) Oral every 6 hours PRN Mild Pain (1 - 3)  lidocaine   Patch 1 Patch Transdermal every 24 hours PRN right shoulder pain    Vital Signs Last 24 Hrs  T(C): 36.3 (30 Apr 2018 05:01), Max: 36.6 (29 Apr 2018 16:53)  T(F): 97.4 (30 Apr 2018 05:01), Max: 97.8 (29 Apr 2018 16:53)  HR: 66 (30 Apr 2018 05:40) (66 - 100)  BP: 109/67 (30 Apr 2018 05:01) (109/67 - 134/74)  RR: 18 (30 Apr 2018 05:01) (17 - 24)  SpO2: 92% (30 Apr 2018 05:40) (86% - 99%)    REVIEW OF SYSTEM:  CONSTITUTIONAL: admits generalized weakness, no fevers or chills  EYES/ENT: No visual changes;  No vertigo or throat pain   NECK: admits neck discomfort, denies stiffness  RESPIRATORY: admits to shortness of breath this morning, but denies SOB on examination.  CARDIOVASCULAR: admits to SOB  GASTROINTESTINAL: No abdominal pain. No nausea, vomiting, or hematemesis; No diarrhea or constipation. No melena or hematochezia.  GENITOURINARY: No dysuria, frequency or hematuria  NEUROLOGICAL: No numbness or weakness  MSK: Back pain    PHYSICAL EXAM:  General: in no respiratory distress on high flow oxygen  HEENT: NCAT, PERRLA, EOMI bl, moist mucous membranes   Neck: Supple, nontender, no mass  Neurology: A&Ox4, nonfocal, CN II-XII grossly intact, sensation intact  Respiratory: Decreased breath sounds in bilateral bases, no increase work of breathing, on High flow oxygen  CV: tachycardic, irregular rhythm. No murmurs  Abdominal: Soft, NT, ND +BSx4  Extremities: No C/C/E, + peripheral pulses  MSK: Normal ROM, no joint erythema or warmth, no joint swelling   Skin: warm, dry, normal color, no rash or abnormal lesions    LABS:                                      11.8   21.7  )-----------( 322      ( 30 Apr 2018 06:15 )             37.0     04-30    145  |  101  |  43<H>  ----------------------------<  149<H>  4.5   |  36<H>  |  1.40<H>    Ca    8.7      30 Apr 2018 06:15  Mg     2.1     04-29 83F w/pmh of COPD not on home 02, HTN, HLD, Afib on Eliquis, CHF, multiple compression fractures and ascending penetrating aortic ulcer presenting with sob for 3-4 days, admitted for CHF exacerbation with underyling copd. rAPID Rapid response called because patient desat. to 60s and was in afib with RVR.    OVERNIGHT EVENTS: Rapid Response was called earlier this morning because patient desatted to 80s while on on high flow. Patient stated she felt nauseous and short of breath. She was placed on nonrebreather and given Zofran. O2 sat only improved to 87% on nonrebreather and patient was placed on BIPAP. Patient seen and examined at bedside. Patient says the breathing is better but she has a sore throat. Patient also complains that she does not want to be on BIPAP.    MEDICATIONS  (STANDING):  ALBUTerol    0.083% 2.5 milliGRAM(s) Nebulizer every 8 hours  apixaban 2.5 milliGRAM(s) Oral every 12 hours  buDESOnide   0.5 milliGRAM(s) Respule 0.5 milliGRAM(s) Inhalation two times a day  diltiazem    Tablet 60 milliGRAM(s) Oral four times a day  furosemide   Injectable 40 milliGRAM(s) IV Push two times a day  methylPREDNISolone sodium succinate Injectable 20 milliGRAM(s) IV Push daily  metoprolol tartrate 25 milliGRAM(s) Oral two times a day  pantoprazole   Suspension 40 milliGRAM(s) Oral before breakfast  piperacillin/tazobactam IVPB. 3.375 Gram(s) IV Intermittent every 8 hours  tiotropium 2.5 MICROgram(s)/olodaterol 2.5 MICROgram(s) Inhaler 2 Puff(s) Inhalation daily    MEDICATIONS  (PRN):  acetaminophen   Tablet. 650 milliGRAM(s) Oral every 6 hours PRN Mild Pain (1 - 3)  lidocaine   Patch 1 Patch Transdermal every 24 hours PRN right shoulder pain    Vital Signs Last 24 Hrs  T(C): 36.3 (30 Apr 2018 05:01), Max: 36.6 (29 Apr 2018 16:53)  T(F): 97.4 (30 Apr 2018 05:01), Max: 97.8 (29 Apr 2018 16:53)  HR: 66 (30 Apr 2018 05:40) (66 - 100)  BP: 109/67 (30 Apr 2018 05:01) (109/67 - 134/74)  RR: 18 (30 Apr 2018 05:01) (17 - 24)  SpO2: 92% (30 Apr 2018 05:40) (86% - 99%)    REVIEW OF SYSTEM:  CONSTITUTIONAL: admits generalized weakness, no fevers or chills  EYES/ENT: No visual changes;  No vertigo or throat pain   NECK: admits neck discomfort, denies stiffness  RESPIRATORY: admits to shortness of breath this morning, but denies SOB on examination.  CARDIOVASCULAR: admits to SOB  GASTROINTESTINAL: No abdominal pain. No nausea, vomiting, or hematemesis; No diarrhea or constipation. No melena or hematochezia.  GENITOURINARY: No dysuria, frequency or hematuria  NEUROLOGICAL: No numbness or weakness  MSK: Back pain    PHYSICAL EXAM:  General: in no respiratory distress on high flow oxygen  HEENT: NCAT, PERRLA, EOMI bl, moist mucous membranes   Neck: Supple, nontender, no mass  Neurology: A&Ox4, nonfocal, CN II-XII grossly intact, sensation intact  Respiratory: Decreased breath sounds in bilateral bases, no increase work of breathing, on High flow oxygen  CV: tachycardic, irregular rhythm. No murmurs  Abdominal: Soft, NT, ND +BSx4  Extremities: No C/C/E, + peripheral pulses  MSK: Normal ROM, no joint erythema or warmth, no joint swelling   Skin: warm, dry, normal color, no rash or abnormal lesions    LABS:                                      11.8   21.7  )-----------( 322      ( 30 Apr 2018 06:15 )             37.0     04-30    145  |  101  |  43<H>  ----------------------------<  149<H>  4.5   |  36<H>  |  1.40<H>    Ca    8.7      30 Apr 2018 06:15  Mg     2.1     04-29

## 2018-04-30 NOTE — PROGRESS NOTE ADULT - PROBLEM SELECTOR PLAN 3
No RVR reported overnight on tele.   Holding parameters were decreased for cardizem and metoprolol as patient was not receiving medications because BP was low.  rate controlled with cardizem 60 QID and metoprolol for now  -[id number] score of 5, continue anticoagulation with Eliquis 2.5 BID, she meets criteria for 5 bid, though continue 2.5 for now given her drop in Hb).   Watch Hb

## 2018-04-30 NOTE — PROGRESS NOTE ADULT - ATTENDING COMMENTS
Chart reviewed    Patient seen and examined    Agree with plan as outlined above
Chart reviewed    Patient seen and examined    Agree with plan as outlined above
I saw and examined the patient personally. I reviewed the above findings.  I agree with the above history, physical, and plan which I have reviewed and edited where appropriate.
I saw and examined the patient personally. I reviewed the above findings.  I agree with the above history, physical, and plan which I have reviewed and edited where appropriate.
Patient personally seen and examined.  I agree with the above.
Seen/examined. Agree with above.  Hypoxic this AM. Continue with High flow.  CXR. Pulm follow up. Restart IV lasix
83F w/pmh of COPD not on home 02, HTN, HLD, Afib on Eliquis, CHF, multiple compression fractures and ascending penetrating aortic ulcer presenting with sob for 3-4 days, admitted for CHF exacerbation with underyling copd. rAPID Rapid response called because patient desat. to 60s   pt was placed on bipap/high flow 02  repeat cxr, with markedly elevated probnp suggestive of component of chf  Lasix IV, monitor I/O  suspect component of aspriation as well, started on iv abx zosyn  clinically slightly improved  speech and swallow pureed diet  advance care planning pt is Dnr/DNI,   conitnue supportive care   hospice eval for goal of care as pt has prolong hospital course and has not improved much, will focus on quality  hospice eval      pt also has anxiety  and pysch eval for prolong therapy    Afib   raote controlled   continue on eliquis    copd not on home 02  continue on predisone  spiriva  duoneb   pulm f/u
83F w/pmh of COPD not on home 02, HTN, HLD, Afib on Eliquis, CHF, multiple compression fractures and ascending penetrating aortic ulcer presenting with sob for 3-4 days, admitted for CHF exacerbation with underyling copd      Problem/Plan - 1:  ·  Problem: Acute systolic congestive heart failure.  will change lasix to po, given worsening kindey fucntion   -Patient will need to be evaluated for home O2       ·  Problem: Pneumonia.  Plan: -CT revealed small right greater than left pleural effusions. Scattered patchy right upper lobe pulmonary infiltrates new from the prior study.  -Clinically patient does not appear to have pneumonia Patient is afebrile, nontoxic appearing, no leukocytosis, mildly elevated procalcitonin. Discontinued abx, and PNA ruled out  -Monitor O2 sat  -will monitor clinical symptoms.    DENISE  hold IV lasix  trending creatine
83F w/pmh of COPD not on home 02, HTN, HLD, Afib on Eliquis, CHF, multiple compression fractures and ascending penetrating aortic ulcer presenting with sob for 3-4 days, admitted for CHF exacerbation with underyling copd. rAPID Rapid response called because patient desat. to 60s and was in afib with RVR  pt did not receive cardizem 3 doseages as was borderline hypotensive  after cardizem push, rate is controlled  pt also has anxiety, will start on xanax   and pysch eval for prolong therapy  continue on eliquis    copd not on home 02  continue on predisone  spiriva  duoneb   pulm f/u
I personally conducted a physical examination of the patient. I personally gathered the patient's history. I edited the above listed findings which were prepared by the listed resident physician. I personally discussed the plan of care with the patient. The questions and concerns were addressed to the best of my ability. The patient is in agreement with the listed treatment plan.     A/P: CHF exacerbation w/ b/l pleural effusions, acute hypoxic respiratory failure, will likely need CINDY based on PT eval. continue diuresis. continue eliquis.
83F w/pmh of COPD not on home 02, HTN, HLD, Afib on Eliquis, CHF, multiple compression fractures and ascending penetrating aortic ulcer presenting with sob for 3-4 days, admitted for CHF exacerbation with underyling copd. rAPID Rapid response called because patient desat. to 60s   pt was placed on bipap/high flow 02  repeat cxr, with markedly elevated probnp suggestive of component of chf  Lasix IV, monitor I/O  suspect component of aspriation as well, started on iv abx zosyn  clinically slightly improved  would recommend speech and swallow eal   advance care planning pt is Dnr/DNI,   conitnue supportive care       pt also has anxiety  and pysch eval for prolong therapy    Afib   raote controlled   continue on eliquis    copd not on home 02  continue on predisone  spiriva  duoneb   pulm f/u
83F w/pmh of COPD not on home 02, HTN, HLD, Afib on Eliquis, CHF, multiple compression fractures and ascending penetrating aortic ulcer presenting with sob for 3-4 days, admitted for CHF exacerbation with underyling copd. rAPID Rapid response called because patient desat. to 60s   pt was placed on bipap/high flow 02  repeat cxr, with markedly elevated probn suggesitve of component of chf  will resume lasix iv  monitor I/O       pt also has anxiety, will start on xanax   and pysch eval for prolong therapy  continue on eliquis    copd not on home 02  continue on predisone  spiriva  duoneb   pulm f/u
Had family meeting with Radha Watts and resident and myself. D/W her sister, daughter and JEAN, given all scenario with options including palliative care and Hospice care. Given the critical condition of pt hospice would be appropriate.  Pt is asking for Morphine and wants to be comfortable.
I personally conducted a physical examination of the patient. I personally gathered the patient's history. I edited the above listed findings which were prepared by the listed resident physician. I personally discussed the plan of care with the patient. The questions and concerns were addressed to the best of my ability. The patient is in agreement with the listed treatment plan.     A/P: pt admitted w/ first episode of chf exacerbation, questionable pneumonia tho afebrile and without leukocytosis. will continue w/ IV diuretics, f/u TTE. will continue current tx plan and speak w/ cardiology

## 2018-04-30 NOTE — PROGRESS NOTE ADULT - PROBLEM SELECTOR PLAN 9
-Patient takes Praluent pen 2x monthy  -Will resume upon discharge
Continue with metoprolol  Patient also on Lasix  Will hold Losartan for now because blood pressure is low
-Patient takes Praluent pen 2x monthy  -Will resume upon discharge
Normally takes metoprolol  daily, will decrease to 50 daily with low BP  Patient also on Lasix  Will hold Losartan for now because blood pressure is low

## 2018-04-30 NOTE — PROGRESS NOTE ADULT - PROBLEM SELECTOR PROBLEM 3
Atrial fibrillation, unspecified type
Atrial fibrillation, unspecified type
Hyponatremia
Atrial fibrillation, unspecified type
COPD without exacerbation
COPD without exacerbation
Hyponatremia
COPD without exacerbation
Atrial fibrillation, unspecified type
Atrial fibrillation, unspecified type

## 2018-04-30 NOTE — PROGRESS NOTE ADULT - SUBJECTIVE AND OBJECTIVE BOX
Rapid response was called at on this 83y Female patient for SOB and desaturation to 82-83 percent on nonrebreather. 83F w/pmh of COPD not on home 02, HTN, HLD, Afib on Eliquis, CHF, multiple compression fractures and ascending penetrating aortic ulcer presenting with sob for 3-4 days, admitted for CHF exacerbation with underyling copd. RR called because patient desatting to low 80s. Patient had been on highflow NC desatted to 84, felt nauseous. IV Zofran ordered/given and patient placed on nonrebreather, which she had been on all day. O2 sat initially sarah to 87 percent, but then patient felt increasingly SOB O2 sat fell further and RR called.      PAST MEDICAL & SURGICAL HISTORY:  CHF (congestive heart failure)  Compression fracture: Aug 2015  Carbon monoxide poisonin  Pulmonary emphysema, unspecified emphysema type  Secondary hypertension  Atrial fibrillation, unspecified type  History of arthroscopy of knee  History of appendectomy:   History of right shoulder replacement:         T(F): 97.4 (18 @ 05:01), Max: 97.8 (18 @ 16:53)  HR: 66 (18 @ 05:40) (66 - 100)  BP: 109/67 (18 @ 05:01) (109/67 - 134/74)  RR: 18 (18 @ 05:01) (17 - 24)  SpO2: 92% (18 @ 05:40) (86% - 99%)  Wt(kg): --        CAPILLARY BLOOD GLUCOSE      POCT Blood Glucose.: 196 mg/dL (2018 01:42)      I&O's Summary    2018 07:  -  2018 07:00  --------------------------------------------------------  IN: 0 mL / OUT: 550 mL / NET: -550 mL    2018 07:  -  2018 06:20  --------------------------------------------------------  IN: 0 mL / OUT: 800 mL / NET: -800 mL        Physical Exam:     Constitutional: +Respiratory distressNo fever, chills, fatigue  Neuro: No headache, numbness, weakness  Resp: No cough, wheezing, +shortness of breath  CVS: No chest pain, palpitations, leg swelling  GI: No abdominal pain, nausea, vomiting, diarrhea   : No dysuria, frequency, incontinence  Skin: No itching, burning, rashes, or lesions   Msk: No joint pain or swelling  Psych: No depression, anxiety, mood swings    Meds:  piperacillin/tazobactam IVPB. IV Intermittent    diltiazem    Tablet Oral  furosemide   Injectable IV Push  metoprolol tartrate Oral    methylPREDNISolone sodium succinate Injectable IV Push    ALBUTerol    0.083% Nebulizer  buDESOnide   0.5 milliGRAM(s) Respule Inhalation  tiotropium 2.5 MICROgram(s)/olodaterol 2.5 MICROgram(s) Inhaler Inhalation    acetaminophen   Tablet. Oral PRN      apixaban Oral    pantoprazole   Suspension Oral          lidocaine   Patch Transdermal PRN                              11.9   23.0  )-----------( 374      ( 2018 06:31 )             35.7       04-    142  |  97  |  33<H>  ----------------------------<  188<H>  3.2<L>   |  33<H>  |  1.10    Ca    8.5      2018 06:31  Mg     2.1                 GLOBAL ISSUE/BEST PRACTICE:  Analgesia: yes  Sedation: no  HOB elevation: yes  Stress ulcer prophylaxis: yes  VTE prophylaxis: yes  Glycemic control: no  Nutrition: npo      CODE STATUS: DNR/DNI  GOC discussion: Y  Critical care time spent (mins): 45  (Reviewing data, imaging, discussing with multidisciplinary team, non inclusive of procedures, discussing goals of care with patient/family)

## 2018-05-01 PROCEDURE — 85018 HEMOGLOBIN: CPT

## 2018-05-01 PROCEDURE — 82962 GLUCOSE BLOOD TEST: CPT

## 2018-05-01 PROCEDURE — 93005 ELECTROCARDIOGRAM TRACING: CPT

## 2018-05-01 PROCEDURE — 84145 PROCALCITONIN (PCT): CPT

## 2018-05-01 PROCEDURE — 83605 ASSAY OF LACTIC ACID: CPT

## 2018-05-01 PROCEDURE — 84484 ASSAY OF TROPONIN QUANT: CPT

## 2018-05-01 PROCEDURE — 82746 ASSAY OF FOLIC ACID SERUM: CPT

## 2018-05-01 PROCEDURE — 80053 COMPREHEN METABOLIC PANEL: CPT

## 2018-05-01 PROCEDURE — 99285 EMERGENCY DEPT VISIT HI MDM: CPT | Mod: 25

## 2018-05-01 PROCEDURE — 97530 THERAPEUTIC ACTIVITIES: CPT

## 2018-05-01 PROCEDURE — 82728 ASSAY OF FERRITIN: CPT

## 2018-05-01 PROCEDURE — 71250 CT THORAX DX C-: CPT

## 2018-05-01 PROCEDURE — 96367 TX/PROPH/DG ADDL SEQ IV INF: CPT

## 2018-05-01 PROCEDURE — 83735 ASSAY OF MAGNESIUM: CPT

## 2018-05-01 PROCEDURE — 99231 SBSQ HOSP IP/OBS SF/LOW 25: CPT

## 2018-05-01 PROCEDURE — 94640 AIRWAY INHALATION TREATMENT: CPT

## 2018-05-01 PROCEDURE — 85610 PROTHROMBIN TIME: CPT

## 2018-05-01 PROCEDURE — 80048 BASIC METABOLIC PNL TOTAL CA: CPT

## 2018-05-01 PROCEDURE — 96365 THER/PROPH/DIAG IV INF INIT: CPT

## 2018-05-01 PROCEDURE — 86140 C-REACTIVE PROTEIN: CPT

## 2018-05-01 PROCEDURE — 87633 RESP VIRUS 12-25 TARGETS: CPT

## 2018-05-01 PROCEDURE — 87581 M.PNEUMON DNA AMP PROBE: CPT

## 2018-05-01 PROCEDURE — 94664 DEMO&/EVAL PT USE INHALER: CPT

## 2018-05-01 PROCEDURE — 12345: CPT | Mod: NC

## 2018-05-01 PROCEDURE — 82550 ASSAY OF CK (CPK): CPT

## 2018-05-01 PROCEDURE — 71045 X-RAY EXAM CHEST 1 VIEW: CPT

## 2018-05-01 PROCEDURE — 83880 ASSAY OF NATRIURETIC PEPTIDE: CPT

## 2018-05-01 PROCEDURE — 93306 TTE W/DOPPLER COMPLETE: CPT

## 2018-05-01 PROCEDURE — 84443 ASSAY THYROID STIM HORMONE: CPT

## 2018-05-01 PROCEDURE — 97162 PT EVAL MOD COMPLEX 30 MIN: CPT

## 2018-05-01 PROCEDURE — 87486 CHLMYD PNEUM DNA AMP PROBE: CPT

## 2018-05-01 PROCEDURE — 82553 CREATINE MB FRACTION: CPT

## 2018-05-01 PROCEDURE — 85730 THROMBOPLASTIN TIME PARTIAL: CPT

## 2018-05-01 PROCEDURE — 97116 GAIT TRAINING THERAPY: CPT

## 2018-05-01 PROCEDURE — 82607 VITAMIN B-12: CPT

## 2018-05-01 PROCEDURE — 82803 BLOOD GASES ANY COMBINATION: CPT

## 2018-05-01 PROCEDURE — 94760 N-INVAS EAR/PLS OXIMETRY 1: CPT

## 2018-05-01 PROCEDURE — 83550 IRON BINDING TEST: CPT

## 2018-05-01 PROCEDURE — 97110 THERAPEUTIC EXERCISES: CPT

## 2018-05-01 PROCEDURE — 87040 BLOOD CULTURE FOR BACTERIA: CPT

## 2018-05-01 PROCEDURE — 87798 DETECT AGENT NOS DNA AMP: CPT

## 2018-05-01 PROCEDURE — 85027 COMPLETE CBC AUTOMATED: CPT

## 2018-05-01 PROCEDURE — 99221 1ST HOSP IP/OBS SF/LOW 40: CPT

## 2018-05-01 PROCEDURE — 94660 CPAP INITIATION&MGMT: CPT

## 2018-05-01 PROCEDURE — 83036 HEMOGLOBIN GLYCOSYLATED A1C: CPT

## 2018-05-01 RX ORDER — MORPHINE SULFATE 50 MG/1
4 CAPSULE, EXTENDED RELEASE ORAL
Qty: 0 | Refills: 0 | Status: DISCONTINUED | OUTPATIENT
Start: 2018-05-01 | End: 2018-05-01

## 2018-05-01 RX ADMIN — MORPHINE SULFATE 4 MILLIGRAM(S): 50 CAPSULE, EXTENDED RELEASE ORAL at 01:47

## 2018-05-01 NOTE — CHART NOTE - NSCHARTNOTEFT_GEN_A_CORE
Called by RN because patient in Afib with RVR with heart rate in 130s. Patient also desaturating on monitor O2 sat in low 80s, on high flow NC. According to hospitalist note from 4/30/18, patient, Rdaha Jeffries and Mac (ULISSES) and family discussed palliative care measures today. Patient requesting morphine to be comfortable. Discussed case with family. They explained that they wanted all medical interventions, including IV ABX, cardizem, eliquis, lasix in hopes of prolonging her life, as well as giving her enough time to see her son. New MOLST signed today by patient, sister and Radha Jeffries placing her on comfort measures, specifying no BIPAP. Not yet signed by attending physician. Patient refused BIPAP. In addition new MOLST, patient has medical decision making capacity (AAOX4, understands her condition and consequences of her decisions).   Decision made with family that we will control tachycardia with IV push cardizem 10 mg. Dilaudid 1 mg q10 minutes PRN for dyspnea, distress, pain, palliative measures. Family requested less potent opioid. Patient also has Ativan 1 mg IV push q1 hour for dyspnea, distress, anxiety ordered.    Assessment/Plan: 84 yo female admitted for CHF exacerbation with many RR called for hypoxia and SOB over past week. Now wishes to be comfort care, but would like rate control, abx, other life sustaining medications. New MOLST form placing her on comfort measures only, not yet signed by attending physician, no electronic comfort care orders entered.  Cardizem 10 mg IV push  Will change dilaudid order to morphine 4 mg IV push q2  will continue high flow NC, NO BIPAP at patient's request  discussed plan with family, patient and Dr. Shook, nocturnist Called by RN because patient in Afib with RVR with heart rate in 130s. Patient also desaturating on monitor O2 sat in low 80s, on high flow NC. According to hospitalist note from 4/30/18, patient, Radha Jeffries and Mac (ULISSES) and family discussed palliative care measures today. Patient requesting morphine to be comfortable. Discussed case with family. They explained that they wanted all medical interventions, including IV ABX, cardizem, eliquis, lasix in hopes of prolonging her life, to give her enough time to see her son. New MOLST signed today by patient, sister and Radha Jeffries placing her on comfort measures, specifying no BIPAP. Not yet signed by attending physician. Patient refused BIPAP. In addition to new MOLST, patient has medical decision making capacity (AAOX4, understands her condition and consequences of her decisions).   Decision made with family that we will control tachycardia with IV push cardizem 10 mg. Dilaudid 1 mg q10 minutes PRN for dyspnea, distress, pain, palliative measures was ordered during the day. Family requested less potent opioid. Patient also has Ativan 1 mg IV push q1 hour for dyspnea, distress, anxiety ordered during the day.    Assessment/Plan: 84 yo female admitted for CHF exacerbation with many RRs called for hypoxia and SOB over past week. Now wishes to be comfort care, but would like rate control, abx, other life sustaining medications. New MOLST form placing her on comfort measures only, not yet signed by attending physician, no electronic comfort care orders entered.  Cardizem 10 mg IV push  Will change dilaudid order to morphine 4 mg IV push q2  will continue high flow NC, NO BIPAP at patient's request  discussed plan with family, patient and Dr. Shook, nocturnist  Dr. Shook, signed new MOLST form, making patient comfort care. Cardiac monitor removed.

## 2018-05-01 NOTE — DISCHARGE NOTE FOR THE EXPIRED PATIENT - SECONDARY DIAGNOSIS.
Acute systolic congestive heart failure Atrial fibrillation, unspecified type Pulmonary emphysema, unspecified emphysema type Secondary hypertension

## 2018-05-01 NOTE — DISCHARGE NOTE FOR THE EXPIRED PATIENT - HOSPITAL COURSE
83F w/pmh of COPD not on home 02, HTN, HLD, Afib on Eliquis, CHF, multiple compression fractures and ascending penetrating aortic ulcer presenting with sob for 3-4 days. States that she has been having increasing sob for the past few days. States that she also has nausea and regurgitated clear fluids around 3-4 days ago. Pt has felt increasing weakness and lethargy with decreased appetite as well. She has tried nebulizers and inhalers for her sob without relief of her symptoms. Patient is unable to lie down flat due to compression fractures and difficulty breathing. Denies headache, fever, chills, vomiting, cough, chest pain, palpitations, peripheral edema and abdominal pain. No sick contacts. No recent travel. She ambulates with a cane. Patient has a history of spasmodic dysphonia which she was previously getting botox shots for but had an episode of aspiration so she stopped the injections.    In ED, patient was hypotensive with BP of 88/53, improved to 106/60. Remainder of vitals were within normal range. CBC  unremarkable, BMP significant for hyponatremia. ProBNP was 25,370. RVP negative. CT chest ordered and pending. Preliminary read of EKG revealed afib with ventricular rate of 85. CXR showed small right pleural effusion, questionable infiltrate. CT chest revealed small right greater than left pleural effusions. Scattered patchy right upper lobe pulmonary infiltrates new from the prior study. Emphysema. Stable peripherally calcified lesion in the spleen. Patient given Rocephin and zithromax in ED.   Pt continued to have hypoxia and was on BIPAP and HF O2. followed by pul and cardio. pt did not respond to treatment and was in in resp distress. Pt opted for Hospice care after a meeting with family on 4/30.  She was on PO and IV morphine with supportive O2 . Pt had agonal breathing and eventually had cardiopulmonary arrest. pronounced dead at 3.34 am on 5/1/18. Family was present with her.

## 2018-05-01 NOTE — CHART NOTE - NSCHARTNOTEFT_GEN_A_CORE
Hospitalist Death Note    Called to see patient regarding unresponsiveness.  Patient was DNR/DNI and was already on comfort care but receiving antibiotics, other medications.  Patient had no response to any stimuli including verbal or tactile stimuli.  No spontaneous movements were present.  No papillary or corneal reflexes were seen.  No breath sounds were auscultated and no spontaneous breaths were seen.  No hearth sounds were heard over the entire precordium and no pulses were palpated.  Patient was pronounced at 03:34, 05/01/18.  Family notified by phone.  Attending, Dr. Shook, was notified.

## 2018-06-25 NOTE — PROGRESS NOTE ADULT - ASSESSMENT
MD White & ALBERT Ch 83F w/pmh of COPD not on home 02, HTN, HLD, Afib on Eliquis, CHF, multiple compression fractures and ascending penetrating aortic ulcer presenting with sob.    Likely multifactorial 2/2 CHF exacerbation vs. COPD vs. aspiration pneumonia.  No clear evidence of acute ischemia,    - Pt slept well on bipap last night received Remeron last night and uses HiFlow @ at 50% FIO2 during the day.  No events overnight.    - Follow Pulm recs.  Continue bronchodilators and steroids  - She seems to need diuresis. Her exam does not indicate it but her increased need for supplemental O2, elevated BNP, CXR with no improvement in her vascular congestion and the responsiveness of her sodium level after restarting lasix probably indicates volume overload.   - Cont Lasix 40 IV bid.  Watch her creatinine closely as she DENISE on CKD stage 3.    - No acute ischemia  - Continue Lopressor 25mg BID and Cardizem 60mg 4x daily. BP soft per flowsheet but has been receiving her doses with rate controlled AF on tele.      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  Repeat tte from yesterday is similar   - Pt has hx of chronic afib on Eliquis.  CHADsVASc score 5.  H& H remains stable Continue Eliquis 2.5 mg BID ( pt. has normal renal function, she met criteria for 5 bid, however. Continue to monitor CBC)  - Symptomatology is mostly from underlying pulmonary disease.  CT chest with small bilateral effusions with RUL infiltrates  - Monitor and replete lytes, keep K>4, Mg>2  - Cardiovascular workup will depend on clinical course, all other workup per primary team  - Will continue to follow-up   - Patient is a DNR    Madiha Da Silva, NP-C  Cardiology

## 2020-12-15 PROBLEM — Z87.09 HISTORY OF ACUTE SINUSITIS: Status: RESOLVED | Noted: 2017-03-01 | Resolved: 2020-12-15

## 2020-12-15 NOTE — BEHAVIORAL HEALTH ASSESSMENT NOTE - HPI (INCLUDE ILLNESS QUALITY, SEVERITY, DURATION, TIMING, CONTEXT, MODIFYING FACTORS, ASSOCIATED SIGNS AND SYMPTOMS)
83F w/pmh of COPD not on home 02, HTN, HLD, Afib on Eliquis, CHF, multiple compression fractures and ascending penetrating aortic ulcer presenting with sob for 3-4 days. States that she has been having increasing sob for the past few days. States that she also has nausea and regurgitated clear fluids around 3-4 days ago. Pt has felt increasing weakness and lethargy with decreased appetite as well. She has tried nebulizers and inhalers for her sob without relief of her symptoms. Patient is unable to lie down flat due to compression fractures and difficulty breathing. Denies headache, fever, chills, vomiting, cough, chest pain, palpitations, peripheral edema and abdominal pain. No sick contacts. No recent travel. She ambulates with a cane. Patient has a history of spasmodic dysphonia which she was previously getting botox shots for but had an episode of aspiration so she stopped the injections.  Patient and patient's sister report increase in anxiety, difficulty sleeping in context of shortness of breath. Patient at this time is somewhat lethargic as she was given Xanax prn. 3

## 2020-12-16 PROBLEM — J06.9 ACUTE URI: Status: RESOLVED | Noted: 2017-02-22 | Resolved: 2020-12-16

## 2021-12-23 NOTE — ED PROVIDER NOTE - CONDUCTED A DETAILED DISCUSSION WITH PATIENT AND/OR GUARDIAN REGARDING, MDM
need for outpatient follow-up/lab results/return to ED if symptoms worsen, persist or questions arise/radiology results Chronic   - Pt has intolerance to therapeutic interchange of home rosuvastatin. Will hold statin for now  - lipid panel 12/22/21 cholesterol 220, non HDL cholesterol 144, , TG and HDL WNL

## 2021-12-23 NOTE — ED PROVIDER NOTE - PRINCIPAL DIAGNOSIS
TD today   Urinalysis today or soon refer to follow-up on hematuria  Schedule CT renal protocol for history of kidney stones no contrast  Please drop off blood work done at your work at your convenience for me to review  Low-salt diet.  Low-fat diet.  Stay active.  Exercise regularly and safely 4 to 5 times a week or 150 minutes a week  Recommend regular dental exam.  Recommend regular eye exam.  Please protect your skin from excessive sun exposure, use sunscreen, wear protective clothing.  Call if any questions or concerns at any time.  See me in 1 year for physical earlier if needed  
Cough

## 2022-07-21 NOTE — ED PROVIDER NOTE - DISPOSITION TYPE
DISCHARGE Erythromycin Counseling:  I discussed with the patient the risks of erythromycin including but not limited to GI upset, allergic reaction, drug rash, diarrhea, increase in liver enzymes, and yeast infections.

## 2023-09-08 NOTE — DIETITIAN INITIAL EVALUATION ADULT. - ETIOLOGY
"Chief Complaint  Cough    Subjective        Brigitte Jauregui presents to [facility]  History of Present Illness    Pt is here today for congestion, sinus pressure. She feels she has gotten worse over the last 3 days. She reports chills, fatigue. She is drinking a lot of Gatorade, soda. She has taken 3 days of amoxicillin. She says she doesn't eat much. She doesn't have an appetite.     Objective   Vital Signs:  /65 (BP Location: Left arm, Patient Position: Sitting, Cuff Size: Adult)   Pulse 74   Temp 98.6 °F (37 °C) (Temporal)   Ht 157.5 cm (62\")   Wt 45.8 kg (101 lb)   SpO2 97%   BMI 18.47 kg/m²   Estimated body mass index is 18.47 kg/m² as calculated from the following:    Height as of this encounter: 157.5 cm (62\").    Weight as of this encounter: 45.8 kg (101 lb).                Physical Exam  Vitals reviewed.   Constitutional:       General: She is not in acute distress.     Appearance: Normal appearance. She is not ill-appearing, toxic-appearing or diaphoretic.   Cardiovascular:      Rate and Rhythm: Normal rate and regular rhythm.      Pulses: Normal pulses.      Heart sounds: Normal heart sounds.   Pulmonary:      Effort: Pulmonary effort is normal. No respiratory distress.      Breath sounds: Rhonchi present.   Skin:     General: Skin is warm and dry.      Capillary Refill: Capillary refill takes less than 2 seconds.   Neurological:      General: No focal deficit present.      Mental Status: She is alert and oriented to person, place, and time.   Psychiatric:         Mood and Affect: Mood normal.         Behavior: Behavior normal.         Thought Content: Thought content normal.         Judgment: Judgment normal.      Result Review :                Assessment and Plan   Diagnoses and all orders for this visit:    1. Upper respiratory tract infection, unspecified type (Primary)  Comments:  -switch to cefdinir  -OTC sudafed, flonase  Orders:  -     cefdinir " (OMNICEF) 300 MG capsule; Take 1 capsule by mouth 2 (Two) Times a Day for 7 days.  Dispense: 14 capsule; Refill: 0             Follow Up   Return if symptoms worsen or fail to improve.  Patient was given instructions and counseling regarding her condition or for health maintenance advice. Please see specific information pulled into the AVS if appropriate.        related to inadequate oral intake with COPD on home O2

## 2024-11-19 NOTE — ED ADULT NURSE NOTE - BREATH SOUNDS, LEFT
Medicare Wellness Visit  Plan for Preventive Care    A good way for you to stay healthy is to use preventive care.  Medicare covers many services that can help you stay healthy.* The goal of these services is to find any health problems as quickly as possible. Finding problems early can help make them easier to treat.  Your personal plan below lists the services you may need and when they are due.      Health Maintenance Summary     DTaP/Tdap/Td Vaccine (2 - Td or Tdap)  Overdue since 1/6/2019    Shingles Vaccine (2 of 2)  Overdue since 1/2/2021    Influenza Vaccine (1)  Overdue since 9/1/2024    COVID-19 Vaccine (6 - 2024-25 season)  Overdue since 9/1/2024    Lung Cancer Screening (Yearly)  Due soon on 1/11/2025    Colorectal Cancer Screen (Colonoscopy - Every 10 Years)  Next due on 4/24/2025    Depression Screening (Yearly)  Next due on 11/17/2025    Breast Cancer Screening (Every 2 Years)  Next due on 1/11/2026    Hepatitis C Screening   Completed    Pneumococcal Vaccine 65+   Completed    Osteoporosis Screening   Completed    Medicare Advantage- Medicare Wellness Visit   Completed    Meningococcal Vaccine   Aged Out    Hepatitis B Vaccine (For Physician/[facility] Discussion)   Aged Out    HPV Vaccine   Aged Out           Preventive Care for Women and Men    Heart Screenings (Cardiovascular):  Blood tests are used to check your cholesterol, lipid and triglyceride levels. High levels can increase your risk for heart disease and stroke. High levels can be treated with medications, diet and exercise. Lowering your levels can help keep your heart and blood vessels healthy.  Your provider will order these tests if they are needed.    An ultrasound is done to see if you have an abdominal aortic aneurysm (AAA).  This is an enlargement of one of the main blood vessels that delivers blood to the body.   In the [place], 9,000 deaths are caused by AAA.  You may not even know you have this problem and as many as 1 in 3  people will have a serious problem if it is not treated.  Early diagnosis allows for more effective treatment and cure.  If you have a family history of AAA or are a male age 65-75 who has smoked, you are at higher risk of an AAA.  Your provider can order this test, if needed.    Colorectal Screening:  There are many tests that are used to check for cancer of your colon and rectum. You and your provider should discuss what test is best for you and when to have it done.  Options include:  Screening Colonoscopy: exam of the entire colon, seen through a flexible lighted tube.  Flexible Sigmoidoscopy: exam of the last third (sigmoid portion) of the colon and rectum, seen through a flexible lighted tube.  Cologuard DNA stool test: a sample of your stool is used to screen for cancer and unseen blood in your stool.  Fecal Occult Blood Test: a sample of your stool is studied to find any unseen blood    Flu Shot:  An immunization that helps to prevent influenza (the flu). You should get this every year. The best time to get the shot is in the fall.    Pneumococcal Shot:  Vaccines help prevent pneumococcal disease, which is any type of illness caused by Streptococcus pneumoniae bacteria. There are two kinds of pneumococcal vaccines available in the [place]:   Pneumococcal conjugate vaccines (PCV20 or [id number]®)  Pneumococcal polysaccharide vaccine (PPSV23 or [id number]®)  For those who have never received any pneumococcal conjugate vaccine, CDC recommends PVC20 for adults 65 years or older and adults 19 through 64 years old with certain medical conditions or risk factors.   For those who have previously received PCV13, this should be followed by a dose of PPSV23.     Hepatitis B Shot:  An immunization that helps to protect people from getting Hepatitis B. Hepatitis B is a virus that spreads through contact with infected blood or body fluids. Many people with the virus do not have symptoms.  The virus can lead to  serious problems, such as liver disease. Some people are at higher risk than others. Your doctor will tell you if you need this shot.     Diabetes Screening:  A test to measure sugar (glucose) in your blood is called a fasting blood sugar. Fasting means you cannot have food or drink for at least 8 hours before the test. This test can detect diabetes long before you may notice symptoms.    Glaucoma Screening:  Glaucoma screening is performed by your eye doctor. The test measures the fluid pressure inside your eyes to determine if you have glaucoma.     Hepatitis C Screening:  A blood test to see if you have the hepatitis C virus.  Hepatitis C attacks the liver and is a major cause of chronic liver disease.  Medicare will cover a single screening for all adults born between 1945 & 1965, or high risk patients (people who have injected illegal drugs or people who have had blood transfusions).  High risk patients who continue to inject illegal drugs can be screened for Hepatitis C every year.    Smoking and Tobacco-Use Cessation Counseling:  Tobacco is the single greatest cause of disease and early death in our country today. Medication and counseling together can increase a person’s chance of quitting for good.   Medicare covers two quitting attempts per year, with four counseling sessions per attempt (eight sessions in a 12 month period)    Preventive Screening tests for Women    Screening Mammograms and Breast Exams:  An x-ray of your breasts to check for breast cancer before you or your doctor may be able to feel it.  If breast cancer is found early it can usually be treated with success.    Pelvic Exams and Pap Tests:  An exam to check for cervical and vaginal cancer. A Pap test is a lab test in which cells are taken from your cervix and sent to the lab to look for signs of cervical cancer. If cancer of the cervix is found early, chances for a cure are good. Testing can generally end at age 65, or if a woman has a  hysterectomy for a benign condition. Your provider may recommend more frequent testing if certain abnormal results are found.    Bone Mass Measurements:  A painless x-ray of your bone density to see if you are at risk for a broken bone. Bone density refers to the thickness of bones or how tightly the bone tissue is packed.    Preventive Screening tests for Men    Prostate Screening:  Should you have a prostate cancer test (PSA)?  It is up to you to decide if you want a prostate cancer test. Talk to your clinician to find out if the test is right for you.  Things for you to consider and talk about should include:  Benefits and harms of the test  Your family history  How your race/ethnicity may influence the test  If the test may impact other medical conditions you have  Your values on screenings and treatments    *Medicare pays for many preventive services to keep you healthy. For some of these services, you might have to pay a deductible, coinsurance, and / or copayment.  The amounts vary depending on the type of services you need and the kind of Medicare health plan you have.    For further details on screenings offered by Medicare please visit: https://www.medicare.gov/coverage/preventive-screening-services     Education Materials    Handouts provided during this visit.  ________________________________________  [facility] instructions provided during this visit.  ________________________________________  Websites discussed during this visit.  ________________________________________  Additional Materials        diminished
